# Patient Record
Sex: MALE | Race: WHITE | NOT HISPANIC OR LATINO | Employment: FULL TIME | URBAN - METROPOLITAN AREA
[De-identification: names, ages, dates, MRNs, and addresses within clinical notes are randomized per-mention and may not be internally consistent; named-entity substitution may affect disease eponyms.]

---

## 2019-10-12 ENCOUNTER — OFFICE VISIT (OUTPATIENT)
Dept: URGENT CARE | Facility: CLINIC | Age: 54
End: 2019-10-12
Payer: OTHER MISCELLANEOUS

## 2019-10-12 DIAGNOSIS — M54.32 SCIATICA OF LEFT SIDE: ICD-10-CM

## 2019-10-12 DIAGNOSIS — S39.012A LUMBAR STRAIN, INITIAL ENCOUNTER: Primary | ICD-10-CM

## 2019-10-12 PROCEDURE — 96372 THER/PROPH/DIAG INJ SC/IM: CPT | Mod: S$GLB,,, | Performed by: NURSE PRACTITIONER

## 2019-10-12 PROCEDURE — 99204 OFFICE O/P NEW MOD 45 MIN: CPT | Mod: 25,S$GLB,, | Performed by: NURSE PRACTITIONER

## 2019-10-12 PROCEDURE — 96372 PR INJECTION,THERAP/PROPH/DIAG2ST, IM OR SUBCUT: ICD-10-PCS | Mod: S$GLB,,, | Performed by: NURSE PRACTITIONER

## 2019-10-12 PROCEDURE — 99204 PR OFFICE/OUTPT VISIT, NEW, LEVL IV, 45-59 MIN: ICD-10-PCS | Mod: 25,S$GLB,, | Performed by: NURSE PRACTITIONER

## 2019-10-12 RX ORDER — METHOCARBAMOL 500 MG/1
500 TABLET, FILM COATED ORAL 3 TIMES DAILY
Qty: 30 TABLET | Refills: 0 | Status: SHIPPED | OUTPATIENT
Start: 2019-10-12 | End: 2019-10-22

## 2019-10-12 RX ORDER — DICLOFENAC SODIUM 50 MG/1
50 TABLET, DELAYED RELEASE ORAL 3 TIMES DAILY PRN
Qty: 30 TABLET | Refills: 0 | Status: SHIPPED | OUTPATIENT
Start: 2019-10-12 | End: 2019-10-12 | Stop reason: SDUPTHER

## 2019-10-12 RX ORDER — DEXAMETHASONE SODIUM PHOSPHATE 4 MG/ML
8 INJECTION, SOLUTION INTRA-ARTICULAR; INTRALESIONAL; INTRAMUSCULAR; INTRAVENOUS; SOFT TISSUE
Status: COMPLETED | OUTPATIENT
Start: 2019-10-12 | End: 2019-10-12

## 2019-10-12 RX ORDER — PREDNISONE 20 MG/1
20 TABLET ORAL 2 TIMES DAILY
Qty: 10 TABLET | Refills: 0 | Status: SHIPPED | OUTPATIENT
Start: 2019-10-12 | End: 2019-10-12 | Stop reason: SDUPTHER

## 2019-10-12 RX ORDER — KETOROLAC TROMETHAMINE 30 MG/ML
30 INJECTION, SOLUTION INTRAMUSCULAR; INTRAVENOUS
Status: COMPLETED | OUTPATIENT
Start: 2019-10-12 | End: 2019-10-12

## 2019-10-12 RX ORDER — DICLOFENAC SODIUM 50 MG/1
50 TABLET, DELAYED RELEASE ORAL 3 TIMES DAILY PRN
Qty: 30 TABLET | Refills: 0 | Status: SHIPPED | OUTPATIENT
Start: 2019-10-12 | End: 2020-05-06

## 2019-10-12 RX ORDER — PREDNISONE 20 MG/1
20 TABLET ORAL 2 TIMES DAILY
Qty: 10 TABLET | Refills: 0 | Status: SHIPPED | OUTPATIENT
Start: 2019-10-12 | End: 2019-10-17

## 2019-10-12 RX ORDER — METHOCARBAMOL 500 MG/1
500 TABLET, FILM COATED ORAL 3 TIMES DAILY
Qty: 30 TABLET | Refills: 0 | Status: SHIPPED | OUTPATIENT
Start: 2019-10-12 | End: 2019-10-12 | Stop reason: SDUPTHER

## 2019-10-12 RX ADMIN — DEXAMETHASONE SODIUM PHOSPHATE 8 MG: 4 INJECTION, SOLUTION INTRA-ARTICULAR; INTRALESIONAL; INTRAMUSCULAR; INTRAVENOUS; SOFT TISSUE at 04:10

## 2019-10-12 RX ADMIN — KETOROLAC TROMETHAMINE 30 MG: 30 INJECTION, SOLUTION INTRAMUSCULAR; INTRAVENOUS at 04:10

## 2019-10-12 NOTE — PATIENT INSTRUCTIONS
Back Sprain or Strain    Injury to the muscles (strain) or ligaments (sprain) around the spine can be troubling. Injury may occur after a sudden forceful twisting or bending force such as in a car accident, after a simple awkward movement, or after lifting something heavy with poor body positioning. In any case, muscle spasm is often present and adds to the pain.  Thankfully, most people feel better in 1 to 2 weeks, and most of the rest in 1 to 2 months. Most people can remain active. Unless you had a forceful or traumatic physical injury such as a car accident or fall, X-rays may not be ordered for the first evaluation of a back sprain or strain. If pain continues and does not respond to medical treatment, your healthcare provider may then order X-rays and other tests.  Home care  The following guidelines will help you care for your injury at home:  · When in bed, try to find a comfortable position. A firm mattress is best. Try lying flat on your back with pillows under your knees. You can also try lying on your side with your knees bent up toward your chest and a pillow between your knees.  · Don't sit for long periods. Try not to take long car rides or take other trips that have you sitting for a long time. This puts more stress on the lower back than standing or walking.  · During the first 24 to 72 hours after an injury or flare-up, apply an ice pack to the painful area for 20 minutes. Then remove it for 20 minutes. Do this for 60 to 90 minutes, or several times a day. This will reduce swelling and pain. Be sure to wrap the ice pack in a thin towel or plastic to protect your skin.  · You can start with ice, then switch to heat. Heat from a hot shower, hot bath, or heating pad reduces pain and works well for muscle spasms. Put heat on the painful area for 20 minutes, then remove for 20 minutes. Do this for 60 to 90 minutes, or several times a day. Do not use a heating pad while sleeping. It can burn the  skin.  · You can alternate the ice and heat. Talk with your healthcare provider to find out the best treatment or therapy for your back pain.  · Therapeutic massage will help relax the back muscles without stretching them.  · Be aware of safe lifting methods. Do not lift anything over 15 pounds until all of the pain is gone.  Medicines  Talk to your healthcare provider before using medicines, especially if you have other health problems or are taking other medicines.  · You may use acetaminophen or ibuprofen to control pain, unless another pain medicine was prescribed. If you have chronic conditions like diabetes, liver or kidney disease, stomach ulcers, or gastrointestinal bleeding, or are taking blood-thinner medicines, talk with your doctor before taking any medicines.  · Be careful if you are given prescription medicines, narcotics, or medicine for muscle spasm. They can cause drowsiness, and affect your coordination, reflexes, and judgment. Do not drive or operate heavy machinery when taking these types of medicines. Only take pain medicine as prescribed by your healthcare provider.  Follow-up care  Follow up with your healthcare provider, or as advised. You may need physical therapy or more tests if your symptoms get worse.  If you had X-rays your healthcare provider may be checking for any broken bones, breaks, or fractures. Bruises and sprains can sometimes hurt as much as a fracture. These injuries can take time to heal completely. If your symptoms dont improve or they get worse, talk with your healthcare provider. You may need a repeat X-ray or other tests.  Call 911  Call for emergency care if any of the following occur:  · Trouble breathing  · Confused  · Very drowsy or trouble awakening  · Fainting or loss of consciousness  · Rapid or very slow heart rate  · Loss of bowel or bladder control  When to seek medical advice  Call your healthcare provider right away if any of the following occur:  · Pain  gets worse or spreads to your arms or legs  · Weakness or numbness in one or both arms or legs  · Numbness in the groin or genital area  Date Last Reviewed: 6/1/2016 © 2000-2017 [employer]. [address] [place] [address], [place] [address]. All rights reserved. This information is not intended as a substitute for professional medical care. Always follow your healthcare professional's instructions.        Sciatica    Sciatica is a condition that causes pain in the lower back that spreads down into the buttock, hip, and leg. Sometimes the leg pain can happen without any back pain. Sciatica happens when a spinal nerve is irritated or has pressure put on it as comes out of the spinal canal in the lower back. This most often happens when a bulge or rupture of a nearby spinal disk presses on the nerve. Sciatica can also be caused by a narrowing of the spinal canal (spinal stenosis) or spasm of the muscle in the buttocks that the sciatic nerve passes through (pyriform muscle). Sciatica is also called lumbar radiculopathy.  Sciatica may begin after a sudden twisting or bending force, such as in a car accident. Or it can happen after a simple awkward movement. In either case, muscle spasm often also happens. Muscle spasm makes the pain worse.  A healthcare provider makes a diagnosis of sciatica from your symptoms and a physical exam. Unless you had an injury from a car accident or fall, you usually wont have X-rays taken at this time. This is because the nerves and disks in your back cant be seen on an X-ray. If the provider sees signs of a compressed nerve, you will need to schedule an MRI scan as an outpatient. Signs of a compressed nerve include loss of strength in a leg.  Most sciatica gets better with medicine, exercise, and physical therapy. If your symptoms continue after at least 3 months of medical treatment, you may need surgery or injections to your lower back.  Home care  Follow these tips when caring  for yourself at home:  · You may need to stay in bed the first few days. But as soon as possible, begin sitting up or walking. This will help you avoid problems that come from staying in bed for long periods.  · When in bed, try to find a position that is comfortable. A firm mattress is best. Try lying flat on your back with pillows under your knees. You can also try lying on your side with your knees bent up toward your chest and a pillow between your knees.  · Avoid sitting for long periods. This puts more stress on your lower back than standing or walking.  · Use heat from a hot shower, hot bath, or heating pad to help ease pain. Massage can also help. You can also try using an ice pack. You can make your own ice pack by putting ice cubes in a plastic bag. Wrap the bag in a thin towel. Try both heat and cold to see which works best. Use the method that feels best for 20 minutes several times a day.  · You may use acetaminophen or ibuprofen to ease pain, unless another pain medicine was prescribed. Note: If you have chronic liver or kidney disease, talk with your healthcare provider before taking these medicines. Also talk with your provider if youve had a stomach ulcer or gastrointestinal bleeding.  · Use safe lifting methods. Dont lift anything heavier than 15 pounds until all of the pain is gone.  Follow-up care  Follow up with your healthcare provider, or as advised. You may need physical therapy or additional tests.  If X-rays were taken, a radiologist will look at them. You will be told of any new findings that may affect your care.  When to seek medical advice  Call your healthcare provider right away if any of these occur:  · Pain gets worse even after taking prescribed medicine  · Weakness or numbness in 1 or both legs or hips  · Numbness in your groin or genital area  · You cant control your bowel or bladder  · Fever  · Redness or swelling over your back or spine   Date Last Reviewed: 8/1/2016  ©  1318-9412 [employer]. [address] [place] [address], [place] [address]. All rights reserved. This information is not intended as a substitute for professional medical care. Always follow your healthcare professional's instructions.        Understanding Lumbar Radiculopathy    Lumbar radiculopathy is irritation or inflammation of a nerve root in the low back. It causes symptoms that spread out from the back down one or both legs. To understand this condition, it helps to understand the parts of the spine:  · Vertebrae. These are bones that stack to form the spine. The lumbar spine contains the 5 bottom vertebrae.  · Disks. These are soft pads of tissue between the vertebrae. They act as shock absorbers for the spine.  · Spinal canal. This is a tunnel formed within the stacked vertebrae. In the lumbar spine, nerves run through this canal.  · Nerves. These branch off and leave the spinal canal, traveling out to parts of the body. As they leave the spinal canal, nerves pass through openings between the vertebrae. The nerve root is the part of the nerve that is closest to the spinal canal.  · Sciatic nerve. This is a large nerve formed from several nerve roots in the low back. This nerve extends down the back of the leg to the foot.  With lumbar radiculopathy, nerve roots in the low back become irritated. This leads to pain and symptoms. The sciatic nerve is commonly involved, so the condition is often called sciatica.  What causes lumbar radiculopathy?  Aging, injury, poor posture, extra body weight, and other issues can lead to problems in the low back. These problems may then irritate nerve roots. They include:  · Damage to a disk in the lumbar spine. The damaged disk may then press on nearby nerve roots.  · Degeneration from wear and tear, and aging. This can lead to narrowing (stenosis) of the openings between the vertebrae. The narrowed openings press on nerve roots as they leave the spinal  canal.  · Unstable spine. This is when a vertebra slips forward. It can then press on a nerve root.  Other, less common things can put pressure on nerves in the low back. These include diabetes, infection, or a tumor.  Symptoms of lumbar radiculopathy  These include:  · Pain in the low back  · Pain, numbness, tingling, or weakness that travels into the buttocks, hip, groin, or leg  · Muscle spasms  Treatment for lumbar radiculopathy  In most cases, your healthcare provider will first try treatments that help relieve symptoms. These may include:  · Prescription and over-the-counter pain medicines. These help relieve pain, swelling, and irritation.  · Limits on positions and activities that increase pain. But lying in bed or avoiding all movement is only recommended for a short period of time.  · Physical therapy, including exercises and stretches. This helps decrease pain and increase movement and function.  · Steroid shots into the lower back. This may help relieve symptoms for a time.  · Weight-loss program. If you are overweight, losing extra pounds may help relieve symptoms.  In some cases, you may need surgery to fix the underlying problem. This depends on the cause, the symptoms, and how long the pain has lasted.  Possible complications  Over time, an irritated and inflamed nerve may become damaged. This may lead to long-lasting (permanent) numbness or weakness in your legs and feet. If symptoms change suddenly or get worse, be sure to let your healthcare provider know.  When to call your healthcare provider  Call your healthcare provider right away if you have any of these:  · New pain or pain that gets worse  · New or increasing weakness, tingling, or numbness in your leg or foot  · Problems controlling your bladder or bowel   Date Last Reviewed: 3/10/2016  © 7223-5153 [email or web address] [address] [place] [address], [place] [address]. All rights reserved. This information is not intended as a substitute  for professional medical care. Always follow your healthcare professional's instructions.        Lumbar Extension (Flexibility)    1. Lie face down on your stomach, forehead on the floor. You can lie on a mat or towel.  2. Bend your arms next to your body and lift your upper body up on your forearms. Your palms and forearms should be flat on the floor. Keep your stomach and hips on the floor.  3. Hold your upper body up with your forearms for 20 seconds. Slowly lower back down to the floor.  4. Repeat 2 times, or as instructed.  Date Last Reviewed: 3/10/2016  © 1249-0854 [employer]. [address] [place] [address]. All rights reserved. This information is not intended as a substitute for professional medical care. Always follow your healthcare professional's instructions.        Lumbar Flexion (Flexibility)    5. Lie on your back on the floor, with your knees bent and your feet flat on the floor.  6. Gently pull your knees up toward your chest. Put your hands under your thighs to help pull your knees up.  7. Press your lower back down to the floor. Hold for 20 seconds.  8. Lower your legs back down to the floor and relax.  9. Repeat 2 times, or as instructed.  Date Last Reviewed: 3/10/2016  © 6188-9136 [employer]. [address] [place] [address]. All rights reserved. This information is not intended as a substitute for professional medical care. Always follow your healthcare professional's instructions.        Lumbar Rotation    10. Lie on your back on the floor, with your knees bent and your feet flat on the floor. Dont press your neck or lower back to the floor.  11. Lean both of your knees to one side. Turn your head in the opposite direction. Keep your shoulders flat on the floor. Be gentle and dont push through pain.  12. Hold for 20 seconds. Then slowly move your knees and head in the other direction.  13. Repeat 2 to 5 times, or as instructed.   Date Last  Reviewed: 3/10/2016  © 8221-6908 [employer]. [address] [place] [address]. All rights reserved. This information is not intended as a substitute for professional medical care. Always follow your healthcare professional's instructions.        Lumbar Stretch (Flexibility)    14. Lie on your back on the floor, with your knees bent and your feet flat on the floor. Dont press your neck or lower back to the floor.  15. Pull one knee up toward your chest. Clasp your hands under your thigh to help pull.  16. Hold for 30 to 60 seconds. Lower your leg back down to the floor.  17. Repeat 2 times, or as instructed.  18. Switch legs and repeat.  Date Last Reviewed: 3/10/2016  © 3348-1580 [employer]. [address] [place] [id number]. All rights reserved. This information is not intended as a substitute for professional medical care. Always follow your healthcare professional's instructions.

## 2019-10-12 NOTE — PROGRESS NOTES
Subjective:       Patient ID: Tato Weaver is a 54 y.o. male.    Vitals:  vitals were not taken for this visit.     Chief Complaint: Work Related Injury    W/C DOI: 10/9/2019 pt was moving pallet and hurt lower back. reports that pain is to lower back and radiates down the left leg     Back Pain   This is a new problem. The current episode started in the past 7 days (10/09/2019). The problem occurs constantly. The pain is present in the lumbar spine. The quality of the pain is described as burning and stabbing. The pain radiates to the left foot. The pain is at a severity of 10/10. The pain is moderate. The pain is the same all the time. The symptoms are aggravated by lying down. Pertinent negatives include no chest pain, dysuria, fever or headaches. (Lower back pain that radiates down the left leg that causes numbness and tingling ) He has tried NSAIDs (tylenol and ibuprofen. tylenol last dosage was at 3 pm ) for the symptoms. The treatment provided mild relief.       Constitution: Negative for chills, fatigue and fever.   HENT: Negative for congestion and sore throat.    Neck: Negative for painful lymph nodes.   Cardiovascular: Negative for chest pain and leg swelling.   Eyes: Negative for double vision and blurred vision.   Respiratory: Negative for cough and shortness of breath.    Gastrointestinal: Negative for nausea, vomiting and diarrhea.   Genitourinary: Negative for dysuria, frequency and urgency.   Musculoskeletal: Positive for pain, trauma (moving a heavy pallet), back pain and muscle ache. Negative for joint pain, joint swelling and muscle cramps.   Skin: Negative for color change, pale and rash.   Allergic/Immunologic: Negative for seasonal allergies.   Neurological: Negative for dizziness, history of vertigo, light-headedness, passing out and headaches.   Hematologic/Lymphatic: Negative for swollen lymph nodes, easy bruising/bleeding and history of blood clots. Does not bruise/bleed easily.    Psychiatric/Behavioral: Negative for nervous/anxious, sleep disturbance and depression. The patient is not nervous/anxious.        Objective:      Physical Exam   Constitutional: He is oriented to person, place, and time. Vital signs are normal. He appears well-developed and well-nourished. He is active and cooperative. No distress.   HENT:   Head: Normocephalic and atraumatic.   Right Ear: External ear normal.   Left Ear: External ear normal.   Nose: Nose normal.   Mouth/Throat: Oropharynx is clear and moist and mucous membranes are normal.   Eyes: Conjunctivae and lids are normal.   Neck: Trachea normal, normal range of motion, full passive range of motion without pain and phonation normal. Neck supple.   Cardiovascular: Normal rate, regular rhythm, normal heart sounds, intact distal pulses and normal pulses.   Pulmonary/Chest: Effort normal and breath sounds normal. No respiratory distress.   Abdominal: Soft. Normal appearance and bowel sounds are normal. He exhibits no distension, no abdominal bruit, no pulsatile midline mass and no mass. There is no tenderness.   Musculoskeletal: He exhibits no edema or deformity.        Lumbar back: He exhibits decreased range of motion, tenderness, pain and spasm. He exhibits no bony tenderness, no swelling, no edema, no deformity, no laceration and normal pulse.        Back:         Left upper leg: He exhibits tenderness. He exhibits no bony tenderness, no swelling, no edema, no deformity and no laceration.        Legs:  Neurological: He is alert and oriented to person, place, and time. He has normal strength and normal reflexes. No sensory deficit.   Skin: Skin is warm, dry, intact, not diaphoretic and not pale. not left upper leg  Psychiatric: He has a normal mood and affect. His speech is normal and behavior is normal. Judgment and thought content normal. Cognition and memory are normal.   Nursing note and vitals reviewed.        Assessment:       1. Lumbar strain,  initial encounter    2. Sciatica of left side        Plan:         Lumbar strain, initial encounter    Sciatica of left side    Other orders  -     dexamethasone injection 8 mg  -     ketorolac injection 30 mg  -     diclofenac (VOLTAREN) 50 MG EC tablet; Take 1 tablet (50 mg total) by mouth 3 (three) times daily as needed.  Dispense: 30 tablet; Refill: 0  -     predniSONE (DELTASONE) 20 MG tablet; Take 1 tablet (20 mg total) by mouth 2 (two) times daily. for 5 days  Dispense: 10 tablet; Refill: 0  -     methocarbamol (ROBAXIN) 500 MG Tab; Take 1 tablet (500 mg total) by mouth 3 (three) times daily. for 10 days  Dispense: 30 tablet; Refill: 0

## 2019-10-15 ENCOUNTER — OFFICE VISIT (OUTPATIENT)
Dept: URGENT CARE | Facility: CLINIC | Age: 54
End: 2019-10-15
Payer: OTHER MISCELLANEOUS

## 2019-10-15 VITALS
DIASTOLIC BLOOD PRESSURE: 91 MMHG | TEMPERATURE: 98 F | RESPIRATION RATE: 20 BRPM | SYSTOLIC BLOOD PRESSURE: 125 MMHG | WEIGHT: 230 LBS | BODY MASS INDEX: 32.2 KG/M2 | HEART RATE: 63 BPM | HEIGHT: 71 IN | OXYGEN SATURATION: 98 %

## 2019-10-15 DIAGNOSIS — M54.9 BACK PAIN, UNSPECIFIED BACK LOCATION, UNSPECIFIED BACK PAIN LATERALITY, UNSPECIFIED CHRONICITY: ICD-10-CM

## 2019-10-15 DIAGNOSIS — M79.605 PAIN OF LEFT LOWER EXTREMITY: ICD-10-CM

## 2019-10-15 DIAGNOSIS — M54.16 LUMBAR RADICULOPATHY: Primary | ICD-10-CM

## 2019-10-15 PROCEDURE — 73590 PR  X-RAY TIB + FIB, 2VW: ICD-10-PCS | Mod: LT,S$GLB,, | Performed by: NURSE PRACTITIONER

## 2019-10-15 PROCEDURE — 99204 PR OFFICE/OUTPT VISIT, NEW, LEVL IV, 45-59 MIN: ICD-10-PCS | Mod: 25,S$GLB,, | Performed by: NURSE PRACTITIONER

## 2019-10-15 PROCEDURE — 72100 X-RAY EXAM L-S SPINE 2/3 VWS: CPT | Mod: S$GLB,,, | Performed by: NURSE PRACTITIONER

## 2019-10-15 PROCEDURE — 99204 OFFICE O/P NEW MOD 45 MIN: CPT | Mod: 25,S$GLB,, | Performed by: NURSE PRACTITIONER

## 2019-10-15 PROCEDURE — 73590 X-RAY EXAM OF LOWER LEG: CPT | Mod: LT,S$GLB,, | Performed by: NURSE PRACTITIONER

## 2019-10-15 PROCEDURE — 72100 PR  X-RAY LUMBAR SPINE 2/3 VW: ICD-10-PCS | Mod: S$GLB,,, | Performed by: NURSE PRACTITIONER

## 2019-10-15 RX ORDER — TRAMADOL HYDROCHLORIDE 50 MG/1
50 TABLET ORAL EVERY 6 HOURS PRN
Qty: 10 TABLET | Refills: 0 | Status: SHIPPED | OUTPATIENT
Start: 2019-10-15 | End: 2020-05-06

## 2019-10-15 RX ORDER — TIZANIDINE 4 MG/1
4 TABLET ORAL EVERY 8 HOURS PRN
Qty: 30 TABLET | Refills: 0 | Status: SHIPPED | OUTPATIENT
Start: 2019-10-15 | End: 2020-05-06

## 2019-10-15 NOTE — PROGRESS NOTES
"Subjective:       Patient ID: Tato Weaver is a 54 y.o. male.    Vitals:  height is 5' 11" (1.803 m) and weight is 104.3 kg (230 lb). His oral temperature is 98.3 °F (36.8 °C). His blood pressure is 125/91 (abnormal) and his pulse is 63. His respiration is 20 and oxygen saturation is 98%.     Chief Complaint: Back Pain (L)    DOI: 10/9/2019. Pt was seen for back strain on 10/12/19 and reports the pain is much worse and he can barely walk anymore. He states "his shin feels like it breaking in half". Denies new injury or trauma. He has continued to have pain in the left lower back but now has pain to the anterior left lower leg below his knee. He reports taking all medications as prescribed.     Back Pain   This is a new problem. The current episode started in the past 7 days. The problem occurs constantly. The problem has been gradually worsening since onset. The pain radiates to the left thigh and left knee. Pertinent negatives include no chest pain, dysuria, fever or headaches. He has tried nothing for the symptoms. The treatment provided no relief.       Constitution: Negative for chills, fatigue and fever.   HENT: Negative for congestion and sore throat.    Neck: Negative for painful lymph nodes.   Cardiovascular: Negative for chest pain and leg swelling.   Eyes: Negative for double vision and blurred vision.   Respiratory: Negative for cough and shortness of breath.    Gastrointestinal: Negative for nausea, vomiting and diarrhea.   Genitourinary: Negative for dysuria, frequency and urgency.   Musculoskeletal: Positive for pain (left leg), back pain and pain with walking. Negative for joint pain, joint swelling, muscle cramps and muscle ache.   Skin: Negative for color change, pale and rash.   Allergic/Immunologic: Negative for seasonal allergies.   Neurological: Negative for dizziness, history of vertigo, light-headedness, passing out and headaches.   Hematologic/Lymphatic: Negative for swollen lymph " nodes, easy bruising/bleeding and history of blood clots. Does not bruise/bleed easily.   Psychiatric/Behavioral: Negative for nervous/anxious, sleep disturbance and depression. The patient is not nervous/anxious.        Objective:      Physical Exam   Constitutional: He is oriented to person, place, and time. He appears well-developed and well-nourished. He is cooperative.  Non-toxic appearance. He does not appear ill. No distress.   HENT:   Head: Normocephalic and atraumatic.   Right Ear: Hearing, tympanic membrane, external ear and ear canal normal.   Left Ear: Hearing, tympanic membrane, external ear and ear canal normal.   Nose: Nose normal. No mucosal edema, rhinorrhea or nasal deformity. No epistaxis. Right sinus exhibits no maxillary sinus tenderness and no frontal sinus tenderness. Left sinus exhibits no maxillary sinus tenderness and no frontal sinus tenderness.   Mouth/Throat: Uvula is midline, oropharynx is clear and moist and mucous membranes are normal. No trismus in the jaw. Normal dentition. No uvula swelling. No posterior oropharyngeal erythema.   Eyes: Conjunctivae and lids are normal. Right eye exhibits no discharge. Left eye exhibits no discharge. No scleral icterus.   Neck: Trachea normal, normal range of motion, full passive range of motion without pain and phonation normal. Neck supple.   Cardiovascular: Normal rate, regular rhythm, normal heart sounds, intact distal pulses and normal pulses.   Pulmonary/Chest: Effort normal and breath sounds normal. No respiratory distress.   Abdominal: Soft. Normal appearance and bowel sounds are normal. He exhibits no distension, no pulsatile midline mass and no mass. There is no tenderness.   Musculoskeletal: Normal range of motion. He exhibits no edema or deformity.        Lumbar back: He exhibits tenderness.        Back:         Left lower leg: He exhibits bony tenderness. He exhibits no swelling and no deformity.        Legs:  Neurological: He is alert  and oriented to person, place, and time. He exhibits normal muscle tone. Coordination normal.   Skin: Skin is warm, dry, intact, not diaphoretic and not pale.   Psychiatric: He has a normal mood and affect. His speech is normal and behavior is normal. Judgment and thought content normal. Cognition and memory are normal.   Nursing note and vitals reviewed.        Assessment:       1. Lumbar radiculopathy    2. Pain of left lower extremity    3. Back pain, unspecified back location, unspecified back pain laterality, unspecified chronicity        Plan:       No evidence of tibial plateau fracture on xray that was independently interpreted by me. Lumbar spine also shows no acute fracture/subluxation. Will change robaxin to zanaflex and give a very small prescription for tramadol. Patient will return in 4 days for recheck. Do not suspect cauda equina.   Lumbar radiculopathy    Pain of left lower extremity  -     X-Ray Tibia Fibula 2 View Left; Future; Expected date: 10/15/2019    Back pain, unspecified back location, unspecified back pain laterality, unspecified chronicity  -     X-Ray Lumbar Spine AP And Lateral; Future; Expected date: 10/15/2019    Other orders  -     tiZANidine (ZANAFLEX) 4 MG tablet; Take 1 tablet (4 mg total) by mouth every 8 (eight) hours as needed.  Dispense: 30 tablet; Refill: 0  -     traMADol (ULTRAM) 50 mg tablet; Take 1 tablet (50 mg total) by mouth every 6 (six) hours as needed for Pain.  Dispense: 10 tablet; Refill: 0

## 2019-10-15 NOTE — PATIENT INSTRUCTIONS
STOP TAKING THE METHOCARBAMOL and USE ZANAFLEX INSTEAD      Sciatica    Sciatica is a condition that causes pain in the lower back that spreads down into the buttock, hip, and leg. Sometimes the leg pain can happen without any back pain. Sciatica happens when a spinal nerve is irritated or has pressure put on it as comes out of the spinal canal in the lower back. This most often happens when a bulge or rupture of a nearby spinal disk presses on the nerve. Sciatica can also be caused by a narrowing of the spinal canal (spinal stenosis) or spasm of the muscle in the buttocks that the sciatic nerve passes through (pyriform muscle). Sciatica is also called lumbar radiculopathy.  Sciatica may begin after a sudden twisting or bending force, such as in a car accident. Or it can happen after a simple awkward movement. In either case, muscle spasm often also happens. Muscle spasm makes the pain worse.  A healthcare provider makes a diagnosis of sciatica from your symptoms and a physical exam. Unless you had an injury from a car accident or fall, you usually wont have X-rays taken at this time. This is because the nerves and disks in your back cant be seen on an X-ray. If the provider sees signs of a compressed nerve, you will need to schedule an MRI scan as an outpatient. Signs of a compressed nerve include loss of strength in a leg.  Most sciatica gets better with medicine, exercise, and physical therapy. If your symptoms continue after at least 3 months of medical treatment, you may need surgery or injections to your lower back.  Home care  Follow these tips when caring for yourself at home:  · You may need to stay in bed the first few days. But as soon as possible, begin sitting up or walking. This will help you avoid problems that come from staying in bed for long periods.  · When in bed, try to find a position that is comfortable. A firm mattress is best. Try lying flat on your back with pillows under your knees. You  can also try lying on your side with your knees bent up toward your chest and a pillow between your knees.  · Avoid sitting for long periods. This puts more stress on your lower back than standing or walking.  · Use heat from a hot shower, hot bath, or heating pad to help ease pain. Massage can also help. You can also try using an ice pack. You can make your own ice pack by putting ice cubes in a plastic bag. Wrap the bag in a thin towel. Try both heat and cold to see which works best. Use the method that feels best for 20 minutes several times a day.  · You may use acetaminophen or ibuprofen to ease pain, unless another pain medicine was prescribed. Note: If you have chronic liver or kidney disease, talk with your healthcare provider before taking these medicines. Also talk with your provider if youve had a stomach ulcer or gastrointestinal bleeding.  · Use safe lifting methods. Dont lift anything heavier than 15 pounds until all of the pain is gone.  Follow-up care  Follow up with your healthcare provider, or as advised. You may need physical therapy or additional tests.  If X-rays were taken, a radiologist will look at them. You will be told of any new findings that may affect your care.  When to seek medical advice  Call your healthcare provider right away if any of these occur:  · Pain gets worse even after taking prescribed medicine  · Weakness or numbness in 1 or both legs or hips  · Numbness in your groin or genital area  · You cant control your bowel or bladder  · Fever  · Redness or swelling over your back or spine   Date Last Reviewed: 8/1/2016  © 1878-9336 [employer]. [address] [place] [address], [place] [address]. All rights reserved. This information is not intended as a substitute for professional medical care. Always follow your healthcare professional's instructions.

## 2019-10-17 ENCOUNTER — OFFICE VISIT (OUTPATIENT)
Dept: URGENT CARE | Facility: CLINIC | Age: 54
End: 2019-10-17
Payer: OTHER MISCELLANEOUS

## 2019-10-17 VITALS
DIASTOLIC BLOOD PRESSURE: 87 MMHG | SYSTOLIC BLOOD PRESSURE: 140 MMHG | TEMPERATURE: 97 F | HEART RATE: 59 BPM | BODY MASS INDEX: 32.2 KG/M2 | OXYGEN SATURATION: 98 % | HEIGHT: 71 IN | WEIGHT: 230 LBS

## 2019-10-17 DIAGNOSIS — M54.9 BACK PAIN, UNSPECIFIED BACK LOCATION, UNSPECIFIED BACK PAIN LATERALITY, UNSPECIFIED CHRONICITY: ICD-10-CM

## 2019-10-17 DIAGNOSIS — M54.16 LUMBAR RADICULOPATHY: Primary | ICD-10-CM

## 2019-10-17 DIAGNOSIS — M54.32 SCIATICA OF LEFT SIDE: ICD-10-CM

## 2019-10-17 PROCEDURE — 99214 OFFICE O/P EST MOD 30 MIN: CPT | Mod: S$GLB,,, | Performed by: NURSE PRACTITIONER

## 2019-10-17 PROCEDURE — 99214 PR OFFICE/OUTPT VISIT, EST, LEVL IV, 30-39 MIN: ICD-10-PCS | Mod: S$GLB,,, | Performed by: NURSE PRACTITIONER

## 2019-10-17 NOTE — PROGRESS NOTES
"Subjective:       Patient ID: Tato Weaver is a 54 y.o. male.    Vitals:  height is 5' 11" (1.803 m) and weight is 104.3 kg (230 lb). His oral temperature is 97.2 °F (36.2 °C). His blood pressure is 140/87 (abnormal) and his pulse is 59 (abnormal). His oxygen saturation is 98%.     Chief Complaint: Work Related Injury    W/C: DOI: 10- F/U:  Pt states "Still feel terrible and needs to be referred to a specialist; shooting pain down the left leg; can not stand up straight, can not stretch the leg, and hard to walk."      Constitution: Negative for chills, fatigue and fever.   HENT: Negative for congestion and sore throat.    Neck: Negative for painful lymph nodes.   Cardiovascular: Negative for chest pain and leg swelling.   Eyes: Negative for double vision and blurred vision.   Respiratory: Negative for cough and shortness of breath.    Gastrointestinal: Negative for nausea, vomiting and diarrhea.   Genitourinary: Negative for dysuria, frequency and urgency.   Musculoskeletal: Positive for pain, back pain, pain with walking (unable to stand up straight due to pain), muscle cramps and muscle ache. Negative for joint pain and joint swelling.   Skin: Negative for color change, pale and rash.   Allergic/Immunologic: Negative for seasonal allergies.   Neurological: Negative for dizziness, history of vertigo, light-headedness, passing out and headaches.   Hematologic/Lymphatic: Negative for swollen lymph nodes, easy bruising/bleeding and history of blood clots. Does not bruise/bleed easily.   Psychiatric/Behavioral: Negative for nervous/anxious, sleep disturbance and depression. The patient is not nervous/anxious.        Objective:      Physical Exam   Constitutional: He is oriented to person, place, and time. He appears well-developed and well-nourished. He is cooperative.  Non-toxic appearance. He does not appear ill. He appears distressed (obviously uncomfortable sitting in chair during exam).   HENT: "   Head: Normocephalic and atraumatic.   Right Ear: Hearing, tympanic membrane, external ear and ear canal normal.   Left Ear: Hearing, tympanic membrane, external ear and ear canal normal.   Nose: Nose normal. No mucosal edema, rhinorrhea or nasal deformity. No epistaxis. Right sinus exhibits no maxillary sinus tenderness and no frontal sinus tenderness. Left sinus exhibits no maxillary sinus tenderness and no frontal sinus tenderness.   Mouth/Throat: Uvula is midline, oropharynx is clear and moist and mucous membranes are normal. No trismus in the jaw. Normal dentition. No uvula swelling. No posterior oropharyngeal erythema.   Eyes: Conjunctivae and lids are normal. Right eye exhibits no discharge. Left eye exhibits no discharge. No scleral icterus.   Neck: Trachea normal, normal range of motion, full passive range of motion without pain and phonation normal. Neck supple.   Cardiovascular: Normal rate, regular rhythm, normal heart sounds, intact distal pulses and normal pulses.   Pulmonary/Chest: Effort normal and breath sounds normal. No respiratory distress.   Abdominal: Soft. Normal appearance and bowel sounds are normal. He exhibits no distension, no pulsatile midline mass and no mass. There is no tenderness.   Musculoskeletal: He exhibits no edema or deformity.        Lumbar back: He exhibits decreased range of motion, tenderness, pain and spasm. He exhibits no bony tenderness, no swelling, no edema, no deformity, no laceration and normal pulse.        Back:    Neurological: He is alert and oriented to person, place, and time. He exhibits normal muscle tone. Coordination normal.   Skin: Skin is warm, dry, intact, not diaphoretic and not pale.   Psychiatric: He has a normal mood and affect. His speech is normal and behavior is normal. Judgment and thought content normal. Cognition and memory are normal.   Nursing note and vitals reviewed.        Assessment:       1. Lumbar radiculopathy    2. Sciatica of left  side    3. Back pain, unspecified back location, unspecified back pain laterality, unspecified chronicity        Plan:     will order outpatient MRI of lumbar spine without contrast as well as refer to Back and spine clinic or Dr. Oscar (patient has established history with this provider), whoever can see patient first.     Lumbar radiculopathy    Sciatica of left side    Back pain, unspecified back location, unspecified back pain laterality, unspecified chronicity

## 2019-10-28 ENCOUNTER — OFFICE VISIT (OUTPATIENT)
Dept: ORTHOPEDICS | Facility: CLINIC | Age: 54
End: 2019-10-28
Payer: OTHER MISCELLANEOUS

## 2019-10-28 VITALS
SYSTOLIC BLOOD PRESSURE: 132 MMHG | HEART RATE: 80 BPM | WEIGHT: 230 LBS | HEIGHT: 71 IN | BODY MASS INDEX: 32.2 KG/M2 | DIASTOLIC BLOOD PRESSURE: 78 MMHG

## 2019-10-28 DIAGNOSIS — M54.16 LUMBAR RADICULITIS: Primary | ICD-10-CM

## 2019-10-28 PROCEDURE — 99203 PR OFFICE/OUTPT VISIT, NEW, LEVL III, 30-44 MIN: ICD-10-PCS | Mod: 25,S$GLB,, | Performed by: ORTHOPAEDIC SURGERY

## 2019-10-28 PROCEDURE — 99203 OFFICE O/P NEW LOW 30 MIN: CPT | Mod: 25,S$GLB,, | Performed by: ORTHOPAEDIC SURGERY

## 2019-10-28 RX ORDER — HYDROCODONE BITARTRATE AND ACETAMINOPHEN 10; 325 MG/1; MG/1
1 TABLET ORAL EVERY 6 HOURS PRN
Qty: 28 TABLET | Refills: 0 | Status: SHIPPED | OUTPATIENT
Start: 2019-10-28 | End: 2019-11-04

## 2019-10-28 RX ORDER — TIZANIDINE 4 MG/1
4 TABLET ORAL EVERY 6 HOURS
Qty: 28 TABLET | Refills: 0 | Status: SHIPPED | OUTPATIENT
Start: 2019-10-28 | End: 2019-11-04

## 2019-10-28 NOTE — LETTER
October 28, 2019      [facility]  [address]   [place] [address]  Phone: [phone]  Fax: [phone]       Patient: Tato Weaver   YOB: 1965  Date of Visit: 10/28/2019    To Whom It May Concern:    Robert Weaver  was at our office on 10/28/2019. At this time, we are going to continue his work restrictions as light duty. We will re-assess him at his next visit. If you have any questions or concerns, or if I can be of further assistance, please do not hesitate to contact me.    Sincerely,    Suhas Oscar MD

## 2019-10-28 NOTE — PROGRESS NOTES
[facility]    Subjective:     Chief Complaint:   Chief Complaint   Patient presents with    Left Lower Leg - Pain     WC Lumbar/LT leg, was pulling batteries into truck. Went to [facility] & was told he had a back sprain. Was given rx of antiinflamm/ muscle relaxer, no relief. Radiating pain down left leg to shin w/ numbness/tingling.       Past Medical History:   Diagnosis Date    Thyroid disease        Past Surgical History:   Procedure Laterality Date    HERNIA REPAIR      kidney stones      KNEE ARTHROSCOPY W/ LASER         Current Outpatient Medications   Medication Sig    SYNTHROID 150 mcg tablet 175 mcg.     valsartan (DIOVAN) 160 MG tablet     diclofenac (VOLTAREN) 50 MG EC tablet Take 1 tablet (50 mg total) by mouth 3 (three) times daily as needed.    hydrocodone-ibuprofen 7.5-200 mg (VICOPROFEN) 7.5-200 mg per tablet     tiZANidine (ZANAFLEX) 4 MG tablet Take 1 tablet (4 mg total) by mouth every 8 (eight) hours as needed.    traMADol (ULTRAM) 50 mg tablet Take 1 tablet (50 mg total) by mouth every 6 (six) hours as needed for Pain.     No current facility-administered medications for this visit.        Review of patient's allergies indicates:   Allergen Reactions    Lisinopril        Family History   Problem Relation Age of Onset    Cancer Father        Social History     Socioeconomic History    Marital status:      Spouse name: Not on file    Number of children: Not on file    Years of education: Not on file    Highest education level: Not on file   Occupational History    Not on file   Social Needs    Financial resource strain: Not on file    Food insecurity:     Worry: Not on file     Inability: Not on file    Transportation needs:     Medical: Not on file     Non-medical: Not on file   Tobacco Use    Smoking status: Former Smoker   Substance and Sexual Activity    Alcohol use: Yes    Drug use: Not on file    Sexual activity: Not on file   Lifestyle    Physical  activity:     Days per week: Not on file     Minutes per session: Not on file    Stress: Not on file   Relationships    Social connections:     Talks on phone: Not on file     Gets together: Not on file     Attends Jainism service: Not on file     Active member of club or organization: Not on file     Attends meetings of clubs or organizations: Not on file     Relationship status: Not on file   Other Topics Concern    Not on file   Social History Narrative    Not on file       History of present illness:  This man has back and left leg pain starting about 10 days ago.  He was at work as a  for years.  He was moving a great big battery felt something pull in the left lumbar area and had within the 1st day onset of major left buttocks pain radiating into the left leg. [facility] started on some steroids not fit for duty bed rest which slowly got a little bit better and over the next week he improved enough where he could go to work light duty.  He was in very bad shape the 1st week we could not get comfortable overall he now is back pain and left leg pain when he sits for a long time still walks with some antalgic gait cannot bend over lift anything at all.  So he is somewhat better but far from normal.  Involves the left leg.  In the past he has had 2 epidural injections for bulging disc but those have been years ago.  Nothing recent.  He has been able to work  for 25 years.  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      Review of Systems:    Constitution: Negative for chills, fever, and sweats.  Negative for unexplained weight loss.    HENT:  Negative for headaches and blurry vision.    Cardiovascular:Negative for chest pain or irregular heart beat. Negative for hypertension.    Respiratory:  Negative for cough and shortness of breath.    Gastrointestinal: Negative for abdominal pain, heartburn, melena, nausea, and  vomitting.    Genitourinary:  Negative bladder incontinence and dysuria.    Musculoskeletal:  See HPI for details.     Neurological: Negative for numbness.    Psychiatric/Behavioral: Negative for depression.  The patient is not nervous/anxious.      Endocrine: Negative for polyuria    Hematologic/Lymphatic: Negative for bleeding problem.  Does not bruise/bleed easily.    Skin: Negative for poor would healing and rash    Objective:      Physical Examination:    Vital Signs:    Vitals:    10/28/19 0913   BP: 132/78   Pulse: 80       Body mass index is 32.08 kg/m².    This a well-developed, well nourished patient in no acute distress.  They are alert and oriented and cooperative to examination.        Saw on physical exam he has got an antalgic gait left side.  Cannot stand up straight.  His tenderness left lumbar.  Has a reverse straight leg raise moderate on the left he has a moderate straight leg raise on the left not on the right.  Calf circumference down 1/2 inch left no significant EHL weakness or quad weakness on the left side.  Pain radiates down the left leg below the knee.  Pertinent New Results:    XRAY Report / Interpretation:   AP lateral lumbar shows a disc spaces are well preserved in the lumbar spine.  There are no acute findings.  Has a normal lumbar lordosis.  No significant spurring in the lumbar spine.  Signature AP pelvis shows no significant hip disorders.  No acute findings.  Signature  Assessment/Plan:      So my impression is certainly looks like a lumbar radiculopathy secondary to disc.  Lumbar disc probably L4-5 left.  He has been functioning well for several years until this incident.  Would recommend a MRI lumbar he has now had bedrest and steroids and still pretty miserable.  He can only work light duty.  We renewed Zanaflex.  He has been unable to sleep so we will add Norco.  We and stretching exercises heat.  MRI will tell us how big the disc is and where he would need an  epidural.      This note was created using Dragon voice recognition software that occasionally misinterpreted phrases or words.

## 2019-11-08 ENCOUNTER — HOSPITAL ENCOUNTER (OUTPATIENT)
Dept: RADIOLOGY | Facility: HOSPITAL | Age: 54
Discharge: HOME | End: 2019-11-08
Attending: ORTHOPAEDIC SURGERY
Payer: OTHER MISCELLANEOUS

## 2019-11-08 DIAGNOSIS — M54.16 LUMBAR RADICULITIS: ICD-10-CM

## 2019-11-08 PROCEDURE — 72148 MRI LUMBAR SPINE W/O DYE: CPT | Mod: TC,PO

## 2019-11-11 ENCOUNTER — TELEPHONE (OUTPATIENT)
Dept: ORTHOPEDICS | Facility: CLINIC | Age: 54
End: 2019-11-11

## 2019-11-11 ENCOUNTER — OFFICE VISIT (OUTPATIENT)
Dept: ORTHOPEDICS | Facility: CLINIC | Age: 54
End: 2019-11-11
Payer: OTHER MISCELLANEOUS

## 2019-11-11 VITALS
DIASTOLIC BLOOD PRESSURE: 83 MMHG | SYSTOLIC BLOOD PRESSURE: 125 MMHG | WEIGHT: 230 LBS | HEART RATE: 75 BPM | HEIGHT: 71 IN | BODY MASS INDEX: 32.2 KG/M2

## 2019-11-11 DIAGNOSIS — M51.26 RUPTURED LUMBAR DISC: Primary | ICD-10-CM

## 2019-11-11 PROCEDURE — 99213 PR OFFICE/OUTPT VISIT, EST, LEVL III, 20-29 MIN: ICD-10-PCS | Mod: S$GLB,,, | Performed by: ORTHOPAEDIC SURGERY

## 2019-11-11 PROCEDURE — 99213 OFFICE O/P EST LOW 20 MIN: CPT | Mod: S$GLB,,, | Performed by: ORTHOPAEDIC SURGERY

## 2019-11-11 RX ORDER — TIZANIDINE 4 MG/1
4 TABLET ORAL 3 TIMES DAILY PRN
COMMUNITY
Start: 2019-11-11 | End: 2019-12-01

## 2019-11-11 RX ORDER — MELOXICAM 7.5 MG/1
7.5 TABLET ORAL DAILY
COMMUNITY
Start: 2019-11-11 | End: 2019-12-11

## 2019-11-11 NOTE — PROGRESS NOTES
Former[facility]    Subjective:     Chief Complaint:   Chief Complaint   Patient presents with    Lumbar Spine - Pain     W/C DOI 10- Lumbar pain/MRI results f/u (11.8.19) States that his pain is better, still has some pain that is worse the longer he stands        Past Medical History:   Diagnosis Date    Thyroid disease        Past Surgical History:   Procedure Laterality Date    HERNIA REPAIR      kidney stones      KNEE ARTHROSCOPY W/ LASER         Current Outpatient Medications   Medication Sig    diclofenac (VOLTAREN) 50 MG EC tablet Take 1 tablet (50 mg total) by mouth 3 (three) times daily as needed.    hydrocodone-ibuprofen 7.5-200 mg (VICOPROFEN) 7.5-200 mg per tablet     levothyroxine (SYNTHROID) 200 MCG tablet 200 mcg.     tiZANidine (ZANAFLEX) 4 MG tablet Take 1 tablet (4 mg total) by mouth every 8 (eight) hours as needed.    valsartan (DIOVAN) 160 MG tablet     traMADol (ULTRAM) 50 mg tablet Take 1 tablet (50 mg total) by mouth every 6 (six) hours as needed for Pain. (Patient not taking: Reported on 11/11/2019)     No current facility-administered medications for this visit.        Review of patient's allergies indicates:   Allergen Reactions    Lisinopril        Family History   Problem Relation Age of Onset    Cancer Father        Social History     Socioeconomic History    Marital status:      Spouse name: Not on file    Number of children: Not on file    Years of education: Not on file    Highest education level: Not on file   Occupational History    Not on file   Social Needs    Financial resource strain: Not on file    Food insecurity:     Worry: Not on file     Inability: Not on file    Transportation needs:     Medical: Not on file     Non-medical: Not on file   Tobacco Use    Smoking status: Former Smoker   Substance and Sexual Activity    Alcohol use: Yes    Drug use: Not on file    Sexual activity: Not on file   Lifestyle    Physical activity:      Days per week: Not on file     Minutes per session: Not on file    Stress: Not on file   Relationships    Social connections:     Talks on phone: Not on file     Gets together: Not on file     Attends Hoahaoism service: Not on file     Active member of club or organization: Not on file     Attends meetings of clubs or organizations: Not on file     Relationship status: Not on file   Other Topics Concern    Not on file   Social History Narrative    Not on file       History of present illness:  We have MRI lumbar and shows evidence for a ruptured disc fragment at L4.  Moderate size clearly left-sided does not look like he ruptured disc with this looks like a disc fragment.  There is some early dehydration the L4-5 disc.  This is best seen on the axial views.  This man continues to have back pain and left leg pain.  He has been working light duty.  He feels like the symptoms overall are improved over the last couple of weeks but clearly they have not resolved      Review of Systems:    Constitution: Negative for chills, fever, and sweats.  Negative for unexplained weight loss.    HENT:  Negative for headaches and blurry vision.    Cardiovascular:Negative for chest pain or irregular heart beat. Negative for hypertension.    Respiratory:  Negative for cough and shortness of breath.    Gastrointestinal: Negative for abdominal pain, heartburn, melena, nausea, and vomitting.    Genitourinary:  Negative bladder incontinence and dysuria.    Musculoskeletal:  See HPI for details.     Neurological: Negative for numbness.    Psychiatric/Behavioral: Negative for depression.  The patient is not nervous/anxious.      Endocrine: Negative for polyuria    Hematologic/Lymphatic: Negative for bleeding problem.  Does not bruise/bleed easily.    Skin: Negative for poor would healing and rash    Objective:      Physical Examination:    Vital Signs:    Vitals:    11/11/19 1514   BP: 125/83   Pulse: 75       Body mass index is 32.08  kg/m².    This a well-developed, well nourished patient in no acute distress.  They are alert and oriented and cooperative to examination.        So he still has a mild straight leg raise on the left he does not have weakness of the left quad he does not have gross weakness of the EHL on the left.  He does not have an antalgic gait.  Pertinent New Results:        Assessment/Plan:      So my impression this is a disc rupture with fragment L4-5 left.  Cannot be 100% sure if he came from L4-5 were perhaps L3-4.  But he clearly of causes some of foraminal stenosis with mild facet hypertrophy in addition to this disc fragment.  The patient is improving with time. Since he is improving and does not have gross weakness of the left leg then I think it is reasonable to buy some more time continue light duty and do an epidural left side for that L4 of 5 disc.  I do not know if he will need surgery or not.  I cannot imagine this disc fragment being present for a long time believe this is probably a new disc fragment causing the symptoms. This is a workman's comp related injury.  He is going to continue to work light duty where he can move around some cannot drive long distances cannot lift 20 lb but he is fairly comfortable moving around a little bit while we get an epidural left side. He has pain medicines were renewing his muscle relaxer see him back in 3 weeks.  It is still possible that he would need surgery      This note was created using Dragon voice recognition software that occasionally misinterpreted phrases or words.

## 2019-11-12 ENCOUNTER — TELEPHONE (OUTPATIENT)
Dept: ORTHOPEDICS | Facility: CLINIC | Age: 54
End: 2019-11-12

## 2019-11-12 NOTE — TELEPHONE ENCOUNTER
----- Message from Alycia Burroughs sent at 11/12/2019 12:24 PM CST -----  Contact: patient  Patient is currently treating with Dr. Oscar and came in yesterday, he prescribed a muscle relaxer which was supposed to be sent to [place] [address] [place], call patient back at [phone] to let him know it has been done.

## 2019-11-12 NOTE — TELEPHONE ENCOUNTER
Spoke with pt, he states [facility] told him they did not receive a call about Zanaflex. I did call this in yesterday and again today

## 2019-11-12 NOTE — TELEPHONE ENCOUNTER
----- Message from Abbie Watson sent at 11/12/2019  3:11 PM CST -----  Contact: patient  Dr gardner pt-called the rx that was sent over to the pharmacy had the wrong date of birth so they would not fill it his YOB: 1965 not 1965

## 2019-11-22 DIAGNOSIS — M51.26 RUPTURED LUMBAR DISC: Primary | ICD-10-CM

## 2019-11-22 RX ORDER — HYDROCODONE BITARTRATE AND ACETAMINOPHEN 10; 325 MG/1; MG/1
1 TABLET ORAL EVERY 4 HOURS PRN
Qty: 28 TABLET | Refills: 0 | Status: SHIPPED | OUTPATIENT
Start: 2019-11-22 | End: 2019-11-29

## 2019-11-22 NOTE — TELEPHONE ENCOUNTER
----- Message from Padmini [place] sent at 11/22/2019  9:05 AM CST -----  Contact: Patient [phone]  Requesting refill on his Hydrocodone 10/325. Dr Oscar

## 2019-11-25 ENCOUNTER — TELEPHONE (OUTPATIENT)
Dept: ORTHOPEDICS | Facility: CLINIC | Age: 54
End: 2019-11-25

## 2019-11-25 NOTE — TELEPHONE ENCOUNTER
----- Message from Padmini [place] sent at 11/25/2019  2:53 PM CST -----  Contact: Patient [phone]  Patient ask if you will schedule his surgery. Dr Oscar

## 2019-11-26 NOTE — TELEPHONE ENCOUNTER
Spoke with pt again. I need to speak with Dr. Oscar to see if he wants to do paperwork or just schedule surgery

## 2019-11-26 NOTE — TELEPHONE ENCOUNTER
----- Message from RT Roxana sent at 11/26/2019  1:21 PM CST -----  Contact: patient      ----- Message -----  From: Abbie Watson  Sent: 11/26/2019  10:26 AM CST  To: RT Dr jj Schmidt pt-he is now ready to schedule surgery pts# [phone]

## 2019-11-27 NOTE — TELEPHONE ENCOUNTER
Spoke with pt, as per Dr. Oscar he can try epidural injections first or see Dr. Worthington for surgery. Pt decided to see Dr. Worthington for surgery.

## 2019-12-11 ENCOUNTER — OFFICE VISIT (OUTPATIENT)
Dept: ORTHOPEDICS | Facility: CLINIC | Age: 54
End: 2019-12-11
Payer: OTHER MISCELLANEOUS

## 2019-12-11 VITALS
WEIGHT: 230 LBS | HEART RATE: 70 BPM | BODY MASS INDEX: 32.2 KG/M2 | DIASTOLIC BLOOD PRESSURE: 80 MMHG | SYSTOLIC BLOOD PRESSURE: 133 MMHG | HEIGHT: 71 IN

## 2019-12-11 DIAGNOSIS — M51.26 RUPTURED LUMBAR DISC: Primary | ICD-10-CM

## 2019-12-11 PROCEDURE — 99213 PR OFFICE/OUTPT VISIT, EST, LEVL III, 20-29 MIN: ICD-10-PCS | Mod: S$GLB,,, | Performed by: ORTHOPAEDIC SURGERY

## 2019-12-11 PROCEDURE — 99213 OFFICE O/P EST LOW 20 MIN: CPT | Mod: S$GLB,,, | Performed by: ORTHOPAEDIC SURGERY

## 2019-12-11 NOTE — PROGRESS NOTES
Subjective:       Patient ID: Tato Weaver is a 54 y.o. male.    Chief Complaint: Pain of the Lumbar Spine (W/C DOI 10.10.19. L spine pain States that his pain comes and goes and also has Radiating pain down left leg to shin w/ numbness/tingling at times. Has limited standing. )      History of Present Illness  On-the-job injury in October of this year.  He has complaints of pain in his lower back going down the left leg. He has a history of having a both disc in his lumbar spine about 7 or 8 years ago treated with an injection and had no further problems until this incident.  He claims that his worker's Comp  has gotten him approval to have an epidural steroid injection.  He has not had any physical therapy    Current Medications  Current Outpatient Medications   Medication Sig Dispense Refill    diclofenac (VOLTAREN) 50 MG EC tablet Take 1 tablet (50 mg total) by mouth 3 (three) times daily as needed. 30 tablet 0    hydrocodone-ibuprofen 7.5-200 mg (VICOPROFEN) 7.5-200 mg per tablet       levothyroxine (SYNTHROID) 200 MCG tablet 200 mcg.       meloxicam (MOBIC) 7.5 MG tablet Take 7.5 mg by mouth once daily.      tiZANidine (ZANAFLEX) 4 MG tablet Take 1 tablet (4 mg total) by mouth every 8 (eight) hours as needed. 30 tablet 0    valsartan (DIOVAN) 160 MG tablet       traMADol (ULTRAM) 50 mg tablet Take 1 tablet (50 mg total) by mouth every 6 (six) hours as needed for Pain. (Patient not taking: Reported on 11/11/2019) 10 tablet 0     No current facility-administered medications for this visit.        Allergies  Review of patient's allergies indicates:   Allergen Reactions    Lisinopril        Past Medical History  Past Medical History:   Diagnosis Date    Thyroid disease        Surgical History  Past Surgical History:   Procedure Laterality Date    HERNIA REPAIR      kidney stones      KNEE ARTHROSCOPY W/ LASER         Family History:   Family History   Problem Relation Age of Onset     Cancer Father        Social History:   Social History     Socioeconomic History    Marital status:      Spouse name: Not on file    Number of children: Not on file    Years of education: Not on file    Highest education level: Not on file   Occupational History    Not on file   Social Needs    Financial resource strain: Not on file    Food insecurity:     Worry: Not on file     Inability: Not on file    Transportation needs:     Medical: Not on file     Non-medical: Not on file   Tobacco Use    Smoking status: Former Smoker   Substance and Sexual Activity    Alcohol use: Yes    Drug use: Not on file    Sexual activity: Not on file   Lifestyle    Physical activity:     Days per week: Not on file     Minutes per session: Not on file    Stress: Not on file   Relationships    Social connections:     Talks on phone: Not on file     Gets together: Not on file     Attends [employer] service: Not on file     Active member of club or organization: Not on file     Attends meetings of clubs or organizations: Not on file     Relationship status: Not on file   Other Topics Concern    Not on file   Social History Narrative    Not on file       Hospitalization/Major Diagnostic Procedure:     Review of Systems     General/Constitutional:  Chills denies. Fatigue denies. Fever denies. Weight gain denies. Weight loss denies.    Respiratory:  Shortness of breath denies.    Cardiovascular:  Chest pain denies.    Gastrointestinal:  Constipation denies. Diarrhea denies. Nausea denies. Vomiting denies.     Hematology:  Easy bruising denies. Prolonged bleeding denies.     Genitourinary:  Frequent urination denies. Pain in lower back denies. Painful urination denies.     Musculoskeletal:  See HPI for details    Skin:  Rash denies.    Neurologic:  Dizziness denies. Gait abnormalities denies. Seizures denies. Tingling/Numbess denies.    Psychiatric:  Anxiety denies. Depressed mood denies.     Objective:   Vital Signs:    Vitals:    12/11/19 1357   BP: 133/80   Pulse: 70        Physical Exam      General Examination:     Constitutional: The patient is alert and oriented to lace person and time. Mood is pleasant.     Head/Face: Normal facial features normal eyebrows    Eyes: Normal extraocular motion bilaterally    Lungs: Respirations are equal and unlabored    Gait is coordinated.    Cardiovascular: There are no swelling or varicosities present.    Lymphatic: Negative for adenopathy    Skin: Normal    Neurological: Level of consciousness normal. Oriented to place person and time and situation    Psychiatric: Oriented to time place person and situation    Lumbar exam shows moderate tenderness lumbosacral junction left posterior iliac spine no spasm range of motion limited straight-leg-raising weakly positive left side reflex symmetrical but hypoactive    XRAY Report/ Interpretation :  Lumbar MRI study performed November 8, 2019 was personally reviewed.  At the L4-5 level there is a small annular disc protrusion in the foraminal zone that contacts the left L4 nerve root and may represent an extruded disc fragment      Assessment:       1. Ruptured lumbar disc        Plan:       Tato was seen today for pain.    Diagnoses and all orders for this visit:    Ruptured lumbar disc  Comments:  L4-5 in the foraminal zone compressing left L4 nerve root         No follow-ups on file.    Treatment options were discussed regards to the nature of the spinal condition conservative pain interventional and surgical options were discussed in detail and the probability of success of the separate treatment options was discussed in detail the patient expressed a clear understanding of the treatment options would regards to surgery the procedure risks benefits complications and outcomes were discussed.  No guarantees were given regards to surgical outcome.  He would like to proceed with a left L4 nerve root transforaminal epidural steroid injection  since decompression of the L4 nerve root is in the foraminal zone caused by the protrusion and foraminal zone of the L4-5 disc    This note was created using Dragon voice recognition software that occasionally misinterpreted phrases or words.

## 2019-12-16 DIAGNOSIS — M54.16 LUMBAR RADICULITIS: Primary | ICD-10-CM

## 2019-12-16 RX ORDER — HYDROCODONE BITARTRATE AND ACETAMINOPHEN 7.5; 325 MG/1; MG/1
1 TABLET ORAL EVERY 6 HOURS PRN
Qty: 28 TABLET | Refills: 0 | Status: SHIPPED | OUTPATIENT
Start: 2019-12-16 | End: 2019-12-23

## 2019-12-16 NOTE — TELEPHONE ENCOUNTER
----- Message from Hui Penn sent at 12/16/2019  2:49 PM CST -----  Contact: Patient  Patient needs refill on hydrocodone. Dr. Oscar patient. Please call [phone]

## 2020-01-13 RX ORDER — MELOXICAM 7.5 MG/1
TABLET ORAL
Qty: 30 TABLET | Refills: 1 | Status: SHIPPED | OUTPATIENT
Start: 2020-01-13 | End: 2020-05-06

## 2020-01-23 ENCOUNTER — TELEPHONE (OUTPATIENT)
Dept: ORTHOPEDICS | Facility: CLINIC | Age: 55
End: 2020-01-23

## 2020-01-23 DIAGNOSIS — M51.26 RUPTURED LUMBAR DISC: Primary | ICD-10-CM

## 2020-01-23 DIAGNOSIS — M54.16 LUMBAR RADICULITIS: ICD-10-CM

## 2020-01-27 DIAGNOSIS — M51.26 RUPTURED LUMBAR DISC: Primary | ICD-10-CM

## 2020-01-27 DIAGNOSIS — M54.16 LUMBAR RADICULITIS: ICD-10-CM

## 2020-01-27 RX ORDER — HYDROCODONE BITARTRATE AND ACETAMINOPHEN 7.5; 325 MG/1; MG/1
1 TABLET ORAL EVERY 6 HOURS PRN
Qty: 28 TABLET | Refills: 0 | Status: SHIPPED | OUTPATIENT
Start: 2020-01-27 | End: 2020-02-03

## 2020-01-27 NOTE — TELEPHONE ENCOUNTER
----- Message from Lindsay Aiken LPN sent at 1/27/2020  3:05 PM CST -----  Contact: patient      ----- Message -----  From: Alycia Burroughs  Sent: 1/27/2020   2:26 PM CST  To: Lindsay Aiken LPN    Patient needs a refill Hydrocodone 7.5-325 mg, call patient back at  [phone]

## 2020-02-03 ENCOUNTER — TELEPHONE (OUTPATIENT)
Dept: ORTHOPEDICS | Facility: CLINIC | Age: 55
End: 2020-02-03

## 2020-02-03 DIAGNOSIS — Z98.890 HISTORY OF LUMBAR LAMINECTOMY: Primary | ICD-10-CM

## 2020-02-03 DIAGNOSIS — M54.16 LUMBAR RADICULITIS: ICD-10-CM

## 2020-05-06 ENCOUNTER — OFFICE VISIT (OUTPATIENT)
Dept: ORTHOPEDICS | Facility: CLINIC | Age: 55
End: 2020-05-06
Payer: OTHER MISCELLANEOUS

## 2020-05-06 VITALS — WEIGHT: 246 LBS | DIASTOLIC BLOOD PRESSURE: 74 MMHG | SYSTOLIC BLOOD PRESSURE: 128 MMHG | BODY MASS INDEX: 34.31 KG/M2

## 2020-05-06 DIAGNOSIS — M54.50 CHRONIC LEFT-SIDED LOW BACK PAIN WITHOUT SCIATICA: ICD-10-CM

## 2020-05-06 DIAGNOSIS — M51.26 RUPTURED LUMBAR DISC: Primary | ICD-10-CM

## 2020-05-06 DIAGNOSIS — G89.29 CHRONIC LEFT-SIDED LOW BACK PAIN WITHOUT SCIATICA: ICD-10-CM

## 2020-05-06 PROCEDURE — 99213 OFFICE O/P EST LOW 20 MIN: CPT | Mod: S$GLB,,, | Performed by: ORTHOPAEDIC SURGERY

## 2020-05-06 PROCEDURE — 99213 PR OFFICE/OUTPT VISIT, EST, LEVL III, 20-29 MIN: ICD-10-PCS | Mod: S$GLB,,, | Performed by: ORTHOPAEDIC SURGERY

## 2020-05-06 NOTE — PROGRESS NOTES
Subjective:       Patient ID: Tato Weaver is a 54 y.o. male.    Chief Complaint: Pain of the Lumbar Spine (WC Lumbar is getting better. Pain does not radiate.He is done with P.T)      History of Present Illness  Patient last seen December 2019 for back pain due to disc herniation.  He has been on light duty and is improving and wants to return to work on a trial basis full duty.  No further leg pain just intermittent left-sided back pain    Current Medications  Current Outpatient Medications   Medication Sig Dispense Refill    levothyroxine (SYNTHROID) 200 MCG tablet 200 mcg.       valsartan (DIOVAN) 160 MG tablet        No current facility-administered medications for this visit.        Allergies  Review of patient's allergies indicates:   Allergen Reactions    Lisinopril        Past Medical History  Past Medical History:   Diagnosis Date    Thyroid disease        Surgical History  Past Surgical History:   Procedure Laterality Date    HERNIA REPAIR      kidney stones      KNEE ARTHROSCOPY W/ LASER         Family History:   Family History   Problem Relation Age of Onset    Cancer Father        Social History:   Social History     Socioeconomic History    Marital status:      Spouse name: Not on file    Number of children: Not on file    Years of education: Not on file    Highest education level: Not on file   Occupational History    Not on file   Social Needs    Financial resource strain: Not on file    Food insecurity:     Worry: Not on file     Inability: Not on file    Transportation needs:     Medical: Not on file     Non-medical: Not on file   Tobacco Use    Smoking status: Former Smoker   Substance and Sexual Activity    Alcohol use: Yes    Drug use: Not on file    Sexual activity: Not on file   Lifestyle    Physical activity:     Days per week: Not on file     Minutes per session: Not on file    Stress: Not on file   Relationships    Social connections:     Talks on  phone: Not on file     Gets together: Not on file     Attends Muslim service: Not on file     Active member of club or organization: Not on file     Attends meetings of clubs or organizations: Not on file     Relationship status: Not on file   Other Topics Concern    Not on file   Social History Narrative    Not on file       Hospitalization/Major Diagnostic Procedure:     Review of Systems     General/Constitutional:  Chills denies. Fatigue denies. Fever denies. Weight gain denies. Weight loss denies.    Respiratory:  Shortness of breath denies.    Cardiovascular:  Chest pain denies.    Gastrointestinal:  Constipation denies. Diarrhea denies. Nausea denies. Vomiting denies.     Hematology:  Easy bruising denies. Prolonged bleeding denies.     Genitourinary:  Frequent urination denies. Pain in lower back denies. Painful urination denies.     Musculoskeletal:  See HPI for details    Skin:  Rash denies.    Neurologic:  Dizziness denies. Gait abnormalities denies. Seizures denies. Tingling/Numbess denies.    Psychiatric:  Anxiety denies. Depressed mood denies.     Objective:   Vital Signs:   Vitals:    05/06/20 0800   BP: 128/74        Physical Exam      General Examination:     Constitutional: The patient is alert and oriented to lace person and time. Mood is pleasant.     Head/Face: Normal facial features normal eyebrows    Eyes: Normal extraocular motion bilaterally    Lungs: Respirations are equal and unlabored    Gait is coordinated.    Cardiovascular: There are no swelling or varicosities present.    Lymphatic: Negative for adenopathy    Skin: Normal    Neurological: Level of consciousness normal. Oriented to place person and time and situation    Psychiatric: Oriented to time place person and situation    Bowel tenderness left paraspinous muscles lumbosacral junction straight leg raising causes back pain only  XRAY Report/ Interpretation:  Previous lumbar MRI reviewed      Assessment:       1. Ruptured  lumbar disc    2. Chronic left-sided low back pain without sciatica        Plan:       Tato was seen today for pain.    Diagnoses and all orders for this visit:    Ruptured lumbar disc    Chronic left-sided low back pain without sciatica         Follow up in about 2 months (around 7/6/2020).    Return upper trial basis full duty effective June 1st 2020.  Return 2 months final assessment    This note was created using Dragon voice recognition software that occasionally misinterpreted phrases or words.

## 2020-06-12 ENCOUNTER — OCCUPATIONAL HEALTH (OUTPATIENT)
Dept: URGENT CARE | Facility: CLINIC | Age: 55
End: 2020-06-12

## 2020-06-12 VITALS — HEIGHT: 71 IN | TEMPERATURE: 97 F | BODY MASS INDEX: 34.67 KG/M2 | WEIGHT: 247.63 LBS

## 2020-06-12 PROCEDURE — 99499 UNLISTED E&M SERVICE: CPT | Mod: S$GLB,,, | Performed by: NURSE PRACTITIONER

## 2020-06-12 PROCEDURE — 99499 PR PHYSICAL - DOT/CDL: ICD-10-PCS | Mod: S$GLB,,, | Performed by: NURSE PRACTITIONER

## 2020-07-08 ENCOUNTER — OFFICE VISIT (OUTPATIENT)
Dept: ORTHOPEDICS | Facility: CLINIC | Age: 55
End: 2020-07-08
Payer: OTHER MISCELLANEOUS

## 2020-07-08 VITALS — WEIGHT: 240 LBS | SYSTOLIC BLOOD PRESSURE: 128 MMHG | DIASTOLIC BLOOD PRESSURE: 74 MMHG | BODY MASS INDEX: 33.47 KG/M2

## 2020-07-08 DIAGNOSIS — M51.26 RUPTURED LUMBAR DISC: Primary | ICD-10-CM

## 2020-07-08 PROCEDURE — 99213 OFFICE O/P EST LOW 20 MIN: CPT | Mod: S$GLB,,, | Performed by: ORTHOPAEDIC SURGERY

## 2020-07-08 PROCEDURE — 99213 PR OFFICE/OUTPT VISIT, EST, LEVL III, 20-29 MIN: ICD-10-PCS | Mod: S$GLB,,, | Performed by: ORTHOPAEDIC SURGERY

## 2020-07-08 NOTE — PROGRESS NOTES
Subjective:       Patient ID: Tato Weaver is a 54 y.o. male.    Chief Complaint: Pain of the Lumbar Spine (WC Lumbar is a lot better. No problems. No tingling in leg. He did have injection and P.T and that helped. He is ready to get back to work)      History of Present Illness  Patient is here follow-up for a lumbar disc herniation that he incurred at work.  Has been on light duty for long time and was put back at full duty without restriction on a trial basis about a month ago.  He is doing well with little to no symptoms.    Current Medications  Current Outpatient Medications   Medication Sig Dispense Refill    levothyroxine (SYNTHROID) 200 MCG tablet 200 mcg.       valsartan (DIOVAN) 160 MG tablet        No current facility-administered medications for this visit.        Allergies  Review of patient's allergies indicates:   Allergen Reactions    Lisinopril        Past Medical History  Past Medical History:   Diagnosis Date    Thyroid disease        Surgical History  Past Surgical History:   Procedure Laterality Date    HERNIA REPAIR      kidney stones      KNEE ARTHROSCOPY W/ LASER         Family History:   Family History   Problem Relation Age of Onset    Cancer Father        Social History:   Social History     Socioeconomic History    Marital status:      Spouse name: Not on file    Number of children: Not on file    Years of education: Not on file    Highest education level: Not on file   Occupational History    Not on file   Social Needs    Financial resource strain: Not on file    Food insecurity     Worry: Not on file     Inability: Not on file    Transportation needs     Medical: Not on file     Non-medical: Not on file   Tobacco Use    Smoking status: Former Smoker    Smokeless tobacco: Never Used   Substance and Sexual Activity    Alcohol use: Yes    Drug use: Not on file    Sexual activity: Not on file   Lifestyle    Physical activity     Days per week: Not on  file     Minutes per session: Not on file    Stress: Not on file   Relationships    Social connections     Talks on phone: Not on file     Gets together: Not on file     Attends Buddhism service: Not on file     Active member of club or organization: Not on file     Attends meetings of clubs or organizations: Not on file     Relationship status: Not on file   Other Topics Concern    Not on file   Social History Narrative    Not on file       Hospitalization/Major Diagnostic Procedure:     Review of Systems     General/Constitutional:  Chills denies. Fatigue denies. Fever denies. Weight gain denies. Weight loss denies.    Respiratory:  Shortness of breath denies.    Cardiovascular:  Chest pain denies.    Gastrointestinal:  Constipation denies. Diarrhea denies. Nausea denies. Vomiting denies.     Hematology:  Easy bruising denies. Prolonged bleeding denies.     Genitourinary:  Frequent urination denies. Pain in lower back denies. Painful urination denies.     Musculoskeletal:  See HPI for details    Skin:  Rash denies.    Neurologic:  Dizziness denies. Gait abnormalities denies. Seizures denies. Tingling/Numbess denies.    Psychiatric:  Anxiety denies. Depressed mood denies.     Objective:   Vital Signs:   Vitals:    07/08/20 0752   BP: 128/74        Physical Exam      General Examination:     Constitutional: The patient is alert and oriented to lace person and time. Mood is pleasant.     Head/Face: Normal facial features normal eyebrows    Eyes: Normal extraocular motion bilaterally    Lungs: Respirations are equal and unlabored    Gait is coordinated.    Cardiovascular: There are no swelling or varicosities present.    Lymphatic: Negative for adenopathy    Skin: Normal    Neurological: Level of consciousness normal. Oriented to place person and time and situation    Psychiatric: Oriented to time place person and situation    Lumbar exam:  Nonantalgic gait.  Full active range of motion.  Bilateral lower  "extremities are distal neurovascular intact with a negative straight leg raise maneuver.    XRAY Report/ Interpretation:  No new studies today      Assessment:       1. Ruptured lumbar disc        Plan:       Tato was seen today for pain.    Diagnoses and all orders for this visit:    Ruptured lumbar disc         No follow-ups on file.  Attila Heath, physician's assistant served in the capacity as a "scribe" for this patient encounter  A "face to face" encounter occurred with Dr. Worthington on this date  The treatment plan and medical decision making is outlined below:  May continue to work without restriction.  Maximal medical improvement as of today.  Follow up as needed    This note was created using Dragon voice recognition software that occasionally misinterpreted phrases or words.    "

## 2020-07-08 NOTE — LETTER
July 8, 2020      [facility]  [address]   [place] [address]  Phone: [phone]  Fax: [phone]       Patient: Tato Weaver   YOB: 1965  Date of Visit: 07/08/2020        Robert Weaver  was at our office on 07/08/2020. Mr. Weaver is released to full duty with no restrictions as of today.  He is at maximum medical improvement and is released from our care.    Sincerely,      Attila Heath PA-C

## 2021-06-09 ENCOUNTER — OCCUPATIONAL HEALTH (OUTPATIENT)
Dept: URGENT CARE | Facility: CLINIC | Age: 56
End: 2021-06-09

## 2021-06-09 VITALS
WEIGHT: 249 LBS | TEMPERATURE: 98 F | OXYGEN SATURATION: 98 % | HEART RATE: 60 BPM | HEIGHT: 71 IN | BODY MASS INDEX: 34.86 KG/M2 | RESPIRATION RATE: 16 BRPM | DIASTOLIC BLOOD PRESSURE: 84 MMHG | SYSTOLIC BLOOD PRESSURE: 130 MMHG

## 2021-06-09 DIAGNOSIS — Z02.1 PRE-EMPLOYMENT EXAMINATION: Primary | ICD-10-CM

## 2021-06-09 PROCEDURE — 99499 PHYSICAL, BASIC COMPLEXITY: ICD-10-PCS | Mod: S$GLB,,, | Performed by: FAMILY MEDICINE

## 2021-06-09 PROCEDURE — 99499 UNLISTED E&M SERVICE: CPT | Mod: S$GLB,,, | Performed by: FAMILY MEDICINE

## 2022-05-31 ENCOUNTER — OCCUPATIONAL HEALTH (OUTPATIENT)
Dept: URGENT CARE | Facility: CLINIC | Age: 57
End: 2022-05-31
Payer: COMMERCIAL

## 2022-05-31 DIAGNOSIS — Z00.00 ENCOUNTER FOR PHYSICAL EXAMINATION: Primary | ICD-10-CM

## 2022-05-31 PROCEDURE — 99499 DOT PHYSICAL: ICD-10-PCS | Mod: S$GLB,,, | Performed by: FAMILY MEDICINE

## 2022-05-31 PROCEDURE — 99499 UNLISTED E&M SERVICE: CPT | Mod: S$GLB,,, | Performed by: FAMILY MEDICINE

## 2022-10-10 ENCOUNTER — HOSPITAL ENCOUNTER (EMERGENCY)
Facility: HOSPITAL | Age: 57
Discharge: HOME | End: 2022-10-10
Attending: EMERGENCY MEDICINE
Payer: COMMERCIAL

## 2022-10-10 ENCOUNTER — TELEPHONE (OUTPATIENT)
Dept: UROLOGY | Facility: CLINIC | Age: 57
End: 2022-10-10
Payer: COMMERCIAL

## 2022-10-10 VITALS
HEIGHT: 71 IN | OXYGEN SATURATION: 97 % | HEART RATE: 51 BPM | TEMPERATURE: 98 F | RESPIRATION RATE: 19 BRPM | DIASTOLIC BLOOD PRESSURE: 96 MMHG | SYSTOLIC BLOOD PRESSURE: 155 MMHG | BODY MASS INDEX: 35 KG/M2 | WEIGHT: 250 LBS

## 2022-10-10 DIAGNOSIS — N20.1 URETEROLITHIASIS: Primary | ICD-10-CM

## 2022-10-10 DIAGNOSIS — N20.0 NEPHROLITHIASIS: Primary | ICD-10-CM

## 2022-10-10 LAB
ALBUMIN SERPL BCP-MCNC: 4.2 G/DL (ref 3.5–5.2)
ALP SERPL-CCNC: 52 U/L (ref 55–135)
ALT SERPL W/O P-5'-P-CCNC: 46 U/L (ref 10–44)
ANION GAP SERPL CALC-SCNC: 9 MMOL/L (ref 8–16)
AST SERPL-CCNC: 26 U/L (ref 10–40)
BASOPHILS # BLD AUTO: 0.07 K/UL (ref 0–0.2)
BASOPHILS NFR BLD: 1.4 % (ref 0–1.9)
BILIRUB SERPL-MCNC: 0.7 MG/DL (ref 0.1–1)
BILIRUB UR QL STRIP: NEGATIVE
BUN SERPL-MCNC: 18 MG/DL (ref 6–20)
CALCIUM SERPL-MCNC: 9.2 MG/DL (ref 8.7–10.5)
CHLORIDE SERPL-SCNC: 103 MMOL/L (ref 95–110)
CLARITY UR: CLEAR
CO2 SERPL-SCNC: 29 MMOL/L (ref 23–29)
COLOR UR: YELLOW
CREAT SERPL-MCNC: 1.3 MG/DL (ref 0.5–1.4)
DIFFERENTIAL METHOD: ABNORMAL
EOSINOPHIL # BLD AUTO: 0.1 K/UL (ref 0–0.5)
EOSINOPHIL NFR BLD: 2.2 % (ref 0–8)
ERYTHROCYTE [DISTWIDTH] IN BLOOD BY AUTOMATED COUNT: 12.8 % (ref 11.5–14.5)
EST. GFR  (NO RACE VARIABLE): >60 ML/MIN/1.73 M^2
GLUCOSE SERPL-MCNC: 85 MG/DL (ref 70–110)
GLUCOSE UR QL STRIP: NEGATIVE
HCT VFR BLD AUTO: 43.1 % (ref 40–54)
HGB BLD-MCNC: 14.6 G/DL (ref 14–18)
HGB UR QL STRIP: NEGATIVE
IMM GRANULOCYTES # BLD AUTO: 0.02 K/UL (ref 0–0.04)
IMM GRANULOCYTES NFR BLD AUTO: 0.4 % (ref 0–0.5)
KETONES UR QL STRIP: NEGATIVE
LEUKOCYTE ESTERASE UR QL STRIP: NEGATIVE
LYMPHOCYTES # BLD AUTO: 1.5 K/UL (ref 1–4.8)
LYMPHOCYTES NFR BLD: 30.4 % (ref 18–48)
MCH RBC QN AUTO: 33.2 PG (ref 27–31)
MCHC RBC AUTO-ENTMCNC: 33.9 G/DL (ref 32–36)
MCV RBC AUTO: 98 FL (ref 82–98)
MONOCYTES # BLD AUTO: 0.5 K/UL (ref 0.3–1)
MONOCYTES NFR BLD: 10.3 % (ref 4–15)
NEUTROPHILS # BLD AUTO: 2.8 K/UL (ref 1.8–7.7)
NEUTROPHILS NFR BLD: 55.3 % (ref 38–73)
NITRITE UR QL STRIP: NEGATIVE
NRBC BLD-RTO: 0 /100 WBC
PH UR STRIP: 6 [PH] (ref 5–8)
PLATELET # BLD AUTO: 198 K/UL (ref 150–450)
PMV BLD AUTO: 10.2 FL (ref 9.2–12.9)
POTASSIUM SERPL-SCNC: 3.9 MMOL/L (ref 3.5–5.1)
PROT SERPL-MCNC: 7.2 G/DL (ref 6–8.4)
PROT UR QL STRIP: ABNORMAL
RBC # BLD AUTO: 4.4 M/UL (ref 4.6–6.2)
SODIUM SERPL-SCNC: 141 MMOL/L (ref 136–145)
SP GR UR STRIP: 1.01 (ref 1–1.03)
URN SPEC COLLECT METH UR: ABNORMAL
UROBILINOGEN UR STRIP-ACNC: NEGATIVE EU/DL
WBC # BLD AUTO: 5.07 K/UL (ref 3.9–12.7)

## 2022-10-10 PROCEDURE — 96361 HYDRATE IV INFUSION ADD-ON: CPT

## 2022-10-10 PROCEDURE — 80053 COMPREHEN METABOLIC PANEL: CPT | Performed by: EMERGENCY MEDICINE

## 2022-10-10 PROCEDURE — 96374 THER/PROPH/DIAG INJ IV PUSH: CPT

## 2022-10-10 PROCEDURE — 81003 URINALYSIS AUTO W/O SCOPE: CPT | Performed by: EMERGENCY MEDICINE

## 2022-10-10 PROCEDURE — 25000003 PHARM REV CODE 250: Performed by: EMERGENCY MEDICINE

## 2022-10-10 PROCEDURE — 63600175 PHARM REV CODE 636 W HCPCS: Performed by: EMERGENCY MEDICINE

## 2022-10-10 PROCEDURE — 99285 EMERGENCY DEPT VISIT HI MDM: CPT | Mod: 25

## 2022-10-10 PROCEDURE — 85025 COMPLETE CBC W/AUTO DIFF WBC: CPT | Performed by: EMERGENCY MEDICINE

## 2022-10-10 PROCEDURE — 96375 TX/PRO/DX INJ NEW DRUG ADDON: CPT

## 2022-10-10 RX ORDER — MORPHINE SULFATE 4 MG/ML
4 INJECTION, SOLUTION INTRAMUSCULAR; INTRAVENOUS
Status: COMPLETED | OUTPATIENT
Start: 2022-10-10 | End: 2022-10-10

## 2022-10-10 RX ORDER — HYDROCODONE BITARTRATE AND ACETAMINOPHEN 5; 325 MG/1; MG/1
1 TABLET ORAL EVERY 4 HOURS PRN
Qty: 12 TABLET | Refills: 0 | Status: ON HOLD | OUTPATIENT
Start: 2022-10-10 | End: 2022-10-11 | Stop reason: HOSPADM

## 2022-10-10 RX ORDER — KETOROLAC TROMETHAMINE 30 MG/ML
10 INJECTION, SOLUTION INTRAMUSCULAR; INTRAVENOUS
Status: COMPLETED | OUTPATIENT
Start: 2022-10-10 | End: 2022-10-10

## 2022-10-10 RX ORDER — TAMSULOSIN HYDROCHLORIDE 0.4 MG/1
0.4 CAPSULE ORAL DAILY
Qty: 10 CAPSULE | Refills: 0 | Status: SHIPPED | OUTPATIENT
Start: 2022-10-10 | End: 2022-10-25

## 2022-10-10 RX ORDER — LOSARTAN POTASSIUM 50 MG/1
50 TABLET ORAL DAILY
COMMUNITY
Start: 2022-10-06

## 2022-10-10 RX ORDER — NAPROXEN 500 MG/1
500 TABLET ORAL 2 TIMES DAILY WITH MEALS
Qty: 30 TABLET | Refills: 0 | Status: SHIPPED | OUTPATIENT
Start: 2022-10-10 | End: 2022-12-08

## 2022-10-10 RX ORDER — ONDANSETRON 2 MG/ML
4 INJECTION INTRAMUSCULAR; INTRAVENOUS
Status: COMPLETED | OUTPATIENT
Start: 2022-10-10 | End: 2022-10-10

## 2022-10-10 RX ADMIN — MORPHINE SULFATE 4 MG: 4 INJECTION, SOLUTION INTRAMUSCULAR; INTRAVENOUS at 12:10

## 2022-10-10 RX ADMIN — SODIUM CHLORIDE 1000 ML: 0.9 INJECTION, SOLUTION INTRAVENOUS at 12:10

## 2022-10-10 RX ADMIN — KETOROLAC TROMETHAMINE 10 MG: 30 INJECTION, SOLUTION INTRAMUSCULAR; INTRAVENOUS at 12:10

## 2022-10-10 RX ADMIN — ONDANSETRON 4 MG: 2 INJECTION INTRAMUSCULAR; INTRAVENOUS at 12:10

## 2022-10-10 NOTE — Clinical Note
"Tato Lopez" Owen was seen and treated in our emergency department on 10/10/2022.  He may return to work on 10/11/2022.       If you have any questions or concerns, please don't hesitate to call.      Jeannie JACKSON    "

## 2022-10-10 NOTE — TELEPHONE ENCOUNTER
Called by er. Pt presented with severe L flank pain. H/o stones. No other recent uro notes in the computer.  Ctrss showed multiple b stones and a Left 6mm uvj stone with mild hydro. He had no fever, no chills. Ua showed blood only. Cr normal and wbc normal. After toradol his pain improved and Er called for close f/u.                    Recommendations for ER:   Discharge home with:  flomax 0.4mg nightly x 30 day  Toradol 10mg every 8 hours as needed for pain  Strain all urine until he passes stone  Return to er if he has fever, chills or severe pain  F/u with josiah our urology NP this Friday to discuss continued trial of passage vs stone procedure    The patient has a left 6 mm distal ureteral stone  he is having minimal pain and still has blood in urine and has not passed stone. stone, 30% chance of passing in next 6 weeks.   The patient has decided to continue with a trial of passage.   SCHEDULE A follow up CT urogram should be scheduled for 3 weeks after visit with mayo (eval upper and mid pole- dilated from stone in kidney) and see if stone in ureter pases if patient does not pass stone, even if no pain or no blood in urine  Schedule a f/u xray - He can also have an xray at time of his CT since he has multiple other stones we can monitor.   Strain all urine or check for stone every void. Collect stone if passed and bring for analysis and cancel follow up CT scan.   Take flomax until patient passes stone or until patient has a repeat CT.  Send prescriptions for: flomax (dispense 30) toradol (dispense 10) and norco 5(dispense 10)  Toradol as needed for pain, alternate with norco.    PT TO go to the ER and not eat or drink anything if they developed the following complaints: Fever and UTI symptoms, Severe pain not controlled with medicine  SCHEDULE Follow up WITH  IN 4 WEEKS (WITHIN A FEW DAYS OF REPEAT CT AND XRAY) to either discuss a procedure to remove the stone or proceed with stone  workup if stone passes.   EXPLAIN that if they do not pass a stone despite having  no pain the pateint could still have a stone that could cause obstruction and loss of kidney function. I recommend follow-up to confirm the patient has passed it.     If he decides to proceed with stone extraction I schedule him for next wed or the following wed    If he passes stone At minimum have him f/u with me in 4 weeks with ctu and bmp prior to re-eval the other stones in his kidney

## 2022-10-11 ENCOUNTER — ANESTHESIA EVENT (OUTPATIENT)
Dept: SURGERY | Facility: HOSPITAL | Age: 57
End: 2022-10-11
Payer: COMMERCIAL

## 2022-10-11 ENCOUNTER — PATIENT MESSAGE (OUTPATIENT)
Dept: SURGERY | Facility: HOSPITAL | Age: 57
End: 2022-10-11
Payer: COMMERCIAL

## 2022-10-11 ENCOUNTER — OFFICE VISIT (OUTPATIENT)
Dept: UROLOGY | Facility: CLINIC | Age: 57
End: 2022-10-11
Payer: COMMERCIAL

## 2022-10-11 ENCOUNTER — HOSPITAL ENCOUNTER (OUTPATIENT)
Facility: HOSPITAL | Age: 57
Discharge: HOME | End: 2022-10-11
Attending: UROLOGY | Admitting: UROLOGY
Payer: COMMERCIAL

## 2022-10-11 ENCOUNTER — ANESTHESIA (OUTPATIENT)
Dept: SURGERY | Facility: HOSPITAL | Age: 57
End: 2022-10-11
Payer: COMMERCIAL

## 2022-10-11 ENCOUNTER — HOSPITAL ENCOUNTER (OUTPATIENT)
Dept: PREADMISSION TESTING | Facility: HOSPITAL | Age: 57
Discharge: HOME | End: 2022-10-11
Payer: COMMERCIAL

## 2022-10-11 VITALS — HEART RATE: 60 BPM | SYSTOLIC BLOOD PRESSURE: 149 MMHG | DIASTOLIC BLOOD PRESSURE: 90 MMHG

## 2022-10-11 DIAGNOSIS — N20.0 NEPHROLITHIASIS: ICD-10-CM

## 2022-10-11 DIAGNOSIS — N20.0 NEPHROLITHIASIS: Primary | ICD-10-CM

## 2022-10-11 DIAGNOSIS — Z01.818 PRE-OP EVALUATION: ICD-10-CM

## 2022-10-11 LAB
BACTERIA #/AREA URNS HPF: ABNORMAL /HPF
BILIRUB UR QL STRIP: NEGATIVE
BILIRUB UR QL STRIP: NEGATIVE
BILIRUBIN, UA POC OHS: NEGATIVE
BLOOD, UA POC OHS: ABNORMAL
CLARITY UR: ABNORMAL
CLARITY UR: ABNORMAL
CLARITY, UA POC OHS: CLEAR
COLOR UR: YELLOW
COLOR UR: YELLOW
COLOR, UA POC OHS: YELLOW
GLUCOSE UR QL STRIP: NEGATIVE
GLUCOSE UR QL STRIP: NEGATIVE
GLUCOSE, UA POC OHS: NEGATIVE
HGB UR QL STRIP: ABNORMAL
HGB UR QL STRIP: ABNORMAL
KETONES UR QL STRIP: NEGATIVE
KETONES UR QL STRIP: NEGATIVE
KETONES, UA POC OHS: NEGATIVE
LEUKOCYTE ESTERASE UR QL STRIP: ABNORMAL
LEUKOCYTE ESTERASE UR QL STRIP: ABNORMAL
LEUKOCYTES, UA POC OHS: ABNORMAL
MICROSCOPIC COMMENT: ABNORMAL
NITRITE UR QL STRIP: NEGATIVE
NITRITE UR QL STRIP: NEGATIVE
NITRITE, UA POC OHS: NEGATIVE
PH UR STRIP: 6 [PH] (ref 5–8)
PH UR STRIP: 6 [PH] (ref 5–8)
PH, UA POC OHS: 6
PROT UR QL STRIP: NEGATIVE
PROT UR QL STRIP: NEGATIVE
PROTEIN, UA POC OHS: NEGATIVE
RBC #/AREA URNS HPF: 37 /HPF (ref 0–4)
SP GR UR STRIP: 1.01 (ref 1–1.03)
SP GR UR STRIP: 1.01 (ref 1–1.03)
SPECIFIC GRAVITY, UA POC OHS: 1.01
SQUAMOUS #/AREA URNS HPF: 2 /HPF
URN SPEC COLLECT METH UR: ABNORMAL
URN SPEC COLLECT METH UR: ABNORMAL
UROBILINOGEN UR STRIP-ACNC: NEGATIVE EU/DL
UROBILINOGEN UR STRIP-ACNC: NEGATIVE EU/DL
UROBILINOGEN, UA POC OHS: 0.2
WBC #/AREA URNS HPF: 13 /HPF (ref 0–5)

## 2022-10-11 PROCEDURE — 74420 UROGRAPHY RTRGR +-KUB: CPT | Mod: 26,,, | Performed by: UROLOGY

## 2022-10-11 PROCEDURE — 74420 PR  X-RAY RETROGRADE PYELOGRAM: ICD-10-PCS | Mod: 26,,, | Performed by: UROLOGY

## 2022-10-11 PROCEDURE — 3077F SYST BP >= 140 MM HG: CPT | Mod: CPTII,S$GLB,, | Performed by: UROLOGY

## 2022-10-11 PROCEDURE — D9220A PRA ANESTHESIA: ICD-10-PCS | Mod: ANES,,, | Performed by: ANESTHESIOLOGY

## 2022-10-11 PROCEDURE — 3080F PR MOST RECENT DIASTOLIC BLOOD PRESSURE >= 90 MM HG: ICD-10-PCS | Mod: CPTII,S$GLB,, | Performed by: UROLOGY

## 2022-10-11 PROCEDURE — D9220A PRA ANESTHESIA: ICD-10-PCS | Mod: CRNA,,, | Performed by: NURSE ANESTHETIST, CERTIFIED REGISTERED

## 2022-10-11 PROCEDURE — 99999 PR PBB SHADOW E&M-EST. PATIENT-LVL V: CPT | Mod: PBBFAC,,, | Performed by: UROLOGY

## 2022-10-11 PROCEDURE — C1758 CATHETER, URETERAL: HCPCS | Performed by: UROLOGY

## 2022-10-11 PROCEDURE — 52356 PR CYSTO/URETERO W/LITHOTRIPSY: ICD-10-PCS | Mod: LT,,, | Performed by: UROLOGY

## 2022-10-11 PROCEDURE — 99999 PR PBB SHADOW E&M-EST. PATIENT-LVL V: ICD-10-PCS | Mod: PBBFAC,,, | Performed by: UROLOGY

## 2022-10-11 PROCEDURE — 99205 OFFICE O/P NEW HI 60 MIN: CPT | Mod: 25,S$GLB,, | Performed by: UROLOGY

## 2022-10-11 PROCEDURE — 71000033 HC RECOVERY, INTIAL HOUR: Performed by: UROLOGY

## 2022-10-11 PROCEDURE — 81003 URINALYSIS AUTO W/O SCOPE: CPT | Mod: QW,S$GLB,, | Performed by: UROLOGY

## 2022-10-11 PROCEDURE — D9220A PRA ANESTHESIA: Mod: CRNA,,, | Performed by: NURSE ANESTHETIST, CERTIFIED REGISTERED

## 2022-10-11 PROCEDURE — 3077F PR MOST RECENT SYSTOLIC BLOOD PRESSURE >= 140 MM HG: ICD-10-PCS | Mod: CPTII,S$GLB,, | Performed by: UROLOGY

## 2022-10-11 PROCEDURE — 82365 CALCULUS SPECTROSCOPY: CPT | Performed by: UROLOGY

## 2022-10-11 PROCEDURE — 99900104 DSU ONLY-NO CHARGE-EA ADD'L HR (STAT): Performed by: UROLOGY

## 2022-10-11 PROCEDURE — 81003 POCT URINALYSIS(INSTRUMENT): ICD-10-PCS | Mod: QW,S$GLB,, | Performed by: UROLOGY

## 2022-10-11 PROCEDURE — C2617 STENT, NON-COR, TEM W/O DEL: HCPCS | Performed by: UROLOGY

## 2022-10-11 PROCEDURE — 81000 URINALYSIS NONAUTO W/SCOPE: CPT | Performed by: UROLOGY

## 2022-10-11 PROCEDURE — 37000009 HC ANESTHESIA EA ADD 15 MINS: Performed by: UROLOGY

## 2022-10-11 PROCEDURE — 37000008 HC ANESTHESIA 1ST 15 MINUTES: Performed by: UROLOGY

## 2022-10-11 PROCEDURE — 1159F PR MEDICATION LIST DOCUMENTED IN MEDICAL RECORD: ICD-10-PCS | Mod: CPTII,S$GLB,, | Performed by: UROLOGY

## 2022-10-11 PROCEDURE — 25000003 PHARM REV CODE 250: Performed by: NURSE ANESTHETIST, CERTIFIED REGISTERED

## 2022-10-11 PROCEDURE — C1769 GUIDE WIRE: HCPCS | Performed by: UROLOGY

## 2022-10-11 PROCEDURE — 1160F RVW MEDS BY RX/DR IN RCRD: CPT | Mod: CPTII,S$GLB,, | Performed by: UROLOGY

## 2022-10-11 PROCEDURE — 27201423 OPTIME MED/SURG SUP & DEVICES STERILE SUPPLY: Performed by: UROLOGY

## 2022-10-11 PROCEDURE — 36000707: Performed by: UROLOGY

## 2022-10-11 PROCEDURE — 93010 ELECTROCARDIOGRAM REPORT: CPT | Mod: ,,, | Performed by: SPECIALIST

## 2022-10-11 PROCEDURE — 93010 EKG 12-LEAD: ICD-10-PCS | Mod: ,,, | Performed by: SPECIALIST

## 2022-10-11 PROCEDURE — 99205 PR OFFICE/OUTPT VISIT, NEW, LEVL V, 60-74 MIN: ICD-10-PCS | Mod: 25,S$GLB,, | Performed by: UROLOGY

## 2022-10-11 PROCEDURE — 4010F ACE/ARB THERAPY RXD/TAKEN: CPT | Mod: CPTII,S$GLB,, | Performed by: UROLOGY

## 2022-10-11 PROCEDURE — 99900103 DSU ONLY-NO CHARGE-INITIAL HR (STAT): Performed by: UROLOGY

## 2022-10-11 PROCEDURE — 1160F PR REVIEW ALL MEDS BY PRESCRIBER/CLIN PHARMACIST DOCUMENTED: ICD-10-PCS | Mod: CPTII,S$GLB,, | Performed by: UROLOGY

## 2022-10-11 PROCEDURE — 3080F DIAST BP >= 90 MM HG: CPT | Mod: CPTII,S$GLB,, | Performed by: UROLOGY

## 2022-10-11 PROCEDURE — 52356 CYSTO/URETERO W/LITHOTRIPSY: CPT | Mod: LT,,, | Performed by: UROLOGY

## 2022-10-11 PROCEDURE — 63600175 PHARM REV CODE 636 W HCPCS: Performed by: NURSE ANESTHETIST, CERTIFIED REGISTERED

## 2022-10-11 PROCEDURE — 93005 ELECTROCARDIOGRAM TRACING: CPT

## 2022-10-11 PROCEDURE — 1159F MED LIST DOCD IN RCRD: CPT | Mod: CPTII,S$GLB,, | Performed by: UROLOGY

## 2022-10-11 PROCEDURE — 63600175 PHARM REV CODE 636 W HCPCS: Performed by: UROLOGY

## 2022-10-11 PROCEDURE — 71000015 HC POSTOP RECOV 1ST HR: Performed by: UROLOGY

## 2022-10-11 PROCEDURE — 87086 URINE CULTURE/COLONY COUNT: CPT | Performed by: UROLOGY

## 2022-10-11 PROCEDURE — 36000706: Performed by: UROLOGY

## 2022-10-11 PROCEDURE — D9220A PRA ANESTHESIA: Mod: ANES,,, | Performed by: ANESTHESIOLOGY

## 2022-10-11 PROCEDURE — 27200651 HC AIRWAY, LMA: Performed by: ANESTHESIOLOGY

## 2022-10-11 PROCEDURE — 25000003 PHARM REV CODE 250: Performed by: ANESTHESIOLOGY

## 2022-10-11 PROCEDURE — 25500020 PHARM REV CODE 255: Performed by: UROLOGY

## 2022-10-11 PROCEDURE — 4010F PR ACE/ARB THEARPY RXD/TAKEN: ICD-10-PCS | Mod: CPTII,S$GLB,, | Performed by: UROLOGY

## 2022-10-11 DEVICE — STENT URETERAL UNIV 6FR 26CM
Type: IMPLANTABLE DEVICE | Site: URETER | Status: NON-FUNCTIONAL
Removed: 2022-11-02

## 2022-10-11 RX ORDER — OXYBUTYNIN CHLORIDE 10 MG/1
10 TABLET, EXTENDED RELEASE ORAL DAILY
Qty: 30 TABLET | Refills: 0 | Status: ON HOLD | OUTPATIENT
Start: 2022-10-11 | End: 2022-11-02 | Stop reason: HOSPADM

## 2022-10-11 RX ORDER — OXYCODONE HYDROCHLORIDE 5 MG/1
5 TABLET ORAL ONCE AS NEEDED
Status: DISCONTINUED | OUTPATIENT
Start: 2022-10-11 | End: 2022-10-11 | Stop reason: HOSPADM

## 2022-10-11 RX ORDER — DOXYCYCLINE HYCLATE 100 MG
100 TABLET ORAL 2 TIMES DAILY
Qty: 28 TABLET | Refills: 0 | Status: SHIPPED | OUTPATIENT
Start: 2022-10-11 | End: 2022-10-25

## 2022-10-11 RX ORDER — LIDOCAINE HCL/PF 100 MG/5ML
SYRINGE (ML) INTRAVENOUS
Status: DISCONTINUED | OUTPATIENT
Start: 2022-10-11 | End: 2022-10-11

## 2022-10-11 RX ORDER — KETOROLAC TROMETHAMINE 10 MG/1
10 TABLET, FILM COATED ORAL EVERY 8 HOURS PRN
Qty: 10 TABLET | Refills: 0 | Status: ON HOLD | OUTPATIENT
Start: 2022-10-11 | End: 2022-11-02 | Stop reason: SDUPTHER

## 2022-10-11 RX ORDER — PROPOFOL 10 MG/ML
VIAL (ML) INTRAVENOUS
Status: DISCONTINUED | OUTPATIENT
Start: 2022-10-11 | End: 2022-10-11

## 2022-10-11 RX ORDER — CIPROFLOXACIN 2 MG/ML
400 INJECTION, SOLUTION INTRAVENOUS
Status: COMPLETED | OUTPATIENT
Start: 2022-10-11 | End: 2022-10-11

## 2022-10-11 RX ORDER — DEXAMETHASONE SODIUM PHOSPHATE 4 MG/ML
INJECTION, SOLUTION INTRA-ARTICULAR; INTRALESIONAL; INTRAMUSCULAR; INTRAVENOUS; SOFT TISSUE
Status: DISCONTINUED | OUTPATIENT
Start: 2022-10-11 | End: 2022-10-11

## 2022-10-11 RX ORDER — ACETAMINOPHEN 10 MG/ML
INJECTION, SOLUTION INTRAVENOUS
Status: DISCONTINUED | OUTPATIENT
Start: 2022-10-11 | End: 2022-10-11

## 2022-10-11 RX ORDER — POTASSIUM CITRATE 15 MEQ/1
15 TABLET, EXTENDED RELEASE ORAL 2 TIMES DAILY
Qty: 180 TABLET | Refills: 3 | Status: SHIPPED | OUTPATIENT
Start: 2022-10-11 | End: 2023-11-16 | Stop reason: SDUPTHER

## 2022-10-11 RX ORDER — EPHEDRINE SULFATE 50 MG/ML
INJECTION, SOLUTION INTRAVENOUS
Status: DISCONTINUED | OUTPATIENT
Start: 2022-10-11 | End: 2022-10-11

## 2022-10-11 RX ORDER — FENTANYL CITRATE 50 UG/ML
25 INJECTION, SOLUTION INTRAMUSCULAR; INTRAVENOUS EVERY 5 MIN PRN
Status: DISCONTINUED | OUTPATIENT
Start: 2022-10-11 | End: 2022-10-11 | Stop reason: HOSPADM

## 2022-10-11 RX ORDER — ONDANSETRON 2 MG/ML
INJECTION INTRAMUSCULAR; INTRAVENOUS
Status: DISCONTINUED | OUTPATIENT
Start: 2022-10-11 | End: 2022-10-11

## 2022-10-11 RX ORDER — TRAMADOL HYDROCHLORIDE 50 MG/1
50 TABLET ORAL EVERY 6 HOURS PRN
Qty: 10 TABLET | Refills: 0 | Status: ON HOLD | OUTPATIENT
Start: 2022-10-11 | End: 2022-11-02 | Stop reason: SDUPTHER

## 2022-10-11 RX ORDER — LIDOCAINE HYDROCHLORIDE 10 MG/ML
1 INJECTION, SOLUTION EPIDURAL; INFILTRATION; INTRACAUDAL; PERINEURAL ONCE
Status: DISCONTINUED | OUTPATIENT
Start: 2022-10-11 | End: 2022-10-11 | Stop reason: HOSPADM

## 2022-10-11 RX ORDER — FENTANYL CITRATE 50 UG/ML
INJECTION, SOLUTION INTRAMUSCULAR; INTRAVENOUS
Status: DISCONTINUED | OUTPATIENT
Start: 2022-10-11 | End: 2022-10-11

## 2022-10-11 RX ORDER — ONDANSETRON 2 MG/ML
4 INJECTION INTRAMUSCULAR; INTRAVENOUS ONCE AS NEEDED
Status: DISCONTINUED | OUTPATIENT
Start: 2022-10-11 | End: 2022-10-11 | Stop reason: HOSPADM

## 2022-10-11 RX ORDER — CIPROFLOXACIN 2 MG/ML
400 INJECTION, SOLUTION INTRAVENOUS
Status: CANCELLED | OUTPATIENT
Start: 2022-10-11

## 2022-10-11 RX ORDER — MIDAZOLAM HYDROCHLORIDE 1 MG/ML
INJECTION INTRAMUSCULAR; INTRAVENOUS
Status: DISCONTINUED | OUTPATIENT
Start: 2022-10-11 | End: 2022-10-11

## 2022-10-11 RX ORDER — DOXYCYCLINE HYCLATE 100 MG
100 TABLET ORAL 2 TIMES DAILY
Qty: 14 TABLET | Refills: 0 | Status: SHIPPED | OUTPATIENT
Start: 2022-10-11 | End: 2022-10-11 | Stop reason: SDUPTHER

## 2022-10-11 RX ADMIN — FENTANYL CITRATE 50 MCG: 50 INJECTION, SOLUTION INTRAMUSCULAR; INTRAVENOUS at 06:10

## 2022-10-11 RX ADMIN — SODIUM CHLORIDE, SODIUM ACETATE ANHYDROUS, SODIUM GLUCONATE, POTASSIUM CHLORIDE, AND MAGNESIUM CHLORIDE: 526; 222; 502; 37; 30 INJECTION, SOLUTION INTRAVENOUS at 07:10

## 2022-10-11 RX ADMIN — ONDANSETRON 4 MG: 2 INJECTION INTRAMUSCULAR; INTRAVENOUS at 06:10

## 2022-10-11 RX ADMIN — SODIUM CHLORIDE, SODIUM ACETATE ANHYDROUS, SODIUM GLUCONATE, POTASSIUM CHLORIDE, AND MAGNESIUM CHLORIDE: 526; 222; 502; 37; 30 INJECTION, SOLUTION INTRAVENOUS at 05:10

## 2022-10-11 RX ADMIN — LIDOCAINE HYDROCHLORIDE 100 MG: 20 INJECTION INTRAVENOUS at 06:10

## 2022-10-11 RX ADMIN — EPHEDRINE SULFATE 10 MG: 50 INJECTION, SOLUTION INTRAMUSCULAR; INTRAVENOUS; SUBCUTANEOUS at 07:10

## 2022-10-11 RX ADMIN — ACETAMINOPHEN 1000 MG: 10 INJECTION, SOLUTION INTRAVENOUS at 06:10

## 2022-10-11 RX ADMIN — PROPOFOL 200 MG: 10 INJECTION, EMULSION INTRAVENOUS at 06:10

## 2022-10-11 RX ADMIN — CIPROFLOXACIN 400 MG: 2 INJECTION INTRAVENOUS at 06:10

## 2022-10-11 RX ADMIN — GLYCOPYRROLATE 0.2 MG: 0.2 INJECTION, SOLUTION INTRAMUSCULAR; INTRAVENOUS at 06:10

## 2022-10-11 RX ADMIN — FENTANYL CITRATE 50 MCG: 50 INJECTION, SOLUTION INTRAMUSCULAR; INTRAVENOUS at 07:10

## 2022-10-11 RX ADMIN — DEXAMETHASONE SODIUM PHOSPHATE 4 MG: 4 INJECTION, SOLUTION INTRA-ARTICULAR; INTRALESIONAL; INTRAMUSCULAR; INTRAVENOUS; SOFT TISSUE at 06:10

## 2022-10-11 RX ADMIN — MIDAZOLAM HYDROCHLORIDE 2 MG: 1 INJECTION, SOLUTION INTRAMUSCULAR; INTRAVENOUS at 06:10

## 2022-10-11 RX ADMIN — GLYCOPYRROLATE 0.2 MG: 0.2 INJECTION, SOLUTION INTRAMUSCULAR; INTRAVENOUS at 07:10

## 2022-10-11 NOTE — PATIENT INSTRUCTIONS
LEAVE AT TOP OF DISCHARGE INSTRUCTIONS    VERY IMPORTANT INSTRUCTIONS FROM  REGARDING YOUR PROCEDURE- PLEASE READ    Your urologist is Dr.Jennifer An  Office number: [phone] ([facility])  Address: [address] [place] [address],  (2nd office building on left); [address] (located on 2nd floor).       The following are specific instructions for Tato Weaver is a 57 y.o. male, so please read CAREFULLY:    If you are on blood thinners and are unsure whether to continue, stop or restart them and it is not mentioned above, then call 's office for further directions    position:      Return on Wednesday November 2nd for left ureteroscopy and stone extraction of remaining stone(s)-   At the next procedure the old stent will be removed, the stone treated with laser (if large) and then fragments removed with a basket. A new stent, likely on strings, will need to be placed to allow ureter to heal. If no string placed you will need one more procedure to have the stent removed while awake a few weeks later. [facility] will contact the patient the day before with time.   The patient will not see  until the follow up procedure. However the patient should contact  via [facility] or her office with any question  The patient will need a urine sample 7days prior to the procedure at [facility]. 's nurse will schedule this if needed.   Repeat bmp 1 week later  Remove gupta at home tomorrow at 10am.       or continue the following medications from the pharmacy unless they have some remaining at home:  Tramadol dispense 15 (take every 8 hours as needed for pain- can cause constipation, take with stool softeners or fiber gummies)  Flomax/tamsulosin 0.4mg, dispense 30 (take nightly until 1 week after stent removed to help relax ureter/stent pain - take at night before bedtime, can cause lightheadedness with standing)  doxy  twice a day for 10 more days, dispense 28(if patient has at least 2 days antibiotics remaining after last procedure can take these instead)   keep the remaining and restart 3 days prior to stent removal  AZO available over the counter (in the UTI section) to help with burning with urination and bladder spasms after the procedure. Take one pill/capsule 2 to 3x as a day as needed.   start Potassium citrate 15meq Twice a day to help increase dietary citrate and prevent stone formation on the stents - expect to see this in the stool, the coating contains the medication.    Ditropan 10mg/Oxybutynin 10mg XL, dispense 30 (take once daily as needed for bladder spasms- can cause constipation, take with stool softeners or fiber gummies    Still needs:   stone workup which will involve collecting urine for 24 hours and blood tests to help determine risks for stone formation  To help figure out how to decrease the patient's rate of stone production: The patient should complete a 24 hour urine collection kit 4 weeks after the stent comes out. The collection kit should be mailed by [employer]. Contact the office to have mailed to you if one was not sent within the next few weeks. Everything should be able to be done from home. UPS comes and picks up the urine.  Screening psa     You have a ureteral stent in your left collecting system that was placed on 10/11/2022  -the stent can only remain for a maximum of 3 to 6 months. If the stent remains longer than this you can get recurrent urinary tract infections, the stent can have more stones form along it and urine will not be able to drain and you will lose all function of your kidney requiring a major surgery and a big incision to remove the kidney.  The ureter is the tube that drains the kidney (where urine is made). It connects the kidney to the bladder (where urine is stored).   A ureteral stent is a is a soft plastic tube with holes in tube that bypasses anything that  obstructs (stone, tumor, scar tissue) the urine from flow from the kidney to the bladder. It is placed by going through the urethra and into the bladder. No incisions are made. Its temporarily inserted into a ureter to help drain urine into the bladder. One end goes in the kidney. The other end goes in the bladder. A coil on each end holds the stent in place. The stent cant be seen from outside the body.      You have a stent that was placed for a stone that was obstructing your urine draining from your kidney. Since the stone(s) is/are still there and the ureteral stent was only placed to bypass the stone to drain the urine,  then YOU STILL NEED YOUR STENT AND STONE(S) OUT. Please read the following for details.  you will return in 2-3 weeks to have the stone removed with smaller instruments. This is called ureteroscopy and it will be done while you are asleep (under anesthesia).  currently your scheduled date for ureteroscopy and stone extraction is listed above. However if you were not given a date, then please call our office at [phone] and let them know you still have a stent and a stone and need to know what happens next. Remember the stent cannot stay in indefinitely.  3 days before your procedure, our nurse will schedule a drive through covid screening depending new guidelines  7 to 10 days prior to your return procedure we may have you come in for a nurse visit or lab visit to give a urine sample to confirm no infection.  Our nurse should contact you for this or you should already have an appointment (see above). If you do not, call the office unless  told you you would not need to provide a sample. There are a few occassions were this is not required but call the clinic to confirm if no appointment made.   If you develop increasing flank/kidney pain, bloody urine, burning with urination (more than you have been experiencing) or fever then contact the office to  provide sample and let   know because if you have an infection, your infection will need to be treated and your procedure postponed or you can become septic after the procedure.   You will likely not see  again until the time of your next procedure. However, if you have any questions, please feel free to contact our office and the nurse will be able to leave a message for the doctor to answer any questions you may have.  will place another stent after the stone extraction/ureteroscopy that will need to be removed a few days to weeks later depending on how much inflammation there is during the procedure.  A new stent is placed because of inflammation from the procedure allowing the urine to drain for few days as about heals.  This new stent will hopefully be able to be removed within a few days.     If you do not receive a follow-up date or further instructions on what is to be done next about the stent, it is your responsibility to make sure that you have follow-up to have your stone or stent removed.     A stent CANNOT remain indefinitely in the kidney. It should not remain if possible more than 3 to 6 months maximum (rarely 1 year if it was placed for cancer).     If you do not follow-up to have your stent removed then you can LOSE YOUR KIDNEY FUNCTION ON THAT SIDE, get RECURRENT URINARY TRACT INFECTIONS,and MORE STONES requiring SURGICAL OPEN removal of your kidney.    You can call our office ([facility] at [phone] and let them know a stent was placed and you need further instructions for follow-up). If there are issues with insurance we will work with you to help you arrange follow-up where it can be removed. Again,  A STENT CANNOT remain indefinitely. Y      A ureteral stent is left in place for many reasons:  to keep the ureter open after a procedure as there will be much inflammation and if no stent is left you will experience the same pain as having the stone that caused the  obstruction.   to drain the kidney of infection.  if the stone is up high and closer to the kideny, if the stone is stuck, or you have an infection, the stent will stretch open the small ureter (the tube that drains the kidney) for a few weeks (usually 2 to 4 weeks max) so that we can return, remove the old stent, place instruments into the ureter or kidney to break up and remove the stone. Sometimes you may need another stent after this procedure.     Ureteral Stent Symptoms can include but are not limited to   Stabbing pain in the kidney or bladder with urination during or especially at the end of voiding. Usually we write you for flomax/tamsulosin and toradol/ketorolac to help stretch and relax the ureter while you have the stent in place. We cannot write for toradol/ketorolac if you have poor kidney function. Please call if you have not received these prescriptions.  constant urge to urinate, frequency of urination, severe bladder spasms and severe pain the kidney, especially during urination. If this is very bothersome we can write you for ditropan, an overactive bladder medication to take short term to help with the spasms.   blood in the urine, especially with increased activity. This can last the entire time the stent is in, it can sometimes clear and return or you may never have any at all. I recommend increasing fluid intake to prevent large clots that may be difficult to urinate out.    Explanation for medications that may have been written for. See the instructions above to see what you were sent home with  Toradol/ketorolac 10mg (dispense 10)- this is a stronger form of ibuprofen, you can take up to every 8 hours but stop taking ibuprofen. Too much of this medicine can cause kidney failure or stomach ulcers. This helps with the inflammation the body is experiencing from the stent. Take this with food. If you have kidney disease then do not take this medication, even if it was written for you.  Tramadol-   take 1 every 4 to 6 hours for pain not relieved with ibuprofen or Tylenol. If you are taking this regularly you will need to take miralax or stool softeners daily to prevent constipation.   Flomax/Tamsulosin 0.4mg mg (dispense 30) - take once nightly before bedtime (at least 3 hours apart from other high blood pressure medicines) while the stent is in and for 1 week after stent removed to help relax the tube the stent is in. If you are taking amlodipine/norvasc for blood pressure, take at least four hours apart (preferably norvasc in morning and flomax/tamsulosin after dinner)  Antibiotics - antibiotics were given just before your procedure that will stay in your system for a while.  If you were written for oral antibiotics, take twice daily. Your doctor will specify amount of days to take. If you have another scheduled procedure to have the stone removed more than 2 weeks after the initial procedure, she may ask you to save antibiotics and restart them 3 days before your next procedure.   AZO over the counter -  you can find this at [facility]. Choose one for bladder pain and anti-spasmodic. You can take it up to 3x a day for 3-4 days as long as you have normal kidney function. It may stain your urine and your clothes  Potassium citrate 15meq Twice a day to help increase dietary citrate and prevent stone formation on the stents - expect to see this in the stool, the coating contains the medication.   Ditropan 10mg/Oxybutynin 10mg XL, dispense 30 (take once daily as needed for bladder spasms- can cause constipation, take with stool softeners or fiber gummies)    When to go to ER:   fever >101.5 or inability to urinate (no urination for >4 hours and bladder pain) due to thick clots  If you go to the ER with pain, they may check to make sure the stent is in good position with an x-ray or ultrasound but unlikely to do anything else.   I will let you know when we will plan to have the stent either removed at the ambulatory  surgical center as an outpatient procedure while you are awake, which is uncomfortable briefly, but not painful or you will remove it yourself by pulling the strings.    6 Easy Ways to Prevent Kidney Stones - please read!!!   Taken from: https://www.kidney.org/atoz/content/kidneystones_prevent    Don't Underestimate Your Sweat. Saunas, hot yoga and heavy exercise may be good for your health, but they also may lead to kidney stones. Why? Loss of water through sweating - whether due to these activities or just the heat of summer--leads to less urine production. The more you sweat, the less you urinate, which allows for stone-causing minerals to settle and bond in the kidneys and urinary tract.  Instead: Hydrate with H2O. One of the best measures you can take to avoid kidney stones is to drink plenty of water, leading you to urinate a lot. So, be sure to keep well hydrated, especially when engaging in exercise or activities that cause a lot of sweating.    It's Not Just the Oxalate. Oxa-what? Oxalate is naturally found in many foods, including fruits and vegetables, nuts and seeds, grains, legumes, and even chocolate and tea. Some examples of foods that contain high levels of oxalate include: peanuts, rhubarb, spinach, beets, chocolate and sweet potatoes. Moderating intake of these foods may be beneficial for people who form calcium oxalate stones, the leading type of kidney stones. A common misconception is that cutting the oxalate-rich foods in your diet alone will reduce the likelihood of forming calcium oxalate kidney stones. While in theory this might be true, this approach isn't smart from an overall health perspective. Most kidney stones are formed when oxalate binds to calcium while urine is produced by the kidneys.  Instead: Eat and drink calcium and oxalate-rich foods together during a meal. In doing so, oxalate and calcium are more likely to bind to one another in the stomach and intestines before the  "kidneys begin processing, making it less likely that kidney stones will form.    Calcium is Not the Enemy. But it tends to get a bad rap! Most likely due to its name and composition, many are under the impression that calcium is the main culprit in calcium-oxalate stones. "I still see patients who wonder why they are getting recurring stones despite cutting down on their calcium intake," said Dr. Fraga. "I've even had patients say that their doctors told them to reduce their calcium intake." A diet low in calcium actually increases one's risk of developing kidney stones.  Instead: Don't reduce the calcium. Work to cut back on the sodium in your diet and to pair calcium-rich foods with oxalate-rich foods.  It's Not One and Done. Passing a kidney stone is often described as one of the most painful experiences a person can have, but unfortunately, it's not always a one-time event. Studies have shown that having even one stone greatly increases your chances of having another. "Most people will want to do anything they can to ensure it doesn't happen again," said Dr. Fraga. "Unfortunately, it doesn't seem to be the case that people make the changes they need to after their first stone event." Research conducted by Dr. Fraga shows that those with kidney stones do not always heed the advice of their nephrologists and urinary specialists. About 15% of kidney stone patients didn't take prescribed medications and 41% did not follow the nutritional advice that would keep stones from recurring.  Instead: Take action! Without the right medications and diet adjustments, stones can come back, and recurring kidney stones also could be an indicator of other problems, including kidney disease.    When Life Hands You Kidney Stones don't fret. And as the saying goes, "make lemonade." It's important to consider dietary remedies alongside prescription medications. While it may seem easier to just take a pill to fix a medical " "problem, consider what lifestyle changes will also make a big impact on your health.  Instead: Next time you drive past a lemonade (or limeade) stand, consider your kidneys. Chronic kidney stones are often treated with potassium citrate, but studies have shown that limeade, lemonade and other fruits and juices high in natural citrate offers the same stone-preventing benefits. Beware of the sugar, though, because it can increase kidney stone risk.   Instead, buy sugar-free lemonade, or make your own by mixing lime or lemon juice with water and using a sugar substitute if needed. "We believe that citrate in the urine may prevent the calcium from binding with other constituents that lead to stones," said Dr. Fraga. "Also, some evidence suggests that citrate may prevent crystals that are already present from binding with each other, thus preventing them from getting bigger."    Not All Stones are Created Equal. In addition to calcium oxalate stones, another common type of kidney stones is uric acid stones. Red meat, organ meats, and shellfish have high concentrations of a natural chemical compound known as purines. "High purine intake leads to a higher production of uric acid and produces a larger acid load for the kidneys to excrete," said Dr. Fraga. Higher uric acid excretion leads to lower overall urine pH, which means the urine is more acidic. The high acid concentration of the urine makes it easier for uric acid stones to form.  Instead: To prevent uric acid stones, cut down on high-purine foods such as red meat, organ meats, and shellfish, and follow a healthy diet that contains mostly vegetables and fruits, whole grains, and low fat dairy products. Limit sugar-sweetened foods and drinks, especially those that contain high fructose corn syrup. Limit alcohol because it can increase uric acid levels in the blood and avoid crash diets for the same reason..Eating less animal-based protein and eating more fruits " and vegetables will help decrease urine acidity and this will help reduce the chance for stone formation.

## 2022-10-11 NOTE — PATIENT INSTRUCTIONS
Tato Weaver is a 57 y.o. male with   1. Nephrolithiasis      Left uvj stone, multiple large left renal stones, r renal stones. Urinary hesitancy. Failed trial of passage. Pain not well controlled.     Plan:     Short plan:   To OR today for cystoscopy, left ureteroscopy and stone extraction.  Retrograde pyelogram to evaluate for any calyceal diverticulum.  If not clearly seen may need a CT urogram prior to repeat ureteroscopy.  Patient states that he was told that some of the stones were in spots that were too hard to reach.  At can likely go home today after procedure unless there is any possibly had a kidney stone.  If there is then we can just place a stent and catheter in he off to stay overnight.  Would hold off on treating the stone if he is infected.  Will leave stent and then plan to return on November 2nd for ureteroscopy and stone extraction of left renal stones.  Will need antibiotics prior to 2nd procedure.  Can go to work between procedures if tolerating stent.  Start potassium citrate 15 twice a day to help prevent stones on stents and honestly just to help prevent more stones.  Will need refills on Norco, Toradol     Special considerations:  Needs stone workup - 24 urine collection to be mailed to house and can be done 1 month after all the stents are out.  Pth   Need to monitor stones on right  Needs PSA eventually once all stents are out- family history  Need bmp repeat, no baseline- last cr in 2010    See below for details:   The spontaneous passage rate in 20 weeks was 312 out of 392 stones, 98% in 0-2 mm, 98% in 3 mm, 81% in 4 mm, 65% in 5 mm, 33% in 6 mm and 9% in ?6.5 mm wide stones.  The stone size and location predicted spontaneous ureteric stone passage. The side and the grade of hydronephrosis only predicted stone passage in specific subgroups    The patient was counseled to go to ER before this if they have fever, chills or pain not able to controlled with pain medication.      The patient has been scheduled for: cystoscopy with left stent placement and possible ureteroscopy and stone extraction     If stent/ureteroscopy planned:  If infection/pus is seen proximal/behind the stone after placement of wire, then the pt will only have a ureteral stent and a catheter placed to maximally drain infection. They will be admitted for IV abx and the gupta will remain until patient's white blood cell count is normal/lower and they do not demonstrate any fevers for 24 hours.   then we will return in 2 to 4 weeks to do a stone extraction with or without laser once infection has been cleared. At that procedure the old stent will be removed and the stone/s will be fragmented and removed and a new ureteral stent replaced but likely on strings so the patient can remove themselves at a designated time so as to avoid another procedure to have the stent removed    If no infection is seen, will attempt to reach stone, laser/fragment it if necessary and then basket out the fragments.  A stent will be placed likely on strings to be removed at the designated time by the patient so as not to have to need another procedure to remove the stent.  it was explained to the patient that it is very easy to remove the stent and does not painful to the remove the stent but in fact the pain usually occurs within a few hours after the stent is removed and is managed with pain medicine.    TYPICAL UROLOGIC MEDICATIONS USED IN PATIENTS WITH STONES  Toradol/ketorolac 10mg (dispense 10)- this is a stronger form of ibuprofen, you can take up to every 8 hours but stop taking ibuprofen. Too much of this medicine can cause kidney failure or stomach ulcers. This helps with the inflammation the body is experiencing from the stent. Take this with food. If you have kidney disease then do not take this medication, even if it was written for you.  Norco/Hydrocodone 5mg (dispense 15) or Tramadol-  take 1 every 4 to 6 hours for pain not  relieved with ibuprofen or Tylenol. If you are taking this regularly you will need to take miralax or stool softeners daily to prevent constipation.   Flomax/Tamsulosin 0.4mg mg (dispense 30) - take once nightly before bedtime (at least 3 hours apart from other high blood pressure medicines) either while trying to pass stones or while the stent is in and for 1 week after stent removed to help relax the tube the stent is in. If you are taking amlodipine/norvasc for blood pressure, take at least four hours apart (preferably norvasc in morning and flomax/tamsulosin after dinner)  Antibiotics - antibiotics were given just before your procedure that will stay in your system for a while.  If you were written for oral antibiotics, take twice daily. Your doctor will specify amount of days to take. If you have another scheduled procedure to have the stone removed more than 2 weeks after the initial procedure, she may ask you to save antibiotics and restart them 3 days before your next procedure.   AZO over the counter -  you can find this at [facility]. Choose one for bladder pain and anti-spasmodic. You can take it up to 3x a day for 3-4 days as long as you have normal kidney function. It may stain your urine and your clothes  Potassium citrate 15meq Twice a day to help increase dietary citrate and prevent stone formation on the stents - expect to see this in the stool, the coating contains the medication.   Ditropan 10mg/Oxybutynin 10mg XL, dispense 30 (take once daily as needed for bladder spasms- can cause constipation, take with stool softeners or fiber gummies)      A ureteral stent is left in place for many reasons:  to keep the ureter open after a procedure as there will be much inflammation and if no stent is left you will experience the same pain as having the stone that caused the obstruction.   to drain the kidney of infection.  if the stone is up high and closer to the kideny, if the stone is stuck, or you have an  infection, the stent will stretch open the small ureter (the tube that drains the kidney) for a few weeks (usually 2 to 4 weeks max) so that we can return, remove the old stent, place instruments into the ureter or kidney to break up and remove the stone. Sometimes you may need another stent after this procedure.     Ureteral Stent Symptoms can include but are not limited to   Stabbing pain in the kidney or bladder with urination during or especially at the end of voiding. Usually we write you for flomax/tamsulosin and toradol/ketorolac to help stretch and relax the ureter while you have the stent in place. We cannot write for toradol/ketorolac if you have poor kidney function. Please call if you have not received these prescriptions.  constant urge to urinate, frequency of urination, severe bladder spasms and severe pain the kidney, especially during urination. If this is very bothersome we can write you for ditropan, an overactive bladder medication to take short term to help with the spasms.   blood in the urine, especially with increased activity. This can last the entire time the stent is in, it can sometimes clear and return or you may never have any at all. I recommend increasing fluid intake to prevent large clots that may be difficult to urinate out.

## 2022-10-11 NOTE — OP NOTE
[facility]  Operative Note    Date: 10/11/2022    Pre-Op Diagnosis: left ureteral and renal stone    Post-Op Diagnosis: same    Procedure(s) Performed:   Cystoscopy, left  retrograde pyelogram  left rigid ureteroscopy, left laser lithotripsy and basket stone extraction  left stent placement, 6 x 28 stent without strings  Flouro < 1 hour     Specimen(s): stone/stone fragments from left ureter, urine from kidney sent for UA reflexive    Staff Surgeon: Dayana An MD    Anesthesia: General endotracheal anesthesia    Indications: Tato Weaver is a 57 y.o. male with a left ureteral stone, presenting for definitive stone management.  He currently does not have a JJ ureteral stent in place.      Findings:   The stone was radioopaque. Urine slightly cloudy. Sent for ua aranza and culture.   Retrograde with no obvious calyceal diverticulum.  Moderate bph  Stones in kidney radioopaque    Estimated Blood Loss: min    Drains:   6 x 28 JJ stent without strings, left  16 Macedonian coude catheter    Implants:   Implant Name Type Inv. Item Serial No.  Lot No. LRB No. Used Action   STENT URETERAL UNIV 6FR 26CM - [id number]  STENT URETERAL UNIV 6FR 26CM  [id number] [id number] Left 1 Implanted       Procedure in detail:  After informed consent was obtained, the patient was brought the the cystoscopy suite and placed in the supine position.  SCDs were applied and working.  GETA was administered.  The patient was then placed in the dorsal lithotomy position and prepped and draped in the usual sterile fashion.      A rigid cystoscope in a 22 Fr sheath was introduced into the patient's urethra.  This passed easily.  The entire urethra was visualized which showed  strictures or masses.  Formal cystoscopy was performed which revealed no masses or lesions suspicious for malignancy.  The ureteral orifices were visualized in the normal anatomic position bilaterally.      A sensor tip wire was  advanced into the left UO up to the renal pelvis using flouro to confirm placement.     An 8 Fr rigid ureteroscope was passed into the patient's bladder alongside the wire into the left UO alongside the wire.  A 2nd Glidewire was used to traintrack up the ureter.  A stone was encountered at the level of distal ureter.   A 365 micron laser fiber was passed through the ureteroscope.  The stone was fragmented using the laser.  The laser fiber was removed. A Nitinol tipless basket was introduced through the ureteroscope.  The stone fragments was removed.  The ureterosope was reintroduced and a retrograde pyelogram was performed through the ureteroscope for stent placement. The ureteroscope was then removed keeping the wire in place.   A cystoscope was reinserted and the bladder was irrigated to remove the stone fragments.  The bladder was drained and the cystoscope was removed keeping the wire in place.      A 6x28 JJ ureteral stent without strings was passed over the wire and up into the left renal pelvis using fluoro.  When the coil appeared to be in good position in the kidney and the radio-opaque marker of the pusher was at the inferior pubis, the wire was removed under continuous fluoro.  Good coils were seen in the kidney and the bladder using fluoro.      A 16 fr gupta catheter was left in place.     The patient tolerated the procedure well and was transferred to the recovery room in stable condition.      Disposition:      Return on Wednesday November 2nd for left ureteroscopy and stone extraction of remaining stone(s)-   At the next procedure the old stent will be removed, the stone treated with laser (if large) and then fragments removed with a basket. A new stent, likely on strings, will need to be placed to allow ureter to heal. If no string placed you will need one more procedure to have the stent removed while awake a few weeks later. [facility] will contact the patient the day before with  time.   The patient will not see  until the follow up procedure. However the patient should contact  [facility] or her office with any question  The patient will need a urine sample 7days prior to the procedure at [facility]. 's nurse will schedule this if needed.   Repeat bmp 1 week later  Remove gupta tomorrow at 10am by cutting across the catheter     or continue the following medications from the pharmacy unless they have some remaining at home:  Tramadol dispense 15 (take every 8 hours as needed for pain- can cause constipation, take with stool softeners or fiber gummies)  Flomax/tamsulosin 0.4mg, dispense 30 (take nightly until 1 week after stent removed to help relax ureter/stent pain - take at night before bedtime, can cause lightheadedness with standing)  doxy twice a day for 10 more days, dispense 28(if patient has at least 2 days antibiotics remaining after last procedure can take these instead)   keep the remaining and restart 3 days prior to stent removal  AZO available over the counter (in the UTI section) to help with burning with urination and bladder spasms after the procedure. Take one pill/capsule 2 to 3x as a day as needed.   start Potassium citrate 15meq Twice a day to help increase dietary citrate and prevent stone formation on the stents - expect to see this in the stool, the coating contains the medication.    Ditropan 10mg/Oxybutynin 10mg XL, dispense 30 (take once daily as needed for bladder spasms- can cause constipation, take with stool softeners or fiber gummies    Still needs:   stone workup which will involve collecting urine for 24 hours and blood tests to help determine risks for stone formation  To help figure out how to decrease the patient's rate of stone production: The patient should complete a 24 hour urine collection kit 4 weeks after the stent comes out. The collection kit should be mailed by [employer]. Contact the office to  have mailed to you if one was not sent within the next few weeks. Everything should be able to be done from home. UPS comes and picks up the urine.  Screening psa         Dayana An MD

## 2022-10-11 NOTE — ED PROVIDER NOTES
Encounter Date: 10/10/2022       History     Chief Complaint   Patient presents with    Flank Pain     Left flank pain, passing stones. Hx of stones.      Patient presents complaining of left flank pain that started this morning.  Patient has a history of kidney stones.  At the worst symptoms are moderate.  Nothing really makes it better worse.  Patient denies nausea vomiting.  No fever.    Review of patient's allergies indicates:   Allergen Reactions    Lisinopril      Past Medical History:   Diagnosis Date    Thyroid disease      Past Surgical History:   Procedure Laterality Date    HERNIA REPAIR      kidney stones      KNEE ARTHROSCOPY W/ LASER       Family History   Problem Relation Age of Onset    Cancer Father      Social History     Tobacco Use    Smoking status: Former    Smokeless tobacco: Never   Substance Use Topics    Alcohol use: Yes     Review of Systems   All other systems reviewed and are negative.    Physical Exam     Initial Vitals [10/10/22 1120]   BP Pulse Resp Temp SpO2   (!) 194/109 64 16 97.7 °F (36.5 °C) 99 %      MAP       --         Physical Exam    Nursing note and vitals reviewed.  Constitutional: He appears well-developed and well-nourished. He is not diaphoretic. No distress.   Pleasant, polite.  Patient appears in pain   HENT:   Head: Normocephalic and atraumatic.   Eyes: EOM are normal.   Neck: Neck supple.   Normal range of motion.  Cardiovascular:  Intact distal pulses.           Pulmonary/Chest: Breath sounds normal. No respiratory distress.   Musculoskeletal:         General: Normal range of motion.      Cervical back: Normal range of motion and neck supple.     Neurological: He is alert.   Skin: Skin is warm and dry.   Psychiatric: He has a normal mood and affect. His behavior is normal. Judgment and thought content normal.       ED Course   Procedures  Labs Reviewed   CBC W/ AUTO DIFFERENTIAL - Abnormal; Notable for the following components:       Result Value    RBC 4.40 (*)      MCH 33.2 (*)     All other components within normal limits   COMPREHENSIVE METABOLIC PANEL - Abnormal; Notable for the following components:    Alkaline Phosphatase 52 (*)     ALT 46 (*)     All other components within normal limits   URINALYSIS, REFLEX TO URINE CULTURE - Abnormal; Notable for the following components:    Protein, UA Trace (*)     All other components within normal limits    Narrative:     Specimen Source->Urine          Imaging Results              CT Renal Stone Study ABD Pelvis WO (Final result)  Result time 10/10/22 12:58:16      Final result by Chaitanya Oden MD (10/10/22 12:58:16)                   Narrative:    CMS MANDATED QUALITY DATA - CT RADIATION - 436    All CT scans at this facility utilize dose modulation, iterative reconstruction, and/or weight based dosing when appropriate to reduce radiation dose to as low as reasonably achievable.        Reason: Nephrolithiasis, symptomatic/complicated    TECHNIQUE: CT abdomen and pelvis without IV or oral contrast. Study is tailored for detection of urinary tract calculi and evaluation of solid organs, hollow viscera, and vascular structures is limited.    COMPARISON: CT abdomen pelvis May 30, 2015.    FINDINGS:    There is subsegmental atelectasis of the lung bases. Heart size is normal.    There is diffuse hypoattenuation of the liver consistent with hepatic steatosis. No enhancing liver lesion identified. The pancreas, spleen and adrenal glands are unremarkable. Left splenule noted.    Right kidney is normal size. There are three nonobstructing right renal calculi are observed, largest measuring 3 mm. There is a 6 mm obstructing calculus at the left UVJ with associated mild left hydronephrosis and hydroureter. There are numerous nonobstructing left renal calculi, largest at the midpole measures 9 mm. There is left perinephric fat stranding. The bladder is fluid-filled with no focal wall abnormalities. The prostate gland and seminal  vesicles are unremarkable.    Large and small bowel are normal caliber. The appendix is normal. Stomach is decompressed.    The abdominal aorta is normal caliber with atherosclerosis. There is no intra-abdominal lymphadenopathy. Ventral abdominal wall is unremarkable. There is no acute osseous abnormality.    IMPRESSION:    1.  6 mm obstructing calculus at the left UVJ with associated mild left hydronephrosis and hydroureter.  2.  Numerous nonobstructing calculi are also noted of the left kidney, largest at the midpole measures 9 mm.  3.  Associated nonspecific perinephric fat stranding.  4.  Multiple right nonobstructing renal calculi.  5.  Hepatic steatosis.    Electronically signed by:  Chaitanya Oden DO  10/10/2022 12:58 PM CDT Workstation: [id number]                                     Medications   sodium chloride 0.9% bolus 1,000 mL (0 mLs Intravenous Stopped 10/10/22 1725)   ketorolac injection 9.999 mg (9.999 mg Intravenous Given 10/10/22 1210)   morphine injection 4 mg (4 mg Intravenous Given 10/10/22 1209)   ondansetron injection 4 mg (4 mg Intravenous Given 10/10/22 1209)     Medical Decision Making:   Initial Assessment:   Patient appears in pain  Differential Diagnosis:   Considerations include but are not limited to obstructing stone, pyelonephritis, acute kidney injury, dehydration  Clinical Tests:   Lab Tests: Reviewed and Ordered  Radiological Study: Ordered and Reviewed  Medical Tests: Reviewed and Ordered  ED Management:  In the emergency department patient has a 6 mm stone at the UVJ.  Patient pain is now controlled.  There is no evidence of UTI.  White blood cell count is normal.  Discussed case Dr. An will follow up patient in clinic on the 14th.  Patient was given reasons to return.  Patient given prescription for anti-inflammatories, pain medication, Flomax.  Patient be discharged stable condition.  Detailed return precautions discussed                        Clinical Impression:    Final diagnoses:  [N20.1] Ureterolithiasis (Primary)        ED Disposition Condition    Discharge Stable          ED Prescriptions       Medication Sig Dispense Start Date End Date Auth. Provider    HYDROcodone-acetaminophen (NORCO) 5-325 mg per tablet Take 1 tablet by mouth every 4 (four) hours as needed. 12 tablet 10/10/2022 -- Pablo Guerrero MD    tamsulosin (FLOMAX) 0.4 mg Cap Take 1 capsule (0.4 mg total) by mouth once daily. 10 capsule 10/10/2022 10/10/2023 Pablo Guerrero MD    naproxen (NAPROSYN) 500 MG tablet Take 1 tablet (500 mg total) by mouth 2 (two) times daily with meals. 30 tablet 10/10/2022 -- Pablo Guerrero MD          Follow-up Information       Follow up With Specialties Details Why Contact Info    Dayana An MD Urology Schedule an appointment as soon as possible for a visit   [address] [place] [address] [address]  [place] [address]  [phone]               Pablo Guerrero MD  10/10/22 1944

## 2022-10-11 NOTE — TELEPHONE ENCOUNTER
Spoke with patient's wife patient urination is restricted since 7am today. Per  added to scheduled today to be seen.

## 2022-10-11 NOTE — ANESTHESIA PROCEDURE NOTES
Intubation    Date/Time: 10/11/2022 6:53 PM  Performed by: Ray Mandujano CRNA  Authorized by: Donny De Los Santos MD     Intubation:     Induction:  Intravenous    Attempts:  1    Attempted By:  CRNA    Difficult Airway Encountered?: No      Complications:  None    Airway Device:  Supraglottic airway/LMA    Airway Device Size:  4.0    Style/Cuff Inflation:  Cuffed (inflated to minimal occlusive pressure)    Placement Verified By:  Capnometry    Complicating Factors:  None

## 2022-10-11 NOTE — TELEPHONE ENCOUNTER
----- Message from Tl Barrera sent at 10/11/2022 10:55 AM CDT -----  Type:  Same Day Appointment Request    Caller is requesting a same day appointment.  Caller declined first available appointment listed below.      Name of Caller:  patient's wife Kyler  When is the first available appointment?  Friday  Symptoms:  Kidney Stones  Best Call Back Number:  [phone]  Additional Information:   She is requesting to speak to someone in office also.

## 2022-10-11 NOTE — PLAN OF CARE
Pre-op complete. Dr. De Los Santos notified of pt's NPO status. OK per MD. Incentive spirometer teaching done. Pt clothing and uppers in locker, pt's spouse took pt's phone, wallet, and glasses. Safety maintained.

## 2022-10-11 NOTE — PROGRESS NOTES
[facility] [place]  PCP: Nixon Denny MD  Referred by: Dayana An  DOS: 10/11/2022    CC: stone      Subjective:      Initial consult by me in CLINIC for kidney stone  on 10/11/22    Tato Weaver is a 57 y.o. male     Patient presented to [facility] ER yesterday with urinary hesitancy, acute left flank pain radiating to groin.  He has a history of stones.  Urinalysis showed only.  No fevers or chills at home.  creatinine 1.3, baseline 0.8/0.9 but this was in 2010.   Blood cell count normal.    Ctrss showed multiple b stones and a Left 6mm uvj stone with mild hydro.  Left kidney with about 2 cm stone burden, CT without dye.  So could have some dilated calices needs further evaluation.  He was sent home with Flomax for trial of passage since it was only 1 cm within the bladder.  Plan was for him to follow up for possible procedure next week.  However  overnight continued to have difficulty urinating.     Here today to discuss possible stone treatment.  Taking Norco around the clock.  Pain was 10 out 10 yesterday.  Pain has been controlled with the Norco today.          Previous stone episode(s) and workup:  Started having stones about 25 years ago.  Then did well until about 8 years ago.  Then required multiple shockwave procedures bilaterally by urologist in [place].  Had a stent placed was told he was cleared of stones.  Does not sound like he had a stone workup.  Stone sounded like they were calcium oxalate.   Family history:No  Any other stone risk factors:none  Anticoagulation: No  The patient does not have any other urologic issues.  Last PSA 2010 0.94.  Two paternal uncles with prostate cancer.    Other pertinent labs:  Lab Results   Component Value Date    CREATININE 1.3 10/10/2022     No results found for: LABA1C, HGBA1C  Lab Results   Component Value Date    CALCIUM 9.2 10/10/2022           Urine history: family history of kidney, bladder or prostate  cancer:Yes - prostate, personal or family history of kidney stones: no - ,tobacco use: yes-quit 2008, anticoagulation: No  10/11/22 Tr leuk/small blood  10/10/22 Tr prot  7/31/10 1+leuk, 26 wbc       PSA History: + fam hx of prostate cancer- 2 paternal uncles  PSA, Screen (ng/ml)   Date Value   07/31/2010 0.94       Current REVIEW OF SYSTEMS: Negative for the remaining 12 points of ROS except for as stated above    Past Medical History:   Diagnosis Date    Thyroid disease        Fam Hx: Reviewed- pertinent family hx as above    Allergies:  Lisinopril    Objective:     Vitals:    10/11/22 1159   BP: (!) 149/90   Pulse: 60       General:wdwn  in NAD  Neurologic: CN grossly normal. Normal sensation.   Psychiatric: awake, alert and oriented x 3. Mood and affect Normal. Cooperative.  Eyes: PERRLA, normal conjunctiva  Respiratory: no increased work on breathing. No wheezing.   Cardiovascular: No obvious extremity edema. Warm and well perfused.  GI: no obvious stomach distension  Musculoskeletal: normal  range of motion of bilateral upper extremities. Normal muscle strength and tone.  Skin: no obvious rashes or lesions. No tightening of skin noted.    Pertinent  exam in HPI      LABS REVIEW:  Recent labs:   Recent Labs   Lab 10/10/22  1156   WBC 5.07   Hemoglobin 14.6   Hematocrit 43.1   Platelets 198   ]  Recent Labs   Lab 10/10/22  1156   Sodium 141   Potassium 3.9   Chloride 103   CO2 29   BUN 18   Creatinine 1.3   Glucose 85   Calcium 9.2   Alkaline Phosphatase 52 L   Total Protein 7.2   Albumin 4.2   Total Bilirubin 0.7   AST 26   ALT 46 H   ]      Assessment:     Tato Weaver is a 57 y.o. male with   1. Nephrolithiasis      Left uvj stone, multiple large left renal stones, r renal stones. Urinary hesitancy. Failed trial of passage. Pain not well controlled.     Plan:     Short plan:   To OR today for cystoscopy, left ureteroscopy and stone extraction.  Retrograde pyelogram to evaluate for any calyceal  diverticulum.  If not clearly seen may need a CT urogram prior to repeat ureteroscopy.  Patient states that he was told that some of the stones were in spots that were too hard to reach.  At can likely go home today after procedure unless there is any possibly had a kidney stone.  If there is then we can just place a stent and catheter in he off to stay overnight.  Would hold off on treating the stone if he is infected.  Will leave stent and then plan to return on November 2nd for ureteroscopy and stone extraction of left renal stones.  Will need antibiotics prior to 2nd procedure.  Can go to work between procedures if tolerating stent.  Start potassium citrate 15 twice a day to help prevent stones on stents and honestly just to help prevent more stones.  Will need refills on Norco, Toradol     Special considerations:  Needs stone workup - 24 urine collection to be mailed to house and can be done 1 month after all the stents are out.  Pth   Need to monitor stones on right  Needs PSA eventually once all stents are out- family history  Need bmp repeat, no baseline- last cr in 2010    See below for details:   The spontaneous passage rate in 20 weeks was 312 out of 392 stones, 98% in 0-2 mm, 98% in 3 mm, 81% in 4 mm, 65% in 5 mm, 33% in 6 mm and 9% in ?6.5 mm wide stones.  The stone size and location predicted spontaneous ureteric stone passage. The side and the grade of hydronephrosis only predicted stone passage in specific subgroups    The patient was counseled to go to ER before this if they have fever, chills or pain not able to controlled with pain medication.     The patient has been scheduled for: cystoscopy with left stent placement and possible ureteroscopy and stone extraction     If stent/ureteroscopy planned:  If infection/pus is seen proximal/behind the stone after placement of wire, then the pt will only have a ureteral stent and a catheter placed to maximally drain infection. They will be admitted for IV  abx and the gupta will remain until patient's white blood cell count is normal/lower and they do not demonstrate any fevers for 24 hours.   then we will return in 2 to 4 weeks to do a stone extraction with or without laser once infection has been cleared. At that procedure the old stent will be removed and the stone/s will be fragmented and removed and a new ureteral stent replaced but likely on strings so the patient can remove themselves at a designated time so as to avoid another procedure to have the stent removed    If no infection is seen, will attempt to reach stone, laser/fragment it if necessary and then basket out the fragments.  A stent will be placed likely on strings to be removed at the designated time by the patient so as not to have to need another procedure to remove the stent.  it was explained to the patient that it is very easy to remove the stent and does not painful to the remove the stent but in fact the pain usually occurs within a few hours after the stent is removed and is managed with pain medicine.    TYPICAL UROLOGIC MEDICATIONS USED IN PATIENTS WITH STONES  Toradol/ketorolac 10mg (dispense 10)- this is a stronger form of ibuprofen, you can take up to every 8 hours but stop taking ibuprofen. Too much of this medicine can cause kidney failure or stomach ulcers. This helps with the inflammation the body is experiencing from the stent. Take this with food. If you have kidney disease then do not take this medication, even if it was written for you.  Norco/Hydrocodone 5mg (dispense 15) or Tramadol-  take 1 every 4 to 6 hours for pain not relieved with ibuprofen or Tylenol. If you are taking this regularly you will need to take miralax or stool softeners daily to prevent constipation.   Flomax/Tamsulosin 0.4mg mg (dispense 30) - take once nightly before bedtime (at least 3 hours apart from other high blood pressure medicines) either while trying to pass stones or while the stent is in and for  1 week after stent removed to help relax the tube the stent is in. If you are taking amlodipine/norvasc for blood pressure, take at least four hours apart (preferably norvasc in morning and flomax/tamsulosin after dinner)  Antibiotics - antibiotics were given just before your procedure that will stay in your system for a while.  If you were written for oral antibiotics, take twice daily. Your doctor will specify amount of days to take. If you have another scheduled procedure to have the stone removed more than 2 weeks after the initial procedure, she may ask you to save antibiotics and restart them 3 days before your next procedure.   AZO over the counter -  you can find this at [facility]. Choose one for bladder pain and anti-spasmodic. You can take it up to 3x a day for 3-4 days as long as you have normal kidney function. It may stain your urine and your clothes  Potassium citrate 15meq Twice a day to help increase dietary citrate and prevent stone formation on the stents - expect to see this in the stool, the coating contains the medication.   Ditropan 10mg/Oxybutynin 10mg XL, dispense 30 (take once daily as needed for bladder spasms- can cause constipation, take with stool softeners or fiber gummies)      A ureteral stent is left in place for many reasons:  to keep the ureter open after a procedure as there will be much inflammation and if no stent is left you will experience the same pain as having the stone that caused the obstruction.   to drain the kidney of infection.  if the stone is up high and closer to the kideny, if the stone is stuck, or you have an infection, the stent will stretch open the small ureter (the tube that drains the kidney) for a few weeks (usually 2 to 4 weeks max) so that we can return, remove the old stent, place instruments into the ureter or kidney to break up and remove the stone. Sometimes you may need another stent after this procedure.     Ureteral Stent Symptoms can include but are  not limited to   Stabbing pain in the kidney or bladder with urination during or especially at the end of voiding. Usually we write you for flomax/tamsulosin and toradol/ketorolac to help stretch and relax the ureter while you have the stent in place. We cannot write for toradol/ketorolac if you have poor kidney function. Please call if you have not received these prescriptions.  constant urge to urinate, frequency of urination, severe bladder spasms and severe pain the kidney, especially during urination. If this is very bothersome we can write you for ditropan, an overactive bladder medication to take short term to help with the spasms.   blood in the urine, especially with increased activity. This can last the entire time the stent is in, it can sometimes clear and return or you may never have any at all. I recommend increasing fluid intake to prevent large clots that may be difficult to urinate out.                              Dayana An MD

## 2022-10-11 NOTE — H&P
[facility] [place]  PCP: Nixon Denny MD  Referred by: Dayana An  DOS: 10/11/2022    CC: stone      Subjective:      Initial consult by me in CLINIC for kidney stone  on 10/11/22    Tato Weaver is a 57 y.o. male     Patient presented to [facility] ER yesterday with urinary hesitancy, acute left flank pain radiating to groin.  He has a history of stones.  Urinalysis showed only.  No fevers or chills at home.  creatinine 1.3, baseline 0.8/0.9 but this was in 2010.   Blood cell count normal.    Ctrss showed multiple b stones and a Left 6mm uvj stone with mild hydro.  Left kidney with about 2 cm stone burden, CT without dye.  So could have some dilated calices needs further evaluation.  He was sent home with Flomax for trial of passage since it was only 1 cm within the bladder.  Plan was for him to follow up for possible procedure next week.  However  overnight continued to have difficulty urinating.     Here today to discuss possible stone treatment.  Taking Norco around the clock.  Pain was 10 out 10 yesterday.  Pain has been controlled with the Norco today.          Previous stone episode(s) and workup:  Started having stones about 25 years ago.  Then did well until about 8 years ago.  Then required multiple shockwave procedures bilaterally by urologist in [place].  Had a stent placed was told he was cleared of stones.  Does not sound like he had a stone workup.  Stone sounded like they were calcium oxalate.   Family history:No  Any other stone risk factors:none  Anticoagulation: No  The patient does not have any other urologic issues.  Last PSA 2010 0.94.  Two paternal uncles with prostate cancer.    Other pertinent labs:  Lab Results   Component Value Date    CREATININE 1.3 10/10/2022     No results found for: LABA1C, HGBA1C  Lab Results   Component Value Date    CALCIUM 9.2 10/10/2022           Urine history: family history of kidney, bladder or prostate  cancer:Yes - prostate, personal or family history of kidney stones: no - ,tobacco use: yes-quit 2008, anticoagulation: No  10/11/22 Tr leuk/small blood  10/10/22 Tr prot  7/31/10 1+leuk, 26 wbc       PSA History: + fam hx of prostate cancer- 2 paternal uncles  PSA, Screen (ng/ml)   Date Value   07/31/2010 0.94       Current REVIEW OF SYSTEMS: Negative for the remaining 12 points of ROS except for as stated above    Past Medical History:   Diagnosis Date    Essential (primary) hypertension     Thyroid disease        Fam Hx: Reviewed- pertinent family hx as above    Allergies:  Lisinopril    Objective:     Vitals:    10/11/22 1715   BP: (!) 155/85   Pulse: (!) 52   Resp: 16   Temp: 97.2 °F (36.2 °C)       General:wdwn  in NAD  Neurologic: CN grossly normal. Normal sensation.   Psychiatric: awake, alert and oriented x 3. Mood and affect Normal. Cooperative.  Eyes: PERRLA, normal conjunctiva  Respiratory: no increased work on breathing. No wheezing.   Cardiovascular: No obvious extremity edema. Warm and well perfused.  GI: no obvious stomach distension  Musculoskeletal: normal  range of motion of bilateral upper extremities. Normal muscle strength and tone.  Skin: no obvious rashes or lesions. No tightening of skin noted.    Pertinent  exam in HPI      LABS REVIEW:  Recent labs:   Recent Labs   Lab 10/10/22  1156   WBC 5.07   Hemoglobin 14.6   Hematocrit 43.1   Platelets 198   ]  Recent Labs   Lab 10/10/22  1156   Sodium 141   Potassium 3.9   Chloride 103   CO2 29   BUN 18   Creatinine 1.3   Glucose 85   Calcium 9.2   Alkaline Phosphatase 52 L   Total Protein 7.2   Albumin 4.2   Total Bilirubin 0.7   AST 26   ALT 46 H   ]      Assessment:     Tato Weaver is a 57 y.o. male with   1. Nephrolithiasis    2. Pre-op evaluation      Left uvj stone, multiple large left renal stones, r renal stones. Urinary hesitancy. Failed trial of passage. Pain not well controlled.     Plan:     Short plan:   To OR today for  cystoscopy, left ureteroscopy and stone extraction.  Retrograde pyelogram to evaluate for any calyceal diverticulum.  If not clearly seen may need a CT urogram prior to repeat ureteroscopy.  Patient states that he was told that some of the stones were in spots that were too hard to reach.  At can likely go home today after procedure unless there is any possibly had a kidney stone.  If there is then we can just place a stent and catheter in he off to stay overnight.  Would hold off on treating the stone if he is infected.  Will leave stent and then plan to return on November 2nd for ureteroscopy and stone extraction of left renal stones.  Will need antibiotics prior to 2nd procedure.  Can go to work between procedures if tolerating stent.  Start potassium citrate 15 twice a day to help prevent stones on stents and honestly just to help prevent more stones.  Will need refills on Norco, Toradol     Special considerations:  Needs stone workup - 24 urine collection to be mailed to house and can be done 1 month after all the stents are out.  Pth   Need to monitor stones on right  Needs PSA eventually once all stents are out- family history  Need bmp repeat, no baseline- last cr in 2010    See below for details:   The spontaneous passage rate in 20 weeks was 312 out of 392 stones, 98% in 0-2 mm, 98% in 3 mm, 81% in 4 mm, 65% in 5 mm, 33% in 6 mm and 9% in ?6.5 mm wide stones.  The stone size and location predicted spontaneous ureteric stone passage. The side and the grade of hydronephrosis only predicted stone passage in specific subgroups    The patient was counseled to go to ER before this if they have fever, chills or pain not able to controlled with pain medication.     The patient has been scheduled for: cystoscopy with left stent placement and possible ureteroscopy and stone extraction     If stent/ureteroscopy planned:  If infection/pus is seen proximal/behind the stone after placement of wire, then the pt will only  have a ureteral stent and a catheter placed to maximally drain infection. They will be admitted for IV abx and the gupta will remain until patient's white blood cell count is normal/lower and they do not demonstrate any fevers for 24 hours.   then we will return in 2 to 4 weeks to do a stone extraction with or without laser once infection has been cleared. At that procedure the old stent will be removed and the stone/s will be fragmented and removed and a new ureteral stent replaced but likely on strings so the patient can remove themselves at a designated time so as to avoid another procedure to have the stent removed    If no infection is seen, will attempt to reach stone, laser/fragment it if necessary and then basket out the fragments.  A stent will be placed likely on strings to be removed at the designated time by the patient so as not to have to need another procedure to remove the stent.  it was explained to the patient that it is very easy to remove the stent and does not painful to the remove the stent but in fact the pain usually occurs within a few hours after the stent is removed and is managed with pain medicine.    TYPICAL UROLOGIC MEDICATIONS USED IN PATIENTS WITH STONES  Toradol/ketorolac 10mg (dispense 10)- this is a stronger form of ibuprofen, you can take up to every 8 hours but stop taking ibuprofen. Too much of this medicine can cause kidney failure or stomach ulcers. This helps with the inflammation the body is experiencing from the stent. Take this with food. If you have kidney disease then do not take this medication, even if it was written for you.  Norco/Hydrocodone 5mg (dispense 15) or Tramadol-  take 1 every 4 to 6 hours for pain not relieved with ibuprofen or Tylenol. If you are taking this regularly you will need to take miralax or stool softeners daily to prevent constipation.   Flomax/Tamsulosin 0.4mg mg (dispense 30) - take once nightly before bedtime (at least 3 hours apart from  other high blood pressure medicines) either while trying to pass stones or while the stent is in and for 1 week after stent removed to help relax the tube the stent is in. If you are taking amlodipine/norvasc for blood pressure, take at least four hours apart (preferably norvasc in morning and flomax/tamsulosin after dinner)  Antibiotics - antibiotics were given just before your procedure that will stay in your system for a while.  If you were written for oral antibiotics, take twice daily. Your doctor will specify amount of days to take. If you have another scheduled procedure to have the stone removed more than 2 weeks after the initial procedure, she may ask you to save antibiotics and restart them 3 days before your next procedure.   AZO over the counter -  you can find this at [facility]. Choose one for bladder pain and anti-spasmodic. You can take it up to 3x a day for 3-4 days as long as you have normal kidney function. It may stain your urine and your clothes  Potassium citrate 15meq Twice a day to help increase dietary citrate and prevent stone formation on the stents - expect to see this in the stool, the coating contains the medication.   Ditropan 10mg/Oxybutynin 10mg XL, dispense 30 (take once daily as needed for bladder spasms- can cause constipation, take with stool softeners or fiber gummies)      A ureteral stent is left in place for many reasons:  to keep the ureter open after a procedure as there will be much inflammation and if no stent is left you will experience the same pain as having the stone that caused the obstruction.   to drain the kidney of infection.  if the stone is up high and closer to the kideny, if the stone is stuck, or you have an infection, the stent will stretch open the small ureter (the tube that drains the kidney) for a few weeks (usually 2 to 4 weeks max) so that we can return, remove the old stent, place instruments into the ureter or kidney to break up and remove the stone.  Sometimes you may need another stent after this procedure.     Ureteral Stent Symptoms can include but are not limited to   Stabbing pain in the kidney or bladder with urination during or especially at the end of voiding. Usually we write you for flomax/tamsulosin and toradol/ketorolac to help stretch and relax the ureter while you have the stent in place. We cannot write for toradol/ketorolac if you have poor kidney function. Please call if you have not received these prescriptions.  constant urge to urinate, frequency of urination, severe bladder spasms and severe pain the kidney, especially during urination. If this is very bothersome we can write you for ditropan, an overactive bladder medication to take short term to help with the spasms.   blood in the urine, especially with increased activity. This can last the entire time the stent is in, it can sometimes clear and return or you may never have any at all. I recommend increasing fluid intake to prevent large clots that may be difficult to urinate out.                              Dayana An MD

## 2022-10-11 NOTE — ANESTHESIA PREPROCEDURE EVALUATION
10/11/2022  Tato Weaver is a 57 y.o., male.      Pre-op Assessment    I have reviewed the Patient Summary Reports.     I have reviewed the Nursing Notes. I have reviewed the NPO Status.   I have reviewed the Medications.     Review of Systems  Anesthesia Hx:  No problems with previous Anesthesia    Social:  Former Smoker    Hematology/Oncology:  Hematology Normal   Oncology Normal     EENT/Dental:EENT/Dental Normal   Cardiovascular:   Hypertension    Pulmonary:  Pulmonary Normal    Renal/:   renal calculi    Hepatic/GI:  Hepatic/GI Normal    Musculoskeletal:  Musculoskeletal Normal    Neurological:  Neurology Normal    Endocrine:  Obesity / BMI > 30  Dermatological:  Skin Normal    Psych:  Psychiatric Normal           Physical Exam  General: Well nourished, Cooperative, Alert and Oriented    Airway:  Mallampati: II   Mouth Opening: Normal  TM Distance: Normal  Tongue: Normal    Dental:  Intact        Anesthesia Plan  Type of Anesthesia, risks & benefits discussed:    Anesthesia Type: Gen Supraglottic Airway  Intra-op Monitoring Plan: Standard ASA Monitors  Post Op Pain Control Plan: multimodal analgesia and IV/PO Opioids PRN  Induction:  IV  Airway Plan: Direct  Informed Consent: Informed consent signed with the Patient and all parties understand the risks and agree with anesthesia plan.  All questions answered.   ASA Score: 2  Day of Surgery Review of History & Physical: H&P Update referred to the surgeon/provider.    Ready For Surgery From Anesthesia Perspective.     .

## 2022-10-12 ENCOUNTER — TELEPHONE (OUTPATIENT)
Dept: UROLOGY | Facility: CLINIC | Age: 57
End: 2022-10-12
Payer: COMMERCIAL

## 2022-10-12 VITALS
WEIGHT: 250 LBS | BODY MASS INDEX: 35 KG/M2 | RESPIRATION RATE: 16 BRPM | TEMPERATURE: 98 F | DIASTOLIC BLOOD PRESSURE: 81 MMHG | SYSTOLIC BLOOD PRESSURE: 135 MMHG | HEIGHT: 71 IN | HEART RATE: 51 BPM | OXYGEN SATURATION: 100 %

## 2022-10-12 NOTE — TELEPHONE ENCOUNTER
----- Message from Dayana An MD sent at 10/11/2022  8:28 PM CDT -----  Bmp in a week   Lab ua and cx at [facility] 7d prior to repeat procedure

## 2022-10-12 NOTE — PLAN OF CARE
Meets criteria for discharge. Discharged to home with wife. Denies nausea. Tolerating fluids. Denies pain. Steady gait with assist. Voiding without difficulty via catheter. Discharge instructions reviewed and printed handout given. Verbalized understanding.

## 2022-10-12 NOTE — DISCHARGE SUMMARY
[facility]  Urology  Discharge Note - Short Stay      Patient Name: Tato Weaver  MRN: [id number]  Discharge Date and Time:  10/11/2022 8:32 PM  Attending Physician: Dayana An,*   Discharging Provider: Dayana An MD  Primary Care Physician: Nixon Denny MD    There are no hospital problems to display for this patient.      Final Diagnoses: Same as principal problem.    Hospital Course: Patient was admitted for an outpatient procedure and tolerated the procedure well with no complications.*    Procedure(s) (LRB):  CYSTOURETEROSCOPY,WITH HOLMIUM LASER LITHOTRIPSY OF URETERAL CALCULUS (Left)  PYELOGRAM, RETROGRADE (Left)  INSERTION, STENT, URETER (N/A)     Indwelling Lines/Drains at time of discharge:   Lines/Drains/Airways       Drain  Duration                  Urethral Catheter 10/11/22 1923 Coude 16 Fr. <1 day                    Discharged Condition: good    Disposition: home    Follow Up:      Patient Instructions:      Urine Culture High Risk   Standing Status: Future Standing Exp. Date: 04/11/24     Basic Metabolic Panel   Standing Status: Future Standing Exp. Date: 12/10/23     Urinalysis, Reflex to Urine Culture Urine, Clean Catch   Standing Status: Future Standing Exp. Date: 04/11/24     Order Specific Question Answer Comments   Preferred Collection Type Urine, Clean Catch    Specimen Source Urine        Medications:  Reconciled Home Medications:      Medication List        START taking these medications      doxycycline 100 MG tablet  Commonly known as: VIBRA-TABS  Take 1 tablet (100 mg total) by mouth 2 (two) times daily. Bid x 10d then restart 3d prior to his next procedure for 14 days     ketorolac 10 mg tablet  Commonly known as: TORADOL  Take 1 tablet (10 mg total) by mouth every 8 (eight) hours as needed for Pain.     oxybutynin 10 MG 24 hr tablet  Commonly known as: DITROPAN-XL  Take 1 tablet (10 mg total) by mouth once daily.     traMADoL 50 mg  tablet  Commonly known as: ULTRAM  Take 1 tablet (50 mg total) by mouth every 6 (six) hours as needed for Pain.            CONTINUE taking these medications      levothyroxine 200 MCG tablet  Commonly known as: SYNTHROID  Take 200 mcg by mouth before breakfast.     losartan 50 MG tablet  Commonly known as: COZAAR  Take 50 mg by mouth once daily.     naproxen 500 MG tablet  Commonly known as: NAPROSYN  Take 1 tablet (500 mg total) by mouth 2 (two) times daily with meals.     potassium citrate 15 mEq Tbsr  Commonly known as: UROCIT-K 15  Take 15 mEq by mouth 2 (two) times a day. Take twice daily for stone prevention. Will see in still     tamsulosin 0.4 mg Cap  Commonly known as: FLOMAX  Take 1 capsule (0.4 mg total) by mouth once daily.     valsartan 160 MG tablet  Commonly known as: DIOVAN            STOP taking these medications      HYDROcodone-acetaminophen 5-325 mg per tablet  Commonly known as: NORCO              Discharge Procedure Orders (must include Diet, Follow-up, Activity):   Discharge Procedure Orders (must include Diet, Follow-up, Activity)   Urine Culture High Risk   Standing Status: Future Standing Exp. Date: 04/11/24     Basic Metabolic Panel   Standing Status: Future Standing Exp. Date: 12/10/23     Urinalysis, Reflex to Urine Culture Urine, Clean Catch   Standing Status: Future Standing Exp. Date: 04/11/24     Order Specific Question Answer Comments   Preferred Collection Type Urine, Clean Catch    Specimen Source Urine         position:      Return on Wednesday November 2nd for left ureteroscopy and stone extraction of remaining stone(s)-   At the next procedure the old stent will be removed, the stone treated with laser (if large) and then fragments removed with a basket. A new stent, likely on strings, will need to be placed to allow ureter to heal. If no string placed you will need one more procedure to have the stent removed while awake a few weeks later. [facility] will  contact the patient the day before with time.   The patient will not see  until the follow up procedure. However the patient should contact  via [facility] or her office with any question  The patient will need a urine sample 7days prior to the procedure at [facility]. 's nurse will schedule this if needed.   Repeat bmp 1 week later  Remove foely at home tomorrow at 10am     or continue the following medications from the pharmacy unless they have some remaining at home:  Tramadol dispense 15 (take every 8 hours as needed for pain- can cause constipation, take with stool softeners or fiber gummies)  Flomax/tamsulosin 0.4mg, dispense 30 (take nightly until 1 week after stent removed to help relax ureter/stent pain - take at night before bedtime, can cause lightheadedness with standing)  doxy twice a day for 10 more days, dispense 28(if patient has at least 2 days antibiotics remaining after last procedure can take these instead)   keep the remaining and restart 3 days prior to stent removal  AZO available over the counter (in the UTI section) to help with burning with urination and bladder spasms after the procedure. Take one pill/capsule 2 to 3x as a day as needed.   start Potassium citrate 15meq Twice a day to help increase dietary citrate and prevent stone formation on the stents - expect to see this in the stool, the coating contains the medication.    Ditropan 10mg/Oxybutynin 10mg XL, dispense 30 (take once daily as needed for bladder spasms- can cause constipation, take with stool softeners or fiber gummies    Still needs:   stone workup which will involve collecting urine for 24 hours and blood tests to help determine risks for stone formation  To help figure out how to decrease the patient's rate of stone production: The patient should complete a 24 hour urine collection kit 4 weeks after the stent comes out. The collection kit should be mailed by [employer].  Contact the office to have mailed to you if one was not sent within the next few weeks. Everything should be able to be done from home. UPS comes and picks up the urine.  Screening psa     Dayana An MD  Urology  [facility]

## 2022-10-12 NOTE — TRANSFER OF CARE
"Anesthesia Transfer of Care Note    Patient: Tato Weaver    Procedure(s) Performed: Procedure(s) (LRB):  CYSTOURETEROSCOPY,WITH HOLMIUM LASER LITHOTRIPSY OF URETERAL CALCULUS (Left)  PYELOGRAM, RETROGRADE (Left)  INSERTION, STENT, URETER (N/A)    Patient location: PACU    Anesthesia Type: general    Transport from OR: Transported from OR on 2-3 L/min O2 by NC with adequate spontaneous ventilation    Post pain: adequate analgesia    Post assessment: no apparent anesthetic complications    Post vital signs: stable    Level of consciousness: awake    Nausea/Vomiting: no nausea/vomiting    Complications: none    Transfer of care protocol was followed      Last vitals:   Visit Vitals  BP (!) 155/85   Pulse (!) 52   Temp 36.2 °C (97.2 °F)   Resp 16   Ht 5' 11" (1.803 m)   Wt 113.4 kg (250 lb)   SpO2 99%   BMI 34.87 kg/m²     "

## 2022-10-12 NOTE — ANESTHESIA POSTPROCEDURE EVALUATION
Anesthesia Post Evaluation    Patient: Tato Weaver    Procedure(s) Performed: Procedure(s) (LRB):  CYSTOURETEROSCOPY,WITH HOLMIUM LASER LITHOTRIPSY OF URETERAL CALCULUS (Left)  PYELOGRAM, RETROGRADE (Left)  INSERTION, STENT, URETER (N/A)    Final Anesthesia Type: general      Patient location during evaluation: PACU  Patient participation: Yes- Able to Participate  Level of consciousness: awake and alert  Post-procedure vital signs: reviewed and stable  Pain management: adequate  Airway patency: patent    PONV status at discharge: No PONV  Anesthetic complications: no      Cardiovascular status: stable  Respiratory status: unassisted and room air  Hydration status: euvolemic            Vitals Value Taken Time   /86 10/11/22 2020   Temp 36.7 °C (98.1 °F) 10/11/22 2020   Pulse 54 10/11/22 2022   Resp 17 10/11/22 2022   SpO2 99 % 10/11/22 2022   Vitals shown include unvalidated device data.      Event Time   Out of Recovery 20:20:00         Pain/Brittanie Score: Pain Rating Prior to Med Admin: 9 (10/10/2022 12:09 PM)  Pain Rating Post Med Admin: 2 (10/10/2022 12:14 PM)  Brittanie Score: 10 (10/11/2022  8:15 PM)

## 2022-10-12 NOTE — DISCHARGE INSTRUCTIONS
"Discharge Instructions: After Your Surgery/Procedure  Youve just had surgery. During surgery you were given medicine called anesthesia to keep you relaxed and free of pain. After surgery you may have some pain or nausea. This is common. Here are some tips for feeling better and getting well after surgery.     Stay on schedule with your medication.   Going home  Your doctor or nurse will show you how to take care of yourself when you go home. He or she will also answer your questions. Have an adult family member or friend drive you home.      For your safety we recommend these precaution for the first 24 hours after your procedure:  Do not drive or use heavy equipment.  Do not make important decisions or sign legal papers.  Do not drink alcohol.  Have someone stay with you, if needed. He or she can watch for problems and help keep you safe.  Your concentration, balance, coordination, and judgement may be impaired for many hours after anesthesia.  Use caution when ambulating or standing up.     You may feel weak and "washed out" after anesthesia and surgery.      Subtle residual effects of general anesthesia or sedation with regional / local anesthesia can last more than 24 hours.  Rest for the remainder of the day or longer if your Doctor/Surgeon has advised you to do so.  Although you may feel normal within the first 24 hours, your reflexes and mental ability may be impaired without you realizing it.  You may feel dizzy, lightheaded or sleepy for 24 hours or longer.      Be sure to go to all follow-up visits with your doctor. And rest after your surgery for as long as your doctor tells you to.  Coping with pain  If you have pain after surgery, pain medicine will help you feel better. Take it as told, before pain becomes severe. Also, ask your doctor or pharmacist about other ways to control pain. This might be with heat, ice, or relaxation. And follow any other instructions your surgeon or nurse gives you.  Tips " for taking pain medicine  To get the best relief possible, remember these points:  Pain medicines can upset your stomach. Taking them with a little food may help.  Most pain relievers taken by mouth need at least 20 to 30 minutes to start to work.  Taking medicine on a schedule can help you remember to take it. Try to time your medicine so that you can take it before starting an activity. This might be before you get dressed, go for a walk, or sit down for dinner.  Constipation is a common side effect of pain medicines. Call your doctor before taking any medicines such as laxatives or stool softeners to help ease constipation. Also ask if you should skip any foods. Drinking lots of fluids and eating foods such as fruits and vegetables that are high in fiber can also help. Remember, do not take laxatives unless your surgeon has prescribed them.  Drinking alcohol and taking pain medicine can cause dizziness and slow your breathing. It can even be deadly. Do not drink alcohol while taking pain medicine.  Pain medicine can make you react more slowly to things. Do not drive or run machinery while taking pain medicine.  Your health care provider may tell you to take acetaminophen to help ease your pain. Ask him or her how much you are supposed to take each day. Acetaminophen or other pain relievers may interact with your prescription medicines or other over-the-counter (OTC) drugs. Some prescription medicines have acetaminophen and other ingredients. Using both prescription and OTC acetaminophen for pain can cause you to overdose. Read the labels on your OTC medicines with care. This will help you to clearly know the list of ingredients, how much to take, and any warnings. It may also help you not take too much acetaminophen. If you have questions or do not understand the information, ask your pharmacist or health care provider to explain it to you before you take the OTC medicine.  Managing nausea  Some people have an  upset stomach after surgery. This is often because of anesthesia, pain, or pain medicine, or the stress of surgery. These tips will help you handle nausea and eat healthy foods as you get better. If you were on a special food plan before surgery, ask your doctor if you should follow it while you get better. These tips may help:  Do not push yourself to eat. Your body will tell you when to eat and how much.  Start off with clear liquids and soup. They are easier to digest.  Next try semi-solid foods, such as mashed potatoes, applesauce, and gelatin, as you feel ready.  Slowly move to solid foods. Dont eat fatty, rich, or spicy foods at first.  Do not force yourself to have 3 large meals a day. Instead eat smaller amounts more often.  Take pain medicines with a small amount of solid food, such as crackers or toast, to avoid nausea.     Call your surgeon if  You still have pain an hour after taking medicine. The medicine may not be strong enough.  You feel too sleepy, dizzy, or groggy. The medicine may be too strong.  You have side effects like nausea, vomiting, or skin changes, such as rash, itching, or hives.       If you have obstructive sleep apnea  You were given anesthesia medicine during surgery to keep you comfortable and free of pain. After surgery, you may have more apnea spells because of this medicine and other medicines you were given. The spells may last longer than usual.   At home:  Keep using the continuous positive airway pressure (CPAP) device when you sleep. Unless your health care provider tells you not to, use it when you sleep, day or night. CPAP is a common device used to treat obstructive sleep apnea.  Talk with your provider before taking any pain medicine, muscle relaxants, or sedatives. Your provider will tell you about the possible dangers of taking these medicines.  © 8327-0741 [employer]. [address] [place] [address], [place] [address]. All rights reserved. This information is  not intended as a substitute for professional medical care. Always follow your healthcare professional's instructions.   Post op instructions for prevention of DVT  What is deep vein thrombosis?  Deep vein thrombosis (DVT) is the medical term for blood clots in the deep veins of the leg.  These blood clots can be dangerous.  A DVT can block a blood vessel and keep blood from getting where it needs to go.  Another problem is that the clot can travel to other parts of the body such as the lungs.  A clot that travels to the lungs is called a pulmonary embolus (PE) and can cause serious problems with breathing which can lead to death.  Am I at risk for DVT/PE?  If you are not very active, you are at risk of DVT.  Anyone confined to bed, sitting for long periods of time, recovering from surgery, etc. increases the risk of DVT.  Other risk factors are cancer diagnosis, certain medications, estrogen replacement in any form,older age, obesity, pregnancy, smoking, history of clotting disorders, and dehydration.  How will I know if I have a DVT?  Swelling in the lower leg  Pain  Warmth, redness, hardness or bulging of the vein  If you have any of these symptoms, call your doctors office right away.  Some people will not have any symptoms until the clot moves to the lungs.  What are the symptoms of a PE?  Panting, shortness of breath, or trouble breathing  Sharp, knife-like chest pain when you breathe  Coughing or coughing up blood  Rapid heartbeat  If you have any of these symptoms or get worse quickly, call 911 for emergency treatment.  How can I prevent a DVT?  Avoid long periods of inactivity and dont cross your legs--get up and walk around every hour or so.  Stay active--walking after surgery is highly encouraged.  This means you should get out of the house and walk in the neighborhood.  Going up and down stairs will not impair healing (unless advised against such activity by your doctor).    Drink plenty of  noncaffeinated, nonalcoholic fluids each day to prevent dehydration.  Wear special support stockings, if they have been advised by your doctor.  If you travel, stop at least once an hour and walk around.  Avoid smoking (assistance with stopping is available through your healthcare provider)  Always notify your doctor if you are not able to follow the post operative instructions that are given to you at the time of discharge.  It may be necessary to prescribe one of the medications available to prevent DVT.     Using an Incentive Spirometer    An incentive spirometer is a device that helps you do deep breathing exercises. These exercises expand your lungs, aid in circulation, and help prevent pneumonia. Deep breathing exercises also help you breathe better and improve the function of your lungs by:  Keeping your lungs clear  Strengthening your breathing muscles  Helping prevent respiratory complications or problems  The incentive spirometer gives you a way to take an active part in recover. A nurse or therapist will teach you breathing exercises. To do these exercises, you will breathe in through your mouth and not your nose. The incentive spirometer only works correctly if you breathe in through your mouth.  Steps to clear lungs  Step 1. Exhale normally. Then, inhale normally.  Relax and breathe out.  Step 2. Place your lips tightly around the mouthpiece.  Make sure the device is upright and not tilted.  Step 3. Inhale as much air as you can through the mouthpiece (don't breath through your nose).  Inhale slowly and deeply.  Hold your breath long enough to keep the balls or disk raised for at least 3 to 5 seconds, or as instructed by your healthcare provider.  Some spirometers have an indicator to let you know that you are breathing in too fast. If the indicator goes off, breathe in more slowly.  Step 4. Repeat the exercise regularly.  Do this exercise every hour while you're awake, or as instructed by your  healthcare provider.  If you were taught deep breathing and coughing exercises, do them regularly as instructed by your healthcare provider.

## 2022-10-13 LAB — BACTERIA UR CULT: NO GROWTH

## 2022-10-18 LAB
COMPN STONE: NORMAL
SPECIMEN SOURCE: NORMAL
STONE ANALYSIS IR-IMP: NORMAL

## 2022-10-19 ENCOUNTER — LAB VISIT (OUTPATIENT)
Dept: LAB | Facility: HOSPITAL | Age: 57
End: 2022-10-19
Attending: UROLOGY
Payer: COMMERCIAL

## 2022-10-19 DIAGNOSIS — N20.0 NEPHROLITHIASIS: ICD-10-CM

## 2022-10-19 LAB
ANION GAP SERPL CALC-SCNC: 11 MMOL/L (ref 8–16)
ANION GAP SERPL CALC-SCNC: 11 MMOL/L (ref 8–16)
BASOPHILS # BLD AUTO: 0.08 K/UL (ref 0–0.2)
BASOPHILS NFR BLD: 1.4 % (ref 0–1.9)
BUN SERPL-MCNC: 20 MG/DL (ref 6–20)
BUN SERPL-MCNC: 20 MG/DL (ref 6–20)
CALCIUM SERPL-MCNC: 9.4 MG/DL (ref 8.7–10.5)
CALCIUM SERPL-MCNC: 9.4 MG/DL (ref 8.7–10.5)
CHLORIDE SERPL-SCNC: 103 MMOL/L (ref 95–110)
CHLORIDE SERPL-SCNC: 103 MMOL/L (ref 95–110)
CO2 SERPL-SCNC: 27 MMOL/L (ref 23–29)
CO2 SERPL-SCNC: 27 MMOL/L (ref 23–29)
CREAT SERPL-MCNC: 1.2 MG/DL (ref 0.5–1.4)
CREAT SERPL-MCNC: 1.2 MG/DL (ref 0.5–1.4)
DIFFERENTIAL METHOD: ABNORMAL
EOSINOPHIL # BLD AUTO: 0.2 K/UL (ref 0–0.5)
EOSINOPHIL NFR BLD: 3.4 % (ref 0–8)
ERYTHROCYTE [DISTWIDTH] IN BLOOD BY AUTOMATED COUNT: 12.7 % (ref 11.5–14.5)
EST. GFR  (NO RACE VARIABLE): >60 ML/MIN/1.73 M^2
EST. GFR  (NO RACE VARIABLE): >60 ML/MIN/1.73 M^2
GLUCOSE SERPL-MCNC: 96 MG/DL (ref 70–110)
GLUCOSE SERPL-MCNC: 96 MG/DL (ref 70–110)
HCT VFR BLD AUTO: 45.2 % (ref 40–54)
HGB BLD-MCNC: 15.7 G/DL (ref 14–18)
IMM GRANULOCYTES # BLD AUTO: 0.03 K/UL (ref 0–0.04)
IMM GRANULOCYTES NFR BLD AUTO: 0.5 % (ref 0–0.5)
LYMPHOCYTES # BLD AUTO: 2.1 K/UL (ref 1–4.8)
LYMPHOCYTES NFR BLD: 35.1 % (ref 18–48)
MCH RBC QN AUTO: 33.6 PG (ref 27–31)
MCHC RBC AUTO-ENTMCNC: 34.7 G/DL (ref 32–36)
MCV RBC AUTO: 97 FL (ref 82–98)
MONOCYTES # BLD AUTO: 0.5 K/UL (ref 0.3–1)
MONOCYTES NFR BLD: 8 % (ref 4–15)
NEUTROPHILS # BLD AUTO: 3.1 K/UL (ref 1.8–7.7)
NEUTROPHILS NFR BLD: 51.6 % (ref 38–73)
NRBC BLD-RTO: 0 /100 WBC
PLATELET # BLD AUTO: 217 K/UL (ref 150–450)
PMV BLD AUTO: 10.1 FL (ref 9.2–12.9)
POTASSIUM SERPL-SCNC: 4.1 MMOL/L (ref 3.5–5.1)
POTASSIUM SERPL-SCNC: 4.1 MMOL/L (ref 3.5–5.1)
RBC # BLD AUTO: 4.67 M/UL (ref 4.6–6.2)
SODIUM SERPL-SCNC: 141 MMOL/L (ref 136–145)
SODIUM SERPL-SCNC: 141 MMOL/L (ref 136–145)
WBC # BLD AUTO: 5.9 K/UL (ref 3.9–12.7)

## 2022-10-19 PROCEDURE — 80048 BASIC METABOLIC PNL TOTAL CA: CPT | Performed by: UROLOGY

## 2022-10-19 PROCEDURE — 85025 COMPLETE CBC W/AUTO DIFF WBC: CPT | Performed by: UROLOGY

## 2022-10-19 PROCEDURE — 36415 COLL VENOUS BLD VENIPUNCTURE: CPT | Performed by: UROLOGY

## 2022-10-24 ENCOUNTER — PATIENT MESSAGE (OUTPATIENT)
Dept: SURGERY | Facility: HOSPITAL | Age: 57
End: 2022-10-24
Payer: COMMERCIAL

## 2022-10-25 ENCOUNTER — PATIENT MESSAGE (OUTPATIENT)
Dept: SURGERY | Facility: HOSPITAL | Age: 57
End: 2022-10-25
Payer: COMMERCIAL

## 2022-10-25 RX ORDER — TAMSULOSIN HYDROCHLORIDE 0.4 MG/1
0.4 CAPSULE ORAL DAILY
Qty: 90 CAPSULE | Refills: 3 | Status: ON HOLD | OUTPATIENT
Start: 2022-10-25 | End: 2022-11-02 | Stop reason: SDUPTHER

## 2022-10-26 ENCOUNTER — LAB VISIT (OUTPATIENT)
Dept: LAB | Facility: HOSPITAL | Age: 57
End: 2022-10-26
Attending: UROLOGY
Payer: COMMERCIAL

## 2022-10-26 DIAGNOSIS — N20.0 NEPHROLITHIASIS: ICD-10-CM

## 2022-10-26 LAB
BACTERIA #/AREA URNS HPF: NEGATIVE /HPF
BILIRUB UR QL STRIP: NEGATIVE
CLARITY UR: CLEAR
COLOR UR: YELLOW
GLUCOSE UR QL STRIP: NEGATIVE
HGB UR QL STRIP: ABNORMAL
HYALINE CASTS #/AREA URNS LPF: 1 /LPF
KETONES UR QL STRIP: NEGATIVE
LEUKOCYTE ESTERASE UR QL STRIP: ABNORMAL
MICROSCOPIC COMMENT: ABNORMAL
NITRITE UR QL STRIP: NEGATIVE
PH UR STRIP: 6 [PH] (ref 5–8)
PROT UR QL STRIP: NEGATIVE
RBC #/AREA URNS HPF: 9 /HPF (ref 0–4)
SP GR UR STRIP: 1.01 (ref 1–1.03)
SQUAMOUS #/AREA URNS HPF: 1 /HPF
URN SPEC COLLECT METH UR: ABNORMAL
UROBILINOGEN UR STRIP-ACNC: NEGATIVE EU/DL
WBC #/AREA URNS HPF: 3 /HPF (ref 0–5)

## 2022-10-26 PROCEDURE — 81001 URINALYSIS AUTO W/SCOPE: CPT | Performed by: UROLOGY

## 2022-10-26 PROCEDURE — 87086 URINE CULTURE/COLONY COUNT: CPT | Performed by: UROLOGY

## 2022-10-29 LAB — BACTERIA UR CULT: NO GROWTH

## 2022-10-31 ENCOUNTER — TELEPHONE (OUTPATIENT)
Dept: UROLOGY | Facility: CLINIC | Age: 57
End: 2022-10-31
Payer: COMMERCIAL

## 2022-10-31 RX ORDER — CIPROFLOXACIN 500 MG/1
500 TABLET ORAL EVERY 12 HOURS
Qty: 14 TABLET | Refills: 0 | Status: SHIPPED | OUTPATIENT
Start: 2022-10-31 | End: 2022-11-07

## 2022-10-31 NOTE — TELEPHONE ENCOUNTER
Please make sure pt started their doxycyline again.   Procedure scheduled for this wed. To remove remaining L renal stones.   If he doesn't have any I can send in cipro to start today (sent already in case)  But if he does have some then rstart the doxy

## 2022-11-01 ENCOUNTER — ANESTHESIA EVENT (OUTPATIENT)
Dept: SURGERY | Facility: HOSPITAL | Age: 57
End: 2022-11-01
Payer: COMMERCIAL

## 2022-11-01 RX ORDER — SODIUM CHLORIDE, SODIUM LACTATE, POTASSIUM CHLORIDE, CALCIUM CHLORIDE 600; 310; 30; 20 MG/100ML; MG/100ML; MG/100ML; MG/100ML
500 INJECTION, SOLUTION INTRAVENOUS ONCE
Status: CANCELLED | OUTPATIENT
Start: 2022-11-01 | End: 2022-11-01

## 2022-11-01 RX ORDER — SODIUM CHLORIDE, SODIUM LACTATE, POTASSIUM CHLORIDE, CALCIUM CHLORIDE 600; 310; 30; 20 MG/100ML; MG/100ML; MG/100ML; MG/100ML
10 INJECTION, SOLUTION INTRAVENOUS CONTINUOUS
Status: CANCELLED | OUTPATIENT
Start: 2022-11-01

## 2022-11-02 ENCOUNTER — ANESTHESIA (OUTPATIENT)
Dept: SURGERY | Facility: HOSPITAL | Age: 57
End: 2022-11-02
Payer: COMMERCIAL

## 2022-11-02 ENCOUNTER — HOSPITAL ENCOUNTER (OUTPATIENT)
Facility: HOSPITAL | Age: 57
Discharge: HOME | End: 2022-11-02
Attending: UROLOGY | Admitting: UROLOGY
Payer: COMMERCIAL

## 2022-11-02 DIAGNOSIS — N20.0 NEPHROLITHIASIS: ICD-10-CM

## 2022-11-02 PROCEDURE — C1773 RET DEV, INSERTABLE: HCPCS | Performed by: UROLOGY

## 2022-11-02 PROCEDURE — 94799 UNLISTED PULMONARY SVC/PX: CPT

## 2022-11-02 PROCEDURE — D9220A PRA ANESTHESIA: Mod: ANES,,, | Performed by: ANESTHESIOLOGY

## 2022-11-02 PROCEDURE — 99900104 DSU ONLY-NO CHARGE-EA ADD'L HR (STAT): Performed by: UROLOGY

## 2022-11-02 PROCEDURE — 52356 PR CYSTO/URETERO W/LITHOTRIPSY: ICD-10-PCS | Mod: 22,LT,, | Performed by: UROLOGY

## 2022-11-02 PROCEDURE — 27200651 HC AIRWAY, LMA: Performed by: ANESTHESIOLOGY

## 2022-11-02 PROCEDURE — 82365 CALCULUS SPECTROSCOPY: CPT | Performed by: UROLOGY

## 2022-11-02 PROCEDURE — 63600175 PHARM REV CODE 636 W HCPCS: Performed by: UROLOGY

## 2022-11-02 PROCEDURE — 25000003 PHARM REV CODE 250: Performed by: NURSE ANESTHETIST, CERTIFIED REGISTERED

## 2022-11-02 PROCEDURE — 36000706: Performed by: UROLOGY

## 2022-11-02 PROCEDURE — 71000039 HC RECOVERY, EACH ADD'L HOUR: Performed by: UROLOGY

## 2022-11-02 PROCEDURE — 36000707: Performed by: UROLOGY

## 2022-11-02 PROCEDURE — C1894 INTRO/SHEATH, NON-LASER: HCPCS | Performed by: UROLOGY

## 2022-11-02 PROCEDURE — 63600175 PHARM REV CODE 636 W HCPCS: Performed by: NURSE ANESTHETIST, CERTIFIED REGISTERED

## 2022-11-02 PROCEDURE — 37000008 HC ANESTHESIA 1ST 15 MINUTES: Performed by: UROLOGY

## 2022-11-02 PROCEDURE — 52356 CYSTO/URETERO W/LITHOTRIPSY: CPT | Mod: 22,LT,, | Performed by: UROLOGY

## 2022-11-02 PROCEDURE — 25500020 PHARM REV CODE 255: Performed by: UROLOGY

## 2022-11-02 PROCEDURE — D9220A PRA ANESTHESIA: ICD-10-PCS | Mod: ANES,,, | Performed by: ANESTHESIOLOGY

## 2022-11-02 PROCEDURE — C1769 GUIDE WIRE: HCPCS | Performed by: UROLOGY

## 2022-11-02 PROCEDURE — 71000033 HC RECOVERY, INTIAL HOUR: Performed by: UROLOGY

## 2022-11-02 PROCEDURE — 27201423 OPTIME MED/SURG SUP & DEVICES STERILE SUPPLY: Performed by: UROLOGY

## 2022-11-02 PROCEDURE — 99900103 DSU ONLY-NO CHARGE-INITIAL HR (STAT): Performed by: UROLOGY

## 2022-11-02 PROCEDURE — D9220A PRA ANESTHESIA: ICD-10-PCS | Mod: CRNA,,, | Performed by: NURSE ANESTHETIST, CERTIFIED REGISTERED

## 2022-11-02 PROCEDURE — 25000003 PHARM REV CODE 250: Performed by: ANESTHESIOLOGY

## 2022-11-02 PROCEDURE — D9220A PRA ANESTHESIA: Mod: CRNA,,, | Performed by: NURSE ANESTHETIST, CERTIFIED REGISTERED

## 2022-11-02 PROCEDURE — 37000009 HC ANESTHESIA EA ADD 15 MINS: Performed by: UROLOGY

## 2022-11-02 PROCEDURE — 71000015 HC POSTOP RECOV 1ST HR: Performed by: UROLOGY

## 2022-11-02 PROCEDURE — 74420 UROGRAPHY RTRGR +-KUB: CPT | Mod: 26,,, | Performed by: UROLOGY

## 2022-11-02 PROCEDURE — C2617 STENT, NON-COR, TEM W/O DEL: HCPCS | Performed by: UROLOGY

## 2022-11-02 PROCEDURE — 63600175 PHARM REV CODE 636 W HCPCS: Performed by: ANESTHESIOLOGY

## 2022-11-02 PROCEDURE — 74420 PR  X-RAY RETROGRADE PYELOGRAM: ICD-10-PCS | Mod: 26,,, | Performed by: UROLOGY

## 2022-11-02 DEVICE — STENT SET URETERAL 6X26CM: Type: IMPLANTABLE DEVICE | Site: URETER | Status: FUNCTIONAL

## 2022-11-02 RX ORDER — EPHEDRINE SULFATE 50 MG/ML
INJECTION, SOLUTION INTRAVENOUS
Status: DISCONTINUED | OUTPATIENT
Start: 2022-11-02 | End: 2022-11-02

## 2022-11-02 RX ORDER — OXYCODONE HYDROCHLORIDE 5 MG/1
5 TABLET ORAL ONCE AS NEEDED
Status: COMPLETED | OUTPATIENT
Start: 2022-11-02 | End: 2022-11-02

## 2022-11-02 RX ORDER — KETOROLAC TROMETHAMINE 10 MG/1
10 TABLET, FILM COATED ORAL EVERY 8 HOURS PRN
Qty: 10 TABLET | Refills: 0 | Status: SHIPPED | OUTPATIENT
Start: 2022-11-02 | End: 2022-12-08

## 2022-11-02 RX ORDER — TAMSULOSIN HYDROCHLORIDE 0.4 MG/1
0.4 CAPSULE ORAL DAILY
Qty: 90 CAPSULE | Refills: 3 | Status: SHIPPED | OUTPATIENT
Start: 2022-11-02 | End: 2022-11-02 | Stop reason: SDUPTHER

## 2022-11-02 RX ORDER — TRAMADOL HYDROCHLORIDE 50 MG/1
50 TABLET ORAL EVERY 6 HOURS PRN
Qty: 10 TABLET | Refills: 0 | Status: SHIPPED | OUTPATIENT
Start: 2022-11-02 | End: 2022-12-08

## 2022-11-02 RX ORDER — FENTANYL CITRATE 50 UG/ML
INJECTION, SOLUTION INTRAMUSCULAR; INTRAVENOUS
Status: DISCONTINUED | OUTPATIENT
Start: 2022-11-02 | End: 2022-11-02

## 2022-11-02 RX ORDER — SODIUM CHLORIDE 0.9 % (FLUSH) 0.9 %
3 SYRINGE (ML) INJECTION EVERY 8 HOURS
Status: DISCONTINUED | OUTPATIENT
Start: 2022-11-02 | End: 2022-11-02 | Stop reason: HOSPADM

## 2022-11-02 RX ORDER — ONDANSETRON 2 MG/ML
4 INJECTION INTRAMUSCULAR; INTRAVENOUS ONCE AS NEEDED
Status: COMPLETED | OUTPATIENT
Start: 2022-11-02 | End: 2022-11-02

## 2022-11-02 RX ORDER — TAMSULOSIN HYDROCHLORIDE 0.4 MG/1
0.4 CAPSULE ORAL DAILY
Qty: 90 CAPSULE | Refills: 3 | Status: SHIPPED | OUTPATIENT
Start: 2022-11-02 | End: 2023-10-30

## 2022-11-02 RX ORDER — MEPERIDINE HYDROCHLORIDE 50 MG/ML
12.5 INJECTION INTRAMUSCULAR; INTRAVENOUS; SUBCUTANEOUS ONCE AS NEEDED
Status: DISCONTINUED | OUTPATIENT
Start: 2022-11-02 | End: 2022-11-02 | Stop reason: HOSPADM

## 2022-11-02 RX ORDER — PROCHLORPERAZINE EDISYLATE 5 MG/ML
5 INJECTION INTRAMUSCULAR; INTRAVENOUS EVERY 30 MIN PRN
Status: DISCONTINUED | OUTPATIENT
Start: 2022-11-02 | End: 2022-11-02 | Stop reason: HOSPADM

## 2022-11-02 RX ORDER — HYDROMORPHONE HYDROCHLORIDE 2 MG/ML
0.2 INJECTION, SOLUTION INTRAMUSCULAR; INTRAVENOUS; SUBCUTANEOUS EVERY 5 MIN PRN
Status: DISCONTINUED | OUTPATIENT
Start: 2022-11-02 | End: 2022-11-02 | Stop reason: HOSPADM

## 2022-11-02 RX ORDER — DIPHENHYDRAMINE HYDROCHLORIDE 50 MG/ML
12.5 INJECTION INTRAMUSCULAR; INTRAVENOUS ONCE AS NEEDED
Status: DISCONTINUED | OUTPATIENT
Start: 2022-11-02 | End: 2022-11-02 | Stop reason: HOSPADM

## 2022-11-02 RX ORDER — DEXAMETHASONE SODIUM PHOSPHATE 4 MG/ML
INJECTION, SOLUTION INTRA-ARTICULAR; INTRALESIONAL; INTRAMUSCULAR; INTRAVENOUS; SOFT TISSUE
Status: DISCONTINUED | OUTPATIENT
Start: 2022-11-02 | End: 2022-11-02

## 2022-11-02 RX ORDER — LIDOCAINE HYDROCHLORIDE 10 MG/ML
1 INJECTION, SOLUTION EPIDURAL; INFILTRATION; INTRACAUDAL; PERINEURAL ONCE
Status: DISCONTINUED | OUTPATIENT
Start: 2022-11-02 | End: 2022-11-02 | Stop reason: HOSPADM

## 2022-11-02 RX ORDER — ACETAMINOPHEN 10 MG/ML
INJECTION, SOLUTION INTRAVENOUS
Status: DISCONTINUED | OUTPATIENT
Start: 2022-11-02 | End: 2022-11-02

## 2022-11-02 RX ORDER — KETOROLAC TROMETHAMINE 30 MG/ML
INJECTION, SOLUTION INTRAMUSCULAR; INTRAVENOUS
Status: DISCONTINUED | OUTPATIENT
Start: 2022-11-02 | End: 2022-11-02

## 2022-11-02 RX ORDER — ONDANSETRON 2 MG/ML
INJECTION INTRAMUSCULAR; INTRAVENOUS
Status: DISCONTINUED | OUTPATIENT
Start: 2022-11-02 | End: 2022-11-02

## 2022-11-02 RX ORDER — LIDOCAINE HYDROCHLORIDE 20 MG/ML
INJECTION INTRAVENOUS
Status: DISCONTINUED | OUTPATIENT
Start: 2022-11-02 | End: 2022-11-02

## 2022-11-02 RX ORDER — FENTANYL CITRATE 50 UG/ML
25 INJECTION, SOLUTION INTRAMUSCULAR; INTRAVENOUS EVERY 5 MIN PRN
Status: DISCONTINUED | OUTPATIENT
Start: 2022-11-02 | End: 2022-11-02 | Stop reason: HOSPADM

## 2022-11-02 RX ORDER — MIDAZOLAM HYDROCHLORIDE 1 MG/ML
INJECTION, SOLUTION INTRAMUSCULAR; INTRAVENOUS
Status: DISCONTINUED | OUTPATIENT
Start: 2022-11-02 | End: 2022-11-02

## 2022-11-02 RX ORDER — PROPOFOL 10 MG/ML
VIAL (ML) INTRAVENOUS
Status: DISCONTINUED | OUTPATIENT
Start: 2022-11-02 | End: 2022-11-02

## 2022-11-02 RX ADMIN — EPHEDRINE SULFATE 5 MG: 50 INJECTION, SOLUTION INTRAMUSCULAR; INTRAVENOUS; SUBCUTANEOUS at 08:11

## 2022-11-02 RX ADMIN — CEFTRIAXONE 1 G: 1 INJECTION, SOLUTION INTRAVENOUS at 07:11

## 2022-11-02 RX ADMIN — OXYCODONE 5 MG: 5 TABLET ORAL at 10:11

## 2022-11-02 RX ADMIN — EPHEDRINE SULFATE 10 MG: 50 INJECTION, SOLUTION INTRAMUSCULAR; INTRAVENOUS; SUBCUTANEOUS at 08:11

## 2022-11-02 RX ADMIN — PROPOFOL 200 MG: 10 INJECTION, EMULSION INTRAVENOUS at 07:11

## 2022-11-02 RX ADMIN — ACETAMINOPHEN 1000 MG: 10 INJECTION, SOLUTION INTRAVENOUS at 07:11

## 2022-11-02 RX ADMIN — MIDAZOLAM 2 MG: 1 INJECTION INTRAMUSCULAR; INTRAVENOUS at 07:11

## 2022-11-02 RX ADMIN — EPHEDRINE SULFATE 15 MG: 50 INJECTION, SOLUTION INTRAMUSCULAR; INTRAVENOUS; SUBCUTANEOUS at 08:11

## 2022-11-02 RX ADMIN — LIDOCAINE HYDROCHLORIDE 50 MG: 20 INJECTION, SOLUTION INTRAVENOUS at 07:11

## 2022-11-02 RX ADMIN — FENTANYL CITRATE 25 MCG: 50 INJECTION, SOLUTION INTRAMUSCULAR; INTRAVENOUS at 09:11

## 2022-11-02 RX ADMIN — ONDANSETRON 4 MG: 2 INJECTION INTRAMUSCULAR; INTRAVENOUS at 10:11

## 2022-11-02 RX ADMIN — FENTANYL CITRATE 25 MCG: 50 INJECTION INTRAMUSCULAR; INTRAVENOUS at 10:11

## 2022-11-02 RX ADMIN — KETOROLAC TROMETHAMINE 30 MG: 30 INJECTION, SOLUTION INTRAMUSCULAR; INTRAVENOUS at 09:11

## 2022-11-02 RX ADMIN — FENTANYL CITRATE 100 MCG: 50 INJECTION, SOLUTION INTRAMUSCULAR; INTRAVENOUS at 07:11

## 2022-11-02 RX ADMIN — ONDANSETRON 8 MG: 2 INJECTION INTRAMUSCULAR; INTRAVENOUS at 07:11

## 2022-11-02 RX ADMIN — SODIUM CHLORIDE, SODIUM GLUCONATE, SODIUM ACETATE, POTASSIUM CHLORIDE, MAGNESIUM CHLORIDE, SODIUM PHOSPHATE, DIBASIC, AND POTASSIUM PHOSPHATE: .53; .5; .37; .037; .03; .012; .00082 INJECTION, SOLUTION INTRAVENOUS at 07:11

## 2022-11-02 RX ADMIN — FENTANYL CITRATE 50 MCG: 50 INJECTION, SOLUTION INTRAMUSCULAR; INTRAVENOUS at 08:11

## 2022-11-02 RX ADMIN — DEXAMETHASONE SODIUM PHOSPHATE 4 MG: 4 INJECTION, SOLUTION INTRA-ARTICULAR; INTRALESIONAL; INTRAMUSCULAR; INTRAVENOUS; SOFT TISSUE at 07:11

## 2022-11-02 NOTE — OP NOTE
[facility]  Operative Note    Date: 11/02/2022    Pre-Op Diagnosis: left renal stone    Post-Op Diagnosis: same    Procedure(s) Performed:   Cystoscopy, left retrograde pyelogram  left stent exchange  left flexible nephroscopy, left laser lithotripsy and basket stone extraction  Flouro <1 hour    Request for 22 Modifier due to increased level of difficulty  An additional 60 minutes was spent lasering over 2cm of stone in the upper pole    Specimen(s): stone fragments from left kidney    Staff Surgeon: Dayana An MD    Anesthesia: General endotracheal anesthesia    Indications: Tato Weaver is a 57 y.o. male with above condition    Findings:  Mod bph. L uo minimal irritation.  Large stone in upper pole obstructing the upper pole.  Very inflamed around this.  Hard stone with soft stone around it.  Able to dust most of it and then fragment the rest and removed most of stone fragments.  However would like to obtain a CT in about a month.    Estimated Blood Loss: minimal    Drains: 6x26 with strings, left  Implants:   Implant Name Type Inv. Item Serial No.  Lot No. LRB No. Used Action   STENT URETERAL UNIV 6FR 26CM - [id number]  STENT URETERAL UNIV 6FR 26CM  [employer] [id number] Left 1 Explanted   STENT SET URETERAL 6X26CM - [id number]  STENT SET URETERAL 6X26CM  [employer] [id number] Left 1 Implanted       Implants:   Implant Name Type Inv. Item Serial No.  Lot No. LRB No. Used Action   STENT URETERAL UNIV 6FR 26CM - [id number]  STENT URETERAL UNIV 6FR 26CM  [employer] [id number] Left 1 Explanted   STENT SET URETERAL 6X26CM - [id number]  STENT SET URETERAL 6X26CM  [employer] [id number] Left 1 Implanted       Procedure in detail:  After informed consent was obtained, the patient was brought the the cystoscopy suite and placed in the supine position.  SCDs were applied and working.  GETA was administered.  The patient was then placed in the dorsal lithotomy position  and prepped and draped in the usual sterile fashion.      A rigid cystoscope in a 22 Fr sheath was introduced into the patient's urethra.  This passed easily.  The entire urethra was visualized which showed no strictures or masses.  Formal cystoscopy was performed which revealed no masses or lesions suspicious for malignancy.  The UOs were visualized in the normal anatomic position bilaterally. The left UO had mild  inflammation from the indwelling stent.     The left stent was grasped and brought to the meatus and a sensor tip wire was passed through the stent.  The stent was removed leaving the wire in place.     left rigid ureteroscopy performed first: A rigid ureteroscope was advanced along the wire to  proximal ureter. A rgp was performed through the ureteroscope with findings as above. An amplatz super stiff was then advanced through the ureteroscope to the kidney using flouro to confirm leaving a supers stiff and sensor tip wire in place.     A 14/16 45cm  ureteral access sheath was placed over the amplatz. Inner first,  then inner and outer and the inner sheath and amplatz was removed.  A flexible ureteroscope was passed through the outer sheath. The ureteroscope was advanced into the kidney.  A pyeloscopy was performed with findings as above.     A 273  micron laser fiber was passed through the ureteroscope.  The stone(s) were  fragmented using the laser.  The laser fiber was removed and an Ngage basket was introduced through the ureteroscope.   Stone fragments were removed using an Ngage basket. This took about an hour and a half.      The ureteroscope and ureteral access sheath was removed keeping a wire in place and evaluating the entire ureter for remaining stone fragments    The wire was backloaded through the stent using a pusher.    A JJ ureteral stent with strings (see findings for size) was passed over the wire and up into the left renal pelvis using fluoro.  When the coil appeared to be in good  position in the kidney and the radio-opaque marker of the pusher was at the inferior pubis, the wire was removed under continuous fluoro.  Good coils were seen in the kidney and the bladder using fluoro.      The bladder was emptied with cystoscope sheath.      The strings were attached to the penis using benzoin and tegaderm.     The patient tolerated the procedure well and was transferred to the recovery room in stable condition.        Plan:    Plan for stent removal on strings:  The patient can remove the stent by pulling strings on TUESDAY at 6am/early morning (take pain medications prior to removing, expect pain after stent removed but call office or go to ER if pain lasts >48 hours and is severe. See discharge instructions for details.   The stone was sent for an analysis from today and we can send the results to the patient directly.   Cancel 11/10 appt.   Return to clinic to see me in 5-6 weeks to discuss to discuss the 24 hour urine results (help determine why patient makes stones) and CT (before end of year). Order screening psa and 24 hour urine then. Can possibly schedule shock wave lithotripsy before end of year.      or continue the following medications from the pharmacy unless they have some remaining at home:  REFILLED Tramadol dispense 15 (take every 8 hours as needed for pain- can cause constipation, take with stool softeners or fiber gummies)  REFILLED Toradol/ketorolac 10mg, dispense 10 (take every 8 hours as needed for pain and alternate with norco, it's like a strong dose Ibuprofen so do not take Ibuprofen if taking this- take with food)  REFILLED AND CONTINUE Flomax/tamsulosin 0.4mg, dispense 90 - 1YEAR for bph  Continue antibiotics twice a day until completed.   AZO available over the counter (in the UTI section) to help with burning with urination and bladder spasms after the procedure. Take one pill/capsule 2 to 3x as a day as needed.   CONTINUE Potassium citrate 15meq Twice a day  to help increase dietary citrate and prevent stone formation on the stents - expect to see this in the stool, the coating contains the medication.

## 2022-11-02 NOTE — DISCHARGE SUMMARY
[facility]  Urology  Discharge Note - Short Stay      Patient Name: Tato Weaver  MRN: [id number]  Discharge Date and Time:  11/02/2022 10:17 AM  Attending Physician: Dayana An,*   Discharging Provider: Dayana An MD  Primary Care Physician: Nixon Denny MD    There are no hospital problems to display for this patient.      Final Diagnoses: Same as principal problem.    Hospital Course: Patient was admitted for an outpatient procedure and tolerated the procedure well with no complications.*    Procedure(s) (LRB):  LITHOTRIPSY, USING LASER (Left)  REMOVAL, CALCULUS, URETER, URETEROSCOPIC (Left)  CYSTOSCOPY, WITH RETROGRADE PYELOGRAM AND URETERAL STENT INSERTION (Left)     Indwelling Lines/Drains at time of discharge:   Lines/Drains/Airways       Drain  Duration                  Ureteral Drain/Stent 11/02/22 0927 Left ureter <1 day                    Discharged Condition: good    Disposition: home    Follow Up:      Patient Instructions:      CT Renal Stone Study ABD Pelvis WO   Standing Status: Future Standing Exp. Date: 11/02/23     Order Specific Question Answer Comments   May the Radiologist modify the order per protocol to meet the clinical needs of the patient? Yes      PSA, Screening   Standing Status: Future Standing Exp. Date: 01/01/24       Medications:  Reconciled Home Medications:      Medication List        CONTINUE taking these medications      ciprofloxacin HCl 500 MG tablet  Commonly known as: CIPRO  Take 1 tablet (500 mg total) by mouth every 12 (twelve) hours. for 7 days     ketorolac 10 mg tablet  Commonly known as: TORADOL  Take 1 tablet (10 mg total) by mouth every 8 (eight) hours as needed for Pain.     levothyroxine 200 MCG tablet  Commonly known as: SYNTHROID  Take 200 mcg by mouth before breakfast.     losartan 50 MG tablet  Commonly known as: COZAAR  Take 50 mg by mouth once daily.     naproxen 500 MG tablet  Commonly known as:  NAPROSYN  Take 1 tablet (500 mg total) by mouth 2 (two) times daily with meals.     potassium citrate 15 mEq Tbsr  Commonly known as: UROCIT-K 15  Take 15 mEq by mouth 2 (two) times a day. Take twice daily for stone prevention. Will see in still     tamsulosin 0.4 mg Cap  Commonly known as: FLOMAX  Take 1 capsule (0.4 mg total) by mouth once daily.     traMADoL 50 mg tablet  Commonly known as: ULTRAM  Take 1 tablet (50 mg total) by mouth every 6 (six) hours as needed for Pain.            STOP taking these medications      oxybutynin 10 MG 24 hr tablet  Commonly known as: DITROPAN-XL            ASK your doctor about these medications      valsartan 160 MG tablet  Commonly known as: DIOVAN              Discharge Procedure Orders (must include Diet, Follow-up, Activity):   Discharge Procedure Orders (must include Diet, Follow-up, Activity)   CT Renal Stone Study ABD Pelvis WO   Standing Status: Future Standing Exp. Date: 11/02/23     Order Specific Question Answer Comments   May the Radiologist modify the order per protocol to meet the clinical needs of the patient? Yes      PSA, Screening   Standing Status: Future Standing Exp. Date: 01/01/24      Plan for stent removal on strings:  The patient can remove the stent by pulling strings on TUESDAY at 6am/early morning (take pain medications prior to removing, expect pain after stent removed but call office or go to ER if pain lasts >48 hours and is severe. See discharge instructions for details.   The stone was sent for an analysis from today and we can send the results to the patient directly.   Cancel 11/10 appt.   Return to clinic to see me in 5-6 weeks to discuss to discuss the 24 hour urine results (help determine why patient makes stones) and CT (before end of year). Order screening psa and 24 hour urine then. Can possibly schedule shock wave lithotripsy before end of year.      or continue the following medications from the pharmacy unless they have some  remaining at home:  REFILLED Tramadol dispense 15 (take every 8 hours as needed for pain- can cause constipation, take with stool softeners or fiber gummies)  REFILLED Toradol/ketorolac 10mg, dispense 10 (take every 8 hours as needed for pain and alternate with norco, it's like a strong dose Ibuprofen so do not take Ibuprofen if taking this- take with food)  REFILLED AND CONTINUE Flomax/tamsulosin 0.4mg, dispense 90 - 1YEAR for bph  Continue antibiotics twice a day until completed.   AZO available over the counter (in the UTI section) to help with burning with urination and bladder spasms after the procedure. Take one pill/capsule 2 to 3x as a day as needed.   CONTINUE Potassium citrate 15meq Twice a day to help increase dietary citrate and prevent stone formation on the stents - expect to see this in the stool, the coating contains the medication.       Dayana An MD  Urology  [facility]

## 2022-11-02 NOTE — PLAN OF CARE
Meets criteria for discharge. Discharged to home with wife. Denies nausea. Tolerating fluids. Denies pain. Steady gait. Voiding without difficulty. Discharge instructions reviewed and printed handout given. Verbalized understanding.

## 2022-11-02 NOTE — PATIENT INSTRUCTIONS
LEAVE AT TOP OF DISCHARGE INSTRUCTIONS    VERY IMPORTANT INSTRUCTIONS FROM  REGARDING YOUR PROCEDURE- PLEASE READ    Your urologist is Dr.Jennifer An  Office number: [phone] ([facility])  Address: [address] [place] [address],  (2nd office building on left); [address] (located on 2nd floor).       The following are specific instructions for Tato Weaver is a 57 y.o. male, so please read CAREFULLY:    If you are on blood thinners and are unsure whether to continue, stop or restart them and it is not mentioned above, then call 's office for further directions        You have a ureteral stent in your left collecting system that was placed on 11/02/2022  -the stent can only remain for a maximum of 3 to 6 months. If the stent remains longer than this you can get recurrent urinary tract infections, the stent can have more stones form along it and urine will not be able to drain and you will lose all function of your kidney requiring a major surgery and a big incision to remove the kidney.  The ureter is the tube that drains the kidney (where urine is made). It connects the kidney to the bladder (where urine is stored).   A ureteral stent is a is a soft plastic tube with holes in tube that bypasses anything that obstructs (stone, tumor, scar tissue) the urine from flow from the kidney to the bladder. It is placed by going through the urethra and into the bladder. No incisions are made. Its temporarily inserted into a ureter to help drain urine into the bladder. One end goes in the kidney. The other end goes in the bladder. A coil on each end holds the stent in place. The stent cant be seen from outside the body.     Because you have a string attached to the stent and  has asked you to remove yourself, read the following carefully:  you can remove the stent __TUESDAY_ at 6am  it will be attached to the outside of your vagina (if you are a female)  or to the penis (if you are male) with tape  If the stent is partially or accidentally pulled out you will leak urine until the stent is removed because urine is now coming directly from your kidney and bypassing the urethra (the tube that drains the bladder). Go ahead and remove it if this happens but expect pain for 24 to 48 hours or more after stent removed, especially if it was removed earlier than expected.  If you have not heard from anyone about when to pull the string and remove the stent, remove it at two weeks after your surgery by pulling the string. DO NOT cut the string.   When you remove the string, you should see a small plastic tube about the size of a cocktail straw and about 12 inches long attached to a string that looks like dental floss. If you do not see this, please let call the office and let us know.   What to expect: Removing the stent yourself is not painful, only slightly uncomfortable. However, expect pain AFTER the stent is removed as the ureter closes up due to the inflammation.  left the stent to allow the inflammation in your ureter (the tube that drains the kidney to the bladder) from the procedure and stone to heal, which takes a few days, but the final process of healing comes after the stent is removed. As the ureter closes, the urine backs up and you will experience pain. This should last no more than 24 to 48 hours. There is a chance that a small stone could be trying to pass as well but usually this is just inflammation.   Prior to removing the stent, take a toradol and/or norco 30 minutes before removing the stent in anticipation of this. If the pain lasts >48 hours OR if you have fever, contact .    The goal of placing this stent on a string is to save you from returning from a procedure/cystoscopy where I remove the stent via a camera while you are awake and prevent you from having to return for a nurse visit to have removed. However, if you feel  that you cannot remove the string yourself, you can call our clinic and our nurse can pull it for you.   If you do not receive a follow-up date or further instructions on what is to be done next about the stent, it is your responsibility to make sure that you have follow-up to have your stone or stent removed.     A stent CANNOT remain indefinitely in the kidney. It should not remain if possible more than 3 to 6 months maximum (rarely 1 year if it was placed for cancer).     If you do not follow-up to have your stent removed then you can LOSE YOUR KIDNEY FUNCTION ON THAT SIDE, get RECURRENT URINARY TRACT INFECTIONS,and MORE STONES requiring SURGICAL OPEN removal of your kidney.    You can call our office ([facility] at [phone] and let them know a stent was placed and you need further instructions for follow-up). If there are issues with insurance we will work with you to help you arrange follow-up where it can be removed. Again,  A STENT CANNOT remain indefinitely. Y      A ureteral stent is left in place for many reasons:  to keep the ureter open after a procedure as there will be much inflammation and if no stent is left you will experience the same pain as having the stone that caused the obstruction.   to drain the kidney of infection.  if the stone is up high and closer to the kideny, if the stone is stuck, or you have an infection, the stent will stretch open the small ureter (the tube that drains the kidney) for a few weeks (usually 2 to 4 weeks max) so that we can return, remove the old stent, place instruments into the ureter or kidney to break up and remove the stone. Sometimes you may need another stent after this procedure.     Ureteral Stent Symptoms can include but are not limited to   Stabbing pain in the kidney or bladder with urination during or especially at the end of voiding. Usually we write you for flomax/tamsulosin and toradol/ketorolac to help stretch and relax the  ureter while you have the stent in place. We cannot write for toradol/ketorolac if you have poor kidney function. Please call if you have not received these prescriptions.  constant urge to urinate, frequency of urination, severe bladder spasms and severe pain the kidney, especially during urination. If this is very bothersome we can write you for ditropan, an overactive bladder medication to take short term to help with the spasms.   blood in the urine, especially with increased activity. This can last the entire time the stent is in, it can sometimes clear and return or you may never have any at all. I recommend increasing fluid intake to prevent large clots that may be difficult to urinate out.    Explanation for medications that may have been written for. See the instructions above to see what you were sent home with  Toradol/ketorolac 10mg (dispense 10)- this is a stronger form of ibuprofen, you can take up to every 8 hours but stop taking ibuprofen. Too much of this medicine can cause kidney failure or stomach ulcers. This helps with the inflammation the body is experiencing from the stent. Take this with food. If you have kidney disease then do not take this medication, even if it was written for you.  Tramadol-  take 1 every 4 to 6 hours for pain not relieved with ibuprofen or Tylenol. If you are taking this regularly you will need to take miralax or stool softeners daily to prevent constipation.   Flomax/Tamsulosin 0.4mg mg (dispense 30) - take once nightly before bedtime (at least 3 hours apart from other high blood pressure medicines) while the stent is in and for 1 week after stent removed to help relax the tube the stent is in. If you are taking amlodipine/norvasc for blood pressure, take at least four hours apart (preferably norvasc in morning and flomax/tamsulosin after dinner)  Antibiotics - antibiotics were given just before your procedure that will stay in your system for a while.  If you were  written for oral antibiotics, take twice daily. Your doctor will specify amount of days to take. If you have another scheduled procedure to have the stone removed more than 2 weeks after the initial procedure, she may ask you to save antibiotics and restart them 3 days before your next procedure.   AZO over the counter -  you can find this at [facility]. Choose one for bladder pain and anti-spasmodic. You can take it up to 3x a day for 3-4 days as long as you have normal kidney function. It may stain your urine and your clothes  Potassium citrate 15meq Twice a day to help increase dietary citrate and prevent stone formation on the stents - expect to see this in the stool, the coating contains the medication.   Ditropan 10mg/Oxybutynin 10mg XL, dispense 30 (take once daily as needed for bladder spasms- can cause constipation, take with stool softeners or fiber gummies)    When to go to ER:   fever >101.5 or inability to urinate (no urination for >4 hours and bladder pain) due to thick clots  If you go to the ER with pain, they may check to make sure the stent is in good position with an x-ray or ultrasound but unlikely to do anything else.   I will let you know when we will plan to have the stent either removed at the ambulatory surgical center as an outpatient procedure while you are awake, which is uncomfortable briefly, but not painful or you will remove it yourself by pulling the strings.    6 Easy Ways to Prevent Kidney Stones - please read!!!   Taken from: https://www.kidney.org/atoz/content/kidneystones_prevent    Don't Underestimate Your Sweat. Saunas, hot yoga and heavy exercise may be good for your health, but they also may lead to kidney stones. Why? Loss of water through sweating - whether due to these activities or just the heat of summer--leads to less urine production. The more you sweat, the less you urinate, which allows for stone-causing minerals to settle and bond in the kidneys and urinary  "tract.  Instead: Hydrate with H2O. One of the best measures you can take to avoid kidney stones is to drink plenty of water, leading you to urinate a lot. So, be sure to keep well hydrated, especially when engaging in exercise or activities that cause a lot of sweating.    It's Not Just the Oxalate. Oxa-what? Oxalate is naturally found in many foods, including fruits and vegetables, nuts and seeds, grains, legumes, and even chocolate and tea. Some examples of foods that contain high levels of oxalate include: peanuts, rhubarb, spinach, beets, chocolate and sweet potatoes. Moderating intake of these foods may be beneficial for people who form calcium oxalate stones, the leading type of kidney stones. A common misconception is that cutting the oxalate-rich foods in your diet alone will reduce the likelihood of forming calcium oxalate kidney stones. While in theory this might be true, this approach isn't smart from an overall health perspective. Most kidney stones are formed when oxalate binds to calcium while urine is produced by the kidneys.  Instead: Eat and drink calcium and oxalate-rich foods together during a meal. In doing so, oxalate and calcium are more likely to bind to one another in the stomach and intestines before the kidneys begin processing, making it less likely that kidney stones will form.    Calcium is Not the Enemy. But it tends to get a bad rap! Most likely due to its name and composition, many are under the impression that calcium is the main culprit in calcium-oxalate stones. "I still see patients who wonder why they are getting recurring stones despite cutting down on their calcium intake," said Dr. Fraga. "I've even had patients say that their doctors told them to reduce their calcium intake." A diet low in calcium actually increases one's risk of developing kidney stones.  Instead: Don't reduce the calcium. Work to cut back on the sodium in your diet and to pair calcium-rich foods with " "oxalate-rich foods.  It's Not One and Done. Passing a kidney stone is often described as one of the most painful experiences a person can have, but unfortunately, it's not always a one-time event. Studies have shown that having even one stone greatly increases your chances of having another. "Most people will want to do anything they can to ensure it doesn't happen again," said Dr. Fraga. "Unfortunately, it doesn't seem to be the case that people make the changes they need to after their first stone event." Research conducted by Dr. Fraga shows that those with kidney stones do not always heed the advice of their nephrologists and urinary specialists. About 15% of kidney stone patients didn't take prescribed medications and 41% did not follow the nutritional advice that would keep stones from recurring.  Instead: Take action! Without the right medications and diet adjustments, stones can come back, and recurring kidney stones also could be an indicator of other problems, including kidney disease.    When Life Hands You Kidney Stones don't fret. And as the saying goes, "make lemonade." It's important to consider dietary remedies alongside prescription medications. While it may seem easier to just take a pill to fix a medical problem, consider what lifestyle changes will also make a big impact on your health.  Instead: Next time you drive past a lemonade (or limeade) stand, consider your kidneys. Chronic kidney stones are often treated with potassium citrate, but studies have shown that limeade, lemonade and other fruits and juices high in natural citrate offers the same stone-preventing benefits. Beware of the sugar, though, because it can increase kidney stone risk.   Instead, buy sugar-free lemonade, or make your own by mixing lime or lemon juice with water and using a sugar substitute if needed. "We believe that citrate in the urine may prevent the calcium from binding with other constituents that lead to " "stones," said Dr. Fraga. "Also, some evidence suggests that citrate may prevent crystals that are already present from binding with each other, thus preventing them from getting bigger."    Not All Stones are Created Equal. In addition to calcium oxalate stones, another common type of kidney stones is uric acid stones. Red meat, organ meats, and shellfish have high concentrations of a natural chemical compound known as purines. "High purine intake leads to a higher production of uric acid and produces a larger acid load for the kidneys to excrete," said Dr. Fraga. Higher uric acid excretion leads to lower overall urine pH, which means the urine is more acidic. The high acid concentration of the urine makes it easier for uric acid stones to form.  Instead: To prevent uric acid stones, cut down on high-purine foods such as red meat, organ meats, and shellfish, and follow a healthy diet that contains mostly vegetables and fruits, whole grains, and low fat dairy products. Limit sugar-sweetened foods and drinks, especially those that contain high fructose corn syrup. Limit alcohol because it can increase uric acid levels in the blood and avoid crash diets for the same reason..Eating less animal-based protein and eating more fruits and vegetables will help decrease urine acidity and this will help reduce the chance for stone formation.    Plan for stent removal on strings:  The patient can remove the stent by pulling strings on TUESDAY at 6am/early morning (take pain medications prior to removing, expect pain after stent removed but call office or go to ER if pain lasts >48 hours and is severe. See discharge instructions for details.   The stone was sent for an analysis from today and we can send the results to the patient directly.   Cancel 11/10 appt.   Return to clinic to see me in 5-6 weeks to discuss to discuss the 24 hour urine results (help determine why patient makes stones) and CT (before end of year). Order " screening psa and 24 hour urine then. Can possibly schedule shock wave lithotripsy before end of year.      or continue the following medications from the pharmacy unless they have some remaining at home:  REFILLED Tramadol dispense 15 (take every 8 hours as needed for pain- can cause constipation, take with stool softeners or fiber gummies)  REFILLED Toradol/ketorolac 10mg, dispense 10 (take every 8 hours as needed for pain and alternate with norco, it's like a strong dose Ibuprofen so do not take Ibuprofen if taking this- take with food)  REFILLED AND CONTINUE Flomax/tamsulosin 0.4mg, dispense 90 - 1YEAR for bph  Continue antibiotics twice a day until completed.   AZO available over the counter (in the UTI section) to help with burning with urination and bladder spasms after the procedure. Take one pill/capsule 2 to 3x as a day as needed.   CONTINUE Potassium citrate 15meq Twice a day to help increase dietary citrate and prevent stone formation on the stents - expect to see this in the stool, the coating contains the medication.

## 2022-11-02 NOTE — ANESTHESIA PROCEDURE NOTES
Intubation    Date/Time: 11/2/2022 7:49 AM  Performed by: Aruna Li CRNA  Authorized by: Aruna Li CRNA     Intubation:     Induction:  Intravenous    Intubated:  Postinduction    Mask Ventilation:  Easy mask    Attempted By:  CRNA    Difficult Airway Encountered?: No      Complications:  None    Airway Device:  Supraglottic airway/LMA    Airway Device Size:  4.0    Style/Cuff Inflation:  Cuffed (inflated to minimal occlusive pressure)    Secured at:  The lips    Placement Verified By:  Capnometry    Complicating Factors:  Obesity    Findings Post-Intubation:  BS equal bilateral and atraumatic/condition of teeth unchanged

## 2022-11-02 NOTE — PLAN OF CARE
Preop complete. Wife at bedside. Text notifications initiated. Pt denies need for safe. Belongings in locker.

## 2022-11-02 NOTE — ANESTHESIA PREPROCEDURE EVALUATION
11/02/2022  Tato Weaver is a 57 y.o., male.      Pre-op Assessment    I have reviewed the NPO Status.   I have reviewed the Medications.     Review of Systems  Anesthesia Hx:  No problems with previous Anesthesia    Social:  Non-Smoker    Cardiovascular:   Exercise tolerance: good Hypertension, well controlled    Pulmonary:  Pulmonary Normal    Renal/:   renal calculi    Hepatic/GI:  Hepatic/GI Normal    Neurological:  Neurology Normal    Endocrine:  Obesity / BMI > 30      Physical Exam  General: Well nourished    Airway:  Mallampati: III / II  Mouth Opening: Normal  TM Distance: Normal  Tongue: Normal  Neck ROM: Normal ROM    Dental:  Partial Dentures        Anesthesia Plan  Type of Anesthesia, risks & benefits discussed:    Anesthesia Type: Gen Natural Airway  Intra-op Monitoring Plan: Standard ASA Monitors  Induction:  IV  Informed Consent: Informed consent signed with the Patient and all parties understand the risks and agree with anesthesia plan.  All questions answered.   ASA Score: 2    Ready For Surgery From Anesthesia Perspective.     .

## 2022-11-02 NOTE — DISCHARGE INSTRUCTIONS
Plan for stent removal on strings:  The patient can remove the stent by pulling strings on TUESDAY at 6am/early morning (take pain medications prior to removing, expect pain after stent removed but call office or go to ER if pain lasts >48 hours and is severe. See discharge instructions for details.   The stone was sent for an analysis from today and we can send the results to the patient directly.   Cancel 11/10 appt.   Return to clinic to see me in 5-6 weeks to discuss to discuss the 24 hour urine results (help determine why patient makes stones) and CT (before end of year). Order screening psa and 24 hour urine then. Can possibly schedule shock wave lithotripsy before end of year.       or continue the following medications from the pharmacy unless they have some remaining at home:  REFILLED Tramadol dispense 15 (take every 8 hours as needed for pain- can cause constipation, take with stool softeners or fiber gummies)  REFILLED Toradol/ketorolac 10mg, dispense 10 (take every 8 hours as needed for pain and alternate with norco, it's like a strong dose Ibuprofen so do not take Ibuprofen if taking this- take with food)  REFILLED AND CONTINUE Flomax/tamsulosin 0.4mg, dispense 90 - 1YEAR for bph  Continue antibiotics twice a day until completed.   AZO available over the counter (in the UTI section) to help with burning with urination and bladder spasms after the procedure. Take one pill/capsule 2 to 3x as a day as needed.   CONTINUE Potassium citrate 15meq Twice a day to help increase dietary citrate and prevent stone formation on the stents - expect to see this in the stool, the coating contains the medication.        Discharge Instructions: After Your Surgery/Procedure  Youve just had surgery. During surgery you were given medicine called anesthesia to keep you relaxed and free of pain. After surgery you may have some pain or nausea. This is common. Here are some tips for feeling better and getting well  "after surgery.     Stay on schedule with your medication.   Going home  Your doctor or nurse will show you how to take care of yourself when you go home. He or she will also answer your questions. Have an adult family member or friend drive you home.      For your safety we recommend these precaution for the first 24 hours after your procedure:  Do not drive or use heavy equipment.  Do not make important decisions or sign legal papers.  Do not drink alcohol.  Have someone stay with you, if needed. He or she can watch for problems and help keep you safe.  Your concentration, balance, coordination, and judgement may be impaired for many hours after anesthesia.  Use caution when ambulating or standing up.     You may feel weak and "washed out" after anesthesia and surgery.      Subtle residual effects of general anesthesia or sedation with regional / local anesthesia can last more than 24 hours.  Rest for the remainder of the day or longer if your Doctor/Surgeon has advised you to do so.  Although you may feel normal within the first 24 hours, your reflexes and mental ability may be impaired without you realizing it.  You may feel dizzy, lightheaded or sleepy for 24 hours or longer.      Be sure to go to all follow-up visits with your doctor. And rest after your surgery for as long as your doctor tells you to.  Coping with pain  If you have pain after surgery, pain medicine will help you feel better. Take it as told, before pain becomes severe. Also, ask your doctor or pharmacist about other ways to control pain. This might be with heat, ice, or relaxation. And follow any other instructions your surgeon or nurse gives you.  Tips for taking pain medicine  To get the best relief possible, remember these points:  Pain medicines can upset your stomach. Taking them with a little food may help.  Most pain relievers taken by mouth need at least 20 to 30 minutes to start to work.  Taking medicine on a schedule can help you " remember to take it. Try to time your medicine so that you can take it before starting an activity. This might be before you get dressed, go for a walk, or sit down for dinner.  Constipation is a common side effect of pain medicines. Call your doctor before taking any medicines such as laxatives or stool softeners to help ease constipation. Also ask if you should skip any foods. Drinking lots of fluids and eating foods such as fruits and vegetables that are high in fiber can also help. Remember, do not take laxatives unless your surgeon has prescribed them.  Drinking alcohol and taking pain medicine can cause dizziness and slow your breathing. It can even be deadly. Do not drink alcohol while taking pain medicine.  Pain medicine can make you react more slowly to things. Do not drive or run machinery while taking pain medicine.  Your health care provider may tell you to take acetaminophen to help ease your pain. Ask him or her how much you are supposed to take each day. Acetaminophen or other pain relievers may interact with your prescription medicines or other over-the-counter (OTC) drugs. Some prescription medicines have acetaminophen and other ingredients. Using both prescription and OTC acetaminophen for pain can cause you to overdose. Read the labels on your OTC medicines with care. This will help you to clearly know the list of ingredients, how much to take, and any warnings. It may also help you not take too much acetaminophen. If you have questions or do not understand the information, ask your pharmacist or health care provider to explain it to you before you take the OTC medicine.  Managing nausea  Some people have an upset stomach after surgery. This is often because of anesthesia, pain, or pain medicine, or the stress of surgery. These tips will help you handle nausea and eat healthy foods as you get better. If you were on a special food plan before surgery, ask your doctor if you should follow it while  you get better. These tips may help:  Do not push yourself to eat. Your body will tell you when to eat and how much.  Start off with clear liquids and soup. They are easier to digest.  Next try semi-solid foods, such as mashed potatoes, applesauce, and gelatin, as you feel ready.  Slowly move to solid foods. Dont eat fatty, rich, or spicy foods at first.  Do not force yourself to have 3 large meals a day. Instead eat smaller amounts more often.  Take pain medicines with a small amount of solid food, such as crackers or toast, to avoid nausea.     Call your surgeon if  You still have pain an hour after taking medicine. The medicine may not be strong enough.  You feel too sleepy, dizzy, or groggy. The medicine may be too strong.  You have side effects like nausea, vomiting, or skin changes, such as rash, itching, or hives.       If you have obstructive sleep apnea  You were given anesthesia medicine during surgery to keep you comfortable and free of pain. After surgery, you may have more apnea spells because of this medicine and other medicines you were given. The spells may last longer than usual.   At home:  Keep using the continuous positive airway pressure (CPAP) device when you sleep. Unless your health care provider tells you not to, use it when you sleep, day or night. CPAP is a common device used to treat obstructive sleep apnea.  Talk with your provider before taking any pain medicine, muscle relaxants, or sedatives. Your provider will tell you about the possible dangers of taking these medicines.  © 5656-9078 [employer]. [address] [place] [address], [place] [address]. All rights reserved. This information is not intended as a substitute for professional medical care. Always follow your healthcare professional's instructions.   Post op instructions for prevention of DVT  What is deep vein thrombosis?  Deep vein thrombosis (DVT) is the medical term for blood clots in the deep veins of the leg.   These blood clots can be dangerous.  A DVT can block a blood vessel and keep blood from getting where it needs to go.  Another problem is that the clot can travel to other parts of the body such as the lungs.  A clot that travels to the lungs is called a pulmonary embolus (PE) and can cause serious problems with breathing which can lead to death.  Am I at risk for DVT/PE?  If you are not very active, you are at risk of DVT.  Anyone confined to bed, sitting for long periods of time, recovering from surgery, etc. increases the risk of DVT.  Other risk factors are cancer diagnosis, certain medications, estrogen replacement in any form,older age, obesity, pregnancy, smoking, history of clotting disorders, and dehydration.  How will I know if I have a DVT?  Swelling in the lower leg  Pain  Warmth, redness, hardness or bulging of the vein  If you have any of these symptoms, call your doctors office right away.  Some people will not have any symptoms until the clot moves to the lungs.  What are the symptoms of a PE?  Panting, shortness of breath, or trouble breathing  Sharp, knife-like chest pain when you breathe  Coughing or coughing up blood  Rapid heartbeat  If you have any of these symptoms or get worse quickly, call 911 for emergency treatment.  How can I prevent a DVT?  Avoid long periods of inactivity and dont cross your legs--get up and walk around every hour or so.  Stay active--walking after surgery is highly encouraged.  This means you should get out of the house and walk in the neighborhood.  Going up and down stairs will not impair healing (unless advised against such activity by your doctor).    Drink plenty of noncaffeinated, nonalcoholic fluids each day to prevent dehydration.  Wear special support stockings, if they have been advised by your doctor.  If you travel, stop at least once an hour and walk around.  Avoid smoking (assistance with stopping is available through your healthcare provider)  Always  notify your doctor if you are not able to follow the post operative instructions that are given to you at the time of discharge.  It may be necessary to prescribe one of the medications available to prevent DVT.     Using an Incentive Spirometer    An incentive spirometer is a device that helps you do deep breathing exercises. These exercises expand your lungs, aid in circulation, and help prevent pneumonia. Deep breathing exercises also help you breathe better and improve the function of your lungs by:  Keeping your lungs clear  Strengthening your breathing muscles  Helping prevent respiratory complications or problems  The incentive spirometer gives you a way to take an active part in recover. A nurse or therapist will teach you breathing exercises. To do these exercises, you will breathe in through your mouth and not your nose. The incentive spirometer only works correctly if you breathe in through your mouth.  Steps to clear lungs  Step 1. Exhale normally. Then, inhale normally.  Relax and breathe out.  Step 2. Place your lips tightly around the mouthpiece.  Make sure the device is upright and not tilted.  Step 3. Inhale as much air as you can through the mouthpiece (don't breath through your nose).  Inhale slowly and deeply.  Hold your breath long enough to keep the balls or disk raised for at least 3 to 5 seconds, or as instructed by your healthcare provider.  Some spirometers have an indicator to let you know that you are breathing in too fast. If the indicator goes off, breathe in more slowly.  Step 4. Repeat the exercise regularly.  Do this exercise every hour while you're awake, or as instructed by your healthcare provider.  If you were taught deep breathing and coughing exercises, do them regularly as instructed by your healthcare provider.

## 2022-11-02 NOTE — TRANSFER OF CARE
"Anesthesia Transfer of Care Note    Patient: Tato Weaver    Procedure(s) Performed: Procedure(s) (LRB):  LITHOTRIPSY, USING LASER (Left)  REMOVAL, CALCULUS, URETER, URETEROSCOPIC (Left)  CYSTOSCOPY, WITH RETROGRADE PYELOGRAM AND URETERAL STENT INSERTION (Left)    Patient location: PACU    Anesthesia Type: general    Transport from OR: Transported from OR on 6-10 L/min O2 by face mask with adequate spontaneous ventilation    Post pain: adequate analgesia    Post assessment: no apparent anesthetic complications    Post vital signs: stable    Level of consciousness: sedated    Nausea/Vomiting: no nausea/vomiting    Complications: none    Transfer of care protocol was followed      Last vitals:   Visit Vitals  BP (!) 151/89 (BP Location: Left arm, Patient Position: Lying)   Pulse (!) 53   Temp 36.6 °C (97.8 °F)   Resp 18   Ht 5' 11" (1.803 m)   Wt 113.4 kg (250 lb)   BMI 34.87 kg/m²     "

## 2022-11-03 VITALS
RESPIRATION RATE: 20 BRPM | DIASTOLIC BLOOD PRESSURE: 64 MMHG | HEIGHT: 71 IN | SYSTOLIC BLOOD PRESSURE: 130 MMHG | OXYGEN SATURATION: 98 % | HEART RATE: 69 BPM | BODY MASS INDEX: 35 KG/M2 | WEIGHT: 250 LBS | TEMPERATURE: 98 F

## 2022-11-03 NOTE — ANESTHESIA POSTPROCEDURE EVALUATION
Anesthesia Post Evaluation    Patient: Tato Weaver    Procedure(s) Performed: Procedure(s) (LRB):  LITHOTRIPSY, USING LASER (Left)  REMOVAL, CALCULUS, URETER, URETEROSCOPIC (Left)  CYSTOSCOPY, WITH RETROGRADE PYELOGRAM AND URETERAL STENT INSERTION (Left)    Final Anesthesia Type: general      Patient location during evaluation: PACU  Patient participation: Yes- Able to Participate  Level of consciousness: awake and alert and oriented  Post-procedure vital signs: reviewed and stable  Pain management: adequate  Airway patency: patent    PONV status at discharge: No PONV  Anesthetic complications: no      Cardiovascular status: blood pressure returned to baseline  Respiratory status: unassisted, spontaneous ventilation and room air  Hydration status: euvolemic  Follow-up not needed.          Vitals Value Taken Time   /64 11/02/22 1145   Temp 36.7 °C (98 °F) 11/02/22 1145   Pulse 69 11/02/22 1145   Resp 20 11/02/22 1145   SpO2 98 % 11/02/22 1145         Event Time   Out of Recovery 11:01:00         Pain/Brittanie Score: Pain Rating Prior to Med Admin: 6 (11/2/2022 10:45 AM)  Pain Rating Post Med Admin: 2 (11/2/2022 11:00 AM)  Brittanie Score: 10 (11/2/2022 10:45 AM)  Modified Brittanie Score: 20 (11/2/2022 11:40 AM)

## 2022-11-07 LAB
COMPN STONE: NORMAL
SPECIMEN SOURCE: NORMAL
STONE ANALYSIS IR-IMP: NORMAL

## 2022-11-14 ENCOUNTER — PATIENT MESSAGE (OUTPATIENT)
Dept: UROLOGY | Facility: CLINIC | Age: 57
End: 2022-11-14
Payer: COMMERCIAL

## 2022-12-01 ENCOUNTER — HOSPITAL ENCOUNTER (OUTPATIENT)
Dept: RADIOLOGY | Facility: HOSPITAL | Age: 57
Discharge: HOME | End: 2022-12-01
Attending: UROLOGY
Payer: COMMERCIAL

## 2022-12-01 DIAGNOSIS — N20.0 NEPHROLITHIASIS: ICD-10-CM

## 2022-12-01 PROCEDURE — 74176 CT ABD & PELVIS W/O CONTRAST: CPT | Mod: TC

## 2022-12-01 PROCEDURE — 74176 CT ABD & PELVIS W/O CONTRAST: CPT | Mod: 26,,, | Performed by: RADIOLOGY

## 2022-12-01 PROCEDURE — 74176 CT RENAL STONE STUDY ABD PELVIS WO: ICD-10-PCS | Mod: 26,,, | Performed by: RADIOLOGY

## 2022-12-05 ENCOUNTER — TELEPHONE (OUTPATIENT)
Dept: UROLOGY | Facility: CLINIC | Age: 57
End: 2022-12-05
Payer: COMMERCIAL

## 2022-12-05 ENCOUNTER — PATIENT MESSAGE (OUTPATIENT)
Dept: UROLOGY | Facility: CLINIC | Age: 57
End: 2022-12-05
Payer: COMMERCIAL

## 2022-12-05 DIAGNOSIS — R82.998 CELLS AND CASTS IN URINE: Primary | ICD-10-CM

## 2022-12-05 DIAGNOSIS — N20.0 NEPHROLITHIASIS: Primary | ICD-10-CM

## 2022-12-05 RX ORDER — CIPROFLOXACIN 2 MG/ML
400 INJECTION, SOLUTION INTRAVENOUS
Status: CANCELLED | OUTPATIENT
Start: 2022-12-05

## 2022-12-05 NOTE — TELEPHONE ENCOUNTER
Scheduling for wed   Likely having trouble due to distal stones.   Still has some remaining L uretetera and renal stones  Scheduling for stone extraction this wed   Can he do urine tomorrow?  Otherwise can do urine morning of procedure wed

## 2022-12-05 NOTE — TELEPHONE ENCOUNTER
----- Message from Jessica Gayle LPN sent at 12/5/2022  3:55 PM CST -----  Spoke with patient informed him of results and recommendations. Verbally voiced understanding. Patient not having pain but is having issues urinating. Patient states if he has to have a procedure needs to be done before the end of the year.

## 2022-12-05 NOTE — PROGRESS NOTES
Let pt know his ct shows he still has stones on the left side and some of them have actually moved into his ureter. Is he having any pain? He needs to make ssure to come to his appt scheduled for 12/8 so we can discuss a plan for his stones. In the meantime he should increase his flomax to 2 pills at night and strain all his urine. He has 2 stones in ureter.     Let me know If you do not reach him  Very important he gets this message   Confirm he has been spoken to or responded to a message regarding theses results

## 2022-12-06 ENCOUNTER — TELEPHONE (OUTPATIENT)
Dept: UROLOGY | Facility: CLINIC | Age: 57
End: 2022-12-06
Payer: COMMERCIAL

## 2022-12-06 ENCOUNTER — LAB VISIT (OUTPATIENT)
Dept: LAB | Facility: HOSPITAL | Age: 57
End: 2022-12-06
Attending: UROLOGY
Payer: COMMERCIAL

## 2022-12-06 DIAGNOSIS — R82.998 CELLS AND CASTS IN URINE: ICD-10-CM

## 2022-12-06 LAB
BILIRUB UR QL STRIP: NEGATIVE
CLARITY UR: CLEAR
COLOR UR: YELLOW
GLUCOSE UR QL STRIP: NEGATIVE
HGB UR QL STRIP: NEGATIVE
KETONES UR QL STRIP: NEGATIVE
LEUKOCYTE ESTERASE UR QL STRIP: NEGATIVE
NITRITE UR QL STRIP: NEGATIVE
PH UR STRIP: 7 [PH] (ref 5–8)
PROT UR QL STRIP: NEGATIVE
SP GR UR STRIP: 1.02 (ref 1–1.03)
URN SPEC COLLECT METH UR: NORMAL
UROBILINOGEN UR STRIP-ACNC: 1 EU/DL

## 2022-12-06 PROCEDURE — 87086 URINE CULTURE/COLONY COUNT: CPT | Performed by: UROLOGY

## 2022-12-06 PROCEDURE — 81003 URINALYSIS AUTO W/O SCOPE: CPT | Performed by: UROLOGY

## 2022-12-06 NOTE — TELEPHONE ENCOUNTER
Spoke with patient per patient procedure rescheduled to Wednesday 12/14. OR informed. Patient will give urine at the lab today or tomorrow.

## 2022-12-06 NOTE — TELEPHONE ENCOUNTER
----- Message from Viky De Oliveira sent at 12/6/2022  9:23 AM CST -----  Contact: Patient  Type: Patient Call Back         Who called: Patient          What is the request in detail: requesting a callback to discussed resched procedure on 12/14; needs to  confirm if he still needs to come in tmrw;  please advise         Best call back number: [phone]         Additional Information:            Thank You

## 2022-12-07 NOTE — TELEPHONE ENCOUNTER
Spoke with patient offered virtual visit instead for tomorrow. Patient states he will speak with his wife and give the office a call back.

## 2022-12-08 ENCOUNTER — OFFICE VISIT (OUTPATIENT)
Dept: UROLOGY | Facility: CLINIC | Age: 57
End: 2022-12-08
Payer: COMMERCIAL

## 2022-12-08 VITALS
HEART RATE: 59 BPM | WEIGHT: 247 LBS | BODY MASS INDEX: 34.58 KG/M2 | HEIGHT: 71 IN | SYSTOLIC BLOOD PRESSURE: 149 MMHG | DIASTOLIC BLOOD PRESSURE: 98 MMHG

## 2022-12-08 DIAGNOSIS — Z80.42 FAMILY HISTORY OF PROSTATE CANCER: ICD-10-CM

## 2022-12-08 DIAGNOSIS — N20.0 NEPHROLITHIASIS: Primary | ICD-10-CM

## 2022-12-08 LAB — BACTERIA UR CULT: NO GROWTH

## 2022-12-08 PROCEDURE — 3008F PR BODY MASS INDEX (BMI) DOCUMENTED: ICD-10-PCS | Mod: CPTII,S$GLB,, | Performed by: UROLOGY

## 2022-12-08 PROCEDURE — 3008F BODY MASS INDEX DOCD: CPT | Mod: CPTII,S$GLB,, | Performed by: UROLOGY

## 2022-12-08 PROCEDURE — 99999 PR PBB SHADOW E&M-EST. PATIENT-LVL III: ICD-10-PCS | Mod: PBBFAC,,, | Performed by: UROLOGY

## 2022-12-08 PROCEDURE — 3080F PR MOST RECENT DIASTOLIC BLOOD PRESSURE >= 90 MM HG: ICD-10-PCS | Mod: CPTII,S$GLB,, | Performed by: UROLOGY

## 2022-12-08 PROCEDURE — 1159F PR MEDICATION LIST DOCUMENTED IN MEDICAL RECORD: ICD-10-PCS | Mod: CPTII,S$GLB,, | Performed by: UROLOGY

## 2022-12-08 PROCEDURE — 99999 PR PBB SHADOW E&M-EST. PATIENT-LVL III: CPT | Mod: PBBFAC,,, | Performed by: UROLOGY

## 2022-12-08 PROCEDURE — 99214 OFFICE O/P EST MOD 30 MIN: CPT | Mod: S$GLB,,, | Performed by: UROLOGY

## 2022-12-08 PROCEDURE — 99214 PR OFFICE/OUTPT VISIT, EST, LEVL IV, 30-39 MIN: ICD-10-PCS | Mod: S$GLB,,, | Performed by: UROLOGY

## 2022-12-08 PROCEDURE — 4010F ACE/ARB THERAPY RXD/TAKEN: CPT | Mod: CPTII,S$GLB,, | Performed by: UROLOGY

## 2022-12-08 PROCEDURE — 3080F DIAST BP >= 90 MM HG: CPT | Mod: CPTII,S$GLB,, | Performed by: UROLOGY

## 2022-12-08 PROCEDURE — 1159F MED LIST DOCD IN RCRD: CPT | Mod: CPTII,S$GLB,, | Performed by: UROLOGY

## 2022-12-08 PROCEDURE — 4010F PR ACE/ARB THEARPY RXD/TAKEN: ICD-10-PCS | Mod: CPTII,S$GLB,, | Performed by: UROLOGY

## 2022-12-08 PROCEDURE — 1160F RVW MEDS BY RX/DR IN RCRD: CPT | Mod: CPTII,S$GLB,, | Performed by: UROLOGY

## 2022-12-08 PROCEDURE — 1160F PR REVIEW ALL MEDS BY PRESCRIBER/CLIN PHARMACIST DOCUMENTED: ICD-10-PCS | Mod: CPTII,S$GLB,, | Performed by: UROLOGY

## 2022-12-08 PROCEDURE — 3077F PR MOST RECENT SYSTOLIC BLOOD PRESSURE >= 140 MM HG: ICD-10-PCS | Mod: CPTII,S$GLB,, | Performed by: UROLOGY

## 2022-12-08 PROCEDURE — 3077F SYST BP >= 140 MM HG: CPT | Mod: CPTII,S$GLB,, | Performed by: UROLOGY

## 2022-12-08 RX ORDER — KETOROLAC TROMETHAMINE 10 MG/1
10 TABLET, FILM COATED ORAL EVERY 8 HOURS PRN
Qty: 10 TABLET | Refills: 0 | Status: SHIPPED | OUTPATIENT
Start: 2022-12-08

## 2022-12-08 RX ORDER — HYDROCODONE BITARTRATE AND ACETAMINOPHEN 10; 325 MG/1; MG/1
1 TABLET ORAL EVERY 6 HOURS PRN
Qty: 10 TABLET | Refills: 0 | Status: SHIPPED | OUTPATIENT
Start: 2022-12-08

## 2022-12-08 NOTE — PROGRESS NOTES
[facility] [place]  PCP: Nixon Denny MD  Referred by: No ref. provider found  DOS: 12/8/2022    CC: stone      Subjective:      Initial consult by me in CLINIC for kidney stone  on 10/11/22    Tato Weaver is a 57 y.o. male     Patient presented to [facility] ER yesterday with urinary hesitancy, acute left flank pain radiating to groin.  He has a history of stones.  Urinalysis showed only.  No fevers or chills at home.  creatinine 1.3, baseline 0.8/0.9 but this was in 2010.   Blood cell count normal.    Ctrss showed multiple b stones and a Left 6mm uvj stone with mild hydro.  Left kidney with about 2 cm stone burden, CT without dye.  So could have some dilated calices needs further evaluation.  He was sent home with Flomax for trial of passage since it was only 1 cm within the bladder.  Plan was for him to follow up for possible procedure next week.  However  overnight continued to have difficulty urinating.     Here today to discuss possible stone treatment.  Taking Norco around the clock.  Pain was 10 out 10 yesterday.  Pain has been controlled with the Norco today.          Previous stone episode(s) and workup:  Started having stones about 25 years ago.  Then did well until about 8 years ago.  Then required multiple shockwave procedures bilaterally by urologist in [place].  Had a stent placed was told he was cleared of stones.  Does not sound like he had a stone workup.  Stone sounded like they were calcium oxalate.   Family history:No  Any other stone risk factors:none  Anticoagulation: No  The patient does not have any other urologic issues.  Last PSA 2010 0.94.  Two paternal uncles with prostate cancer.    11/2/22 cysto L urs and stone extraction. Mod bph. L uo minimal irritation.  Large stone in upper pole obstructing the upper pole.  Very inflamed around this.  Hard stone with soft stone around it.  Able to dust most of it and then fragment the rest and removed most of  stone fragments.      Interval history 12/8/22:  Had him do a f/u ctrss 12/1/22 and it showed 2 5mm distal ureteral stones with proximal hydro and still had decent stone burden in kidney. 3 right renal stones all less than 5mm. Still has 5-6 stones in left.  Psa 12/1 was 2.6 (has distal stones).   Taking k cit twice a day. 24 hour urine shows 554 citrate on citrate. Other abnormal findings: 0.96L.  Taking flomax 0.8 nightly. Maybe helping urination. Having some trickling when he sits.   Ua yesterday was negative. Had some pain last night. Not taking any toradol.                             Urine history: family history of kidney, bladder or prostate cancer:Yes - prostate, personal or family history of kidney stones: no - ,tobacco use: yes-quit 2008, anticoagulation: No  12/6/22 Neg   10/11/22 Tr leuk/small blood  10/10/22 Tr prot  7/31/10 1+leuk, 26 wbc       PSA History: + fam hx of prostate cancer- 2 paternal uncles  12/8/22 LUCY: 20g no nodules but can only palpate tip  12/1/22 2.6  2010  0.94    Current REVIEW OF SYSTEMS: Negative for the remaining 12 points of ROS except for as stated above    Past Medical History:   Diagnosis Date    Essential (primary) hypertension     Thyroid disease        Fam Hx: Reviewed- pertinent family hx as above    Allergies:  Lisinopril    Objective:     Vitals:    12/08/22 1310   BP: (!) 149/98   Pulse: (!) 59       General:wdwn  in NAD  Neurologic: CN grossly normal. Normal sensation.   Psychiatric: awake, alert and oriented x 3. Mood and affect Normal. Cooperative.  Eyes: PERRLA, normal conjunctiva  Respiratory: no increased work on breathing. No wheezing.   Cardiovascular: No obvious extremity edema. Warm and well perfused.  GI: no obvious stomach distension  Musculoskeletal: normal  range of motion of bilateral upper extremities. Normal muscle strength and tone.  Skin: no obvious rashes or lesions. No tightening of skin noted.    Pertinent  exam in HPI      LABS  REVIEW:  Recent labs:   Recent Labs   Lab 10/10/22  1156 10/19/22  1030   WBC 5.07 5.90   Hemoglobin 14.6 15.7   Hematocrit 43.1 45.2   Platelets 198 217   ]  Recent Labs   Lab 10/10/22  1156 10/19/22  1030   Sodium 141 141  141   Potassium 3.9 4.1  4.1   Chloride 103 103  103   CO2 29 27  27   BUN 18 20  20   Creatinine 1.3 1.2  1.2   Glucose 85 96  96   Calcium 9.2 9.4  9.4   Alkaline Phosphatase 52 L  --    Total Protein 7.2  --    Albumin 4.2  --    Total Bilirubin 0.7  --    AST 26  --    ALT 46 H  --    ]      Assessment:     Tato Weaver is a 57 y.o. male with   1. Nephrolithiasis    2. Family history of prostate cancer        Left uvj stone, multiple large left renal stones, r renal stones despite recent stone extaction    Plan:     Short plan:   To OR next   wednesday for repeat cystoscopy, left ureteroscopy and stone extraction. Repeat LUCY while asleep.   Continue K cit BID amd ;,pm omtale   Continue flomax 0.8mg for now but change to 0.4 eventually  Norco 10 as needed for pain, alternate with toradol/ketorolac. No naprosyn with this.   ER if fever or chills  Drink MORE WATER. 3x as much.   Repeat psa in 6 months to establish stability of psa     Special considerations:  Need to monitor stones on right  Repeat psa in 6 months    See below for details:   The spontaneous passage rate in 20 weeks was 312 out of 392 stones, 98% in 0-2 mm, 98% in 3 mm, 81% in 4 mm, 65% in 5 mm, 33% in 6 mm and 9% in ?6.5 mm wide stones.  The stone size and location predicted spontaneous ureteric stone passage. The side and the grade of hydronephrosis only predicted stone passage in specific subgroups    The patient was counseled to go to ER before this if they have fever, chills or pain not able to controlled with pain medication.     The patient has been scheduled for: cystoscopy with left stent placement and possible ureteroscopy and stone extraction     If stent/ureteroscopy planned:  If infection/pus is  seen proximal/behind the stone after placement of wire, then the pt will only have a ureteral stent and a catheter placed to maximally drain infection. They will be admitted for IV abx and the gupta will remain until patient's white blood cell count is normal/lower and they do not demonstrate any fevers for 24 hours.   then we will return in 2 to 4 weeks to do a stone extraction with or without laser once infection has been cleared. At that procedure the old stent will be removed and the stone/s will be fragmented and removed and a new ureteral stent replaced but likely on strings so the patient can remove themselves at a designated time so as to avoid another procedure to have the stent removed    If no infection is seen, will attempt to reach stone, laser/fragment it if necessary and then basket out the fragments.  A stent will be placed likely on strings to be removed at the designated time by the patient so as not to have to need another procedure to remove the stent.  it was explained to the patient that it is very easy to remove the stent and does not painful to the remove the stent but in fact the pain usually occurs within a few hours after the stent is removed and is managed with pain medicine.    TYPICAL UROLOGIC MEDICATIONS USED IN PATIENTS WITH STONES  Toradol/ketorolac 10mg (dispense 10)- this is a stronger form of ibuprofen, you can take up to every 8 hours but stop taking ibuprofen. Too much of this medicine can cause kidney failure or stomach ulcers. This helps with the inflammation the body is experiencing from the stent. Take this with food. If you have kidney disease then do not take this medication, even if it was written for you.  Norco/Hydrocodone 5mg (dispense 15) or Tramadol-  take 1 every 4 to 6 hours for pain not relieved with ibuprofen or Tylenol. If you are taking this regularly you will need to take miralax or stool softeners daily to prevent constipation.   Flomax/Tamsulosin 0.4mg mg  (dispense 30) - take once nightly before bedtime (at least 3 hours apart from other high blood pressure medicines) either while trying to pass stones or while the stent is in and for 1 week after stent removed to help relax the tube the stent is in. If you are taking amlodipine/norvasc for blood pressure, take at least four hours apart (preferably norvasc in morning and flomax/tamsulosin after dinner)  Antibiotics - antibiotics were given just before your procedure that will stay in your system for a while.  If you were written for oral antibiotics, take twice daily. Your doctor will specify amount of days to take. If you have another scheduled procedure to have the stone removed more than 2 weeks after the initial procedure, she may ask you to save antibiotics and restart them 3 days before your next procedure.   AZO over the counter -  you can find this at [facility]. Choose one for bladder pain and anti-spasmodic. You can take it up to 3x a day for 3-4 days as long as you have normal kidney function. It may stain your urine and your clothes  Potassium citrate 15meq Twice a day to help increase dietary citrate and prevent stone formation on the stents - expect to see this in the stool, the coating contains the medication.   Ditropan 10mg/Oxybutynin 10mg XL, dispense 30 (take once daily as needed for bladder spasms- can cause constipation, take with stool softeners or fiber gummies)      A ureteral stent is left in place for many reasons:  to keep the ureter open after a procedure as there will be much inflammation and if no stent is left you will experience the same pain as having the stone that caused the obstruction.   to drain the kidney of infection.  if the stone is up high and closer to the kideny, if the stone is stuck, or you have an infection, the stent will stretch open the small ureter (the tube that drains the kidney) for a few weeks (usually 2 to 4 weeks max) so that we can return, remove the old stent,  place instruments into the ureter or kidney to break up and remove the stone. Sometimes you may need another stent after this procedure.     Ureteral Stent Symptoms can include but are not limited to   Stabbing pain in the kidney or bladder with urination during or especially at the end of voiding. Usually we write you for flomax/tamsulosin and toradol/ketorolac to help stretch and relax the ureter while you have the stent in place. We cannot write for toradol/ketorolac if you have poor kidney function. Please call if you have not received these prescriptions.  constant urge to urinate, frequency of urination, severe bladder spasms and severe pain the kidney, especially during urination. If this is very bothersome we can write you for ditropan, an overactive bladder medication to take short term to help with the spasms.   blood in the urine, especially with increased activity. This can last the entire time the stent is in, it can sometimes clear and return or you may never have any at all. I recommend increasing fluid intake to prevent large clots that may be difficult to urinate out.                              Dayana An MD

## 2022-12-08 NOTE — PRE-PROCEDURE INSTRUCTIONS
Called patient to go over pre-op instructions, no answer.  Left voicemail with instructions and number to call back with questions.  Also sent through [facility].

## 2022-12-13 ENCOUNTER — ANESTHESIA EVENT (OUTPATIENT)
Dept: SURGERY | Facility: HOSPITAL | Age: 57
End: 2022-12-13
Payer: COMMERCIAL

## 2022-12-13 NOTE — H&P
[facility] [place]  PCP: Nixon Denny MD  Referred by: No ref. provider found  DOS: 12/13/2022    CC: stone      Subjective:      Initial consult by me in CLINIC for kidney stone  on 10/11/22    Tato Weaver is a 57 y.o. male     Patient presented to [facility] ER yesterday with urinary hesitancy, acute left flank pain radiating to groin.  He has a history of stones.  Urinalysis showed only.  No fevers or chills at home.  creatinine 1.3, baseline 0.8/0.9 but this was in 2010.   Blood cell count normal.    Ctrss showed multiple b stones and a Left 6mm uvj stone with mild hydro.  Left kidney with about 2 cm stone burden, CT without dye.  So could have some dilated calices needs further evaluation.  He was sent home with Flomax for trial of passage since it was only 1 cm within the bladder.  Plan was for him to follow up for possible procedure next week.  However  overnight continued to have difficulty urinating.     Here today to discuss possible stone treatment.  Taking Norco around the clock.  Pain was 10 out 10 yesterday.  Pain has been controlled with the Norco today.          Previous stone episode(s) and workup:  Started having stones about 25 years ago.  Then did well until about 8 years ago.  Then required multiple shockwave procedures bilaterally by urologist in [place].  Had a stent placed was told he was cleared of stones.  Does not sound like he had a stone workup.  Stone sounded like they were calcium oxalate.   Family history:No  Any other stone risk factors:none  Anticoagulation: No  The patient does not have any other urologic issues.  Last PSA 2010 0.94.  Two paternal uncles with prostate cancer.    11/2/22 cysto L urs and stone extraction. Mod bph. L uo minimal irritation.  Large stone in upper pole obstructing the upper pole.  Very inflamed around this.  Hard stone with soft stone around it.  Able to dust most of it and then fragment the rest and removed most of  stone fragments.      Interval history 12/8/22:  Had him do a f/u ctrss 12/1/22 and it showed 2 5mm distal ureteral stones with proximal hydro and still had decent stone burden in kidney. 3 right renal stones all less than 5mm. Still has 5-6 stones in left.  Psa 12/1 was 2.6 (has distal stones).   Taking k cit twice a day. 24 hour urine shows 554 citrate on citrate. Other abnormal findings: 0.96L.  Taking flomax 0.8 nightly. Maybe helping urination. Having some trickling when he sits.   Ua yesterday was negative. Had some pain last night. Not taking any toradol.                       Interval history 12/14/22:  Here today for L urs and stone extraction of uretrer and kidney stones  No fevers or chills. Hasn't had any pain and only has urgency.       Urine history: family history of kidney, bladder or prostate cancer:Yes - prostate, personal or family history of kidney stones: no - ,tobacco use: yes-quit 2008, anticoagulation: No  12/6/22 Neg   10/11/22 Tr leuk/small blood  10/10/22 Tr prot  7/31/10 1+leuk, 26 wbc       PSA History: + fam hx of prostate cancer- 2 paternal uncles  12/8/22 LUCY: 20g no nodules but can only palpate tip  12/1/22 2.6  2010  0.94    Current REVIEW OF SYSTEMS: Negative for the remaining 12 points of ROS except for as stated above    Past Medical History:   Diagnosis Date    Essential (primary) hypertension     Thyroid disease        Fam Hx: Reviewed- pertinent family hx as above    Allergies:  Lisinopril    Objective:     Vitals:    12/14/22 0855   BP: (!) 159/86   Pulse: (!) 53   Resp: 16   Temp: 97.9 °F (36.6 °C)         General:wdwn  in NAD  Neurologic: CN grossly normal. Normal sensation.   Psychiatric: awake, alert and oriented x 3. Mood and affect Normal. Cooperative.  Eyes: PERRLA, normal conjunctiva  Respiratory: no increased work on breathing. No wheezing.   Cardiovascular: No obvious extremity edema. Warm and well perfused.  GI: no obvious stomach distension  Musculoskeletal:  normal  range of motion of bilateral upper extremities. Normal muscle strength and tone.  Skin: no obvious rashes or lesions. No tightening of skin noted.    Pertinent  exam in HPI      LABS REVIEW:  Recent labs:   Recent Labs   Lab 10/10/22  1156 10/19/22  1030   WBC 5.07 5.90   Hemoglobin 14.6 15.7   Hematocrit 43.1 45.2   Platelets 198 217   ]  Recent Labs   Lab 10/10/22  1156 10/19/22  1030   Sodium 141 141  141   Potassium 3.9 4.1  4.1   Chloride 103 103  103   CO2 29 27  27   BUN 18 20  20   Creatinine 1.3 1.2  1.2   Glucose 85 96  96   Calcium 9.2 9.4  9.4   Alkaline Phosphatase 52 L  --    Total Protein 7.2  --    Albumin 4.2  --    Total Bilirubin 0.7  --    AST 26  --    ALT 46 H  --    ]      Assessment:     Tato Weaver is a 57 y.o. male with   No diagnosis found.      Left uvj stone, multiple large left renal stones, r renal stones despite recent stone extaction    Plan:     Short plan:   To OR today for repeat cystoscopy, left ureteroscopy and stone extraction. Repeat LUCY while asleep.  Understands may not be able to remove all the stone on left side. Could be in a pocket  Replacing stent today with another stent on strings if possibl    Special considerations:  Refill: toradol, norco and restart antibiotics  Rbus and kub in 3 months- Need to monitor stones on right- 2mm stones x 3   Psa in 6 months (June 2023) (psa in dec 2.6)  Continue K cit BID (waiting to start- insurance covers)  Continue flomax 0.8mg for now but change to 0.4 eventually (covered)  F/u in 3 months with rbus and kub prior for aua ssx and pvr  More water, no tea    See below for details:   The spontaneous passage rate in 20 weeks was 312 out of 392 stones, 98% in 0-2 mm, 98% in 3 mm, 81% in 4 mm, 65% in 5 mm, 33% in 6 mm and 9% in ?6.5 mm wide stones.  The stone size and location predicted spontaneous ureteric stone passage. The side and the grade of hydronephrosis only predicted stone passage in specific  subgroups    The patient was counseled to go to ER before this if they have fever, chills or pain not able to controlled with pain medication.     The patient has been scheduled for: cystoscopy with left stent placement and possible ureteroscopy and stone extraction     If stent/ureteroscopy planned:  If infection/pus is seen proximal/behind the stone after placement of wire, then the pt will only have a ureteral stent and a catheter placed to maximally drain infection. They will be admitted for IV abx and the gupta will remain until patient's white blood cell count is normal/lower and they do not demonstrate any fevers for 24 hours.   then we will return in 2 to 4 weeks to do a stone extraction with or without laser once infection has been cleared. At that procedure the old stent will be removed and the stone/s will be fragmented and removed and a new ureteral stent replaced but likely on strings so the patient can remove themselves at a designated time so as to avoid another procedure to have the stent removed    If no infection is seen, will attempt to reach stone, laser/fragment it if necessary and then basket out the fragments.  A stent will be placed likely on strings to be removed at the designated time by the patient so as not to have to need another procedure to remove the stent.  it was explained to the patient that it is very easy to remove the stent and does not painful to the remove the stent but in fact the pain usually occurs within a few hours after the stent is removed and is managed with pain medicine.    TYPICAL UROLOGIC MEDICATIONS USED IN PATIENTS WITH STONES  Toradol/ketorolac 10mg (dispense 10)- this is a stronger form of ibuprofen, you can take up to every 8 hours but stop taking ibuprofen. Too much of this medicine can cause kidney failure or stomach ulcers. This helps with the inflammation the body is experiencing from the stent. Take this with food. If you have kidney disease then do not  take this medication, even if it was written for you.  Norco/Hydrocodone 5mg (dispense 15) or Tramadol-  take 1 every 4 to 6 hours for pain not relieved with ibuprofen or Tylenol. If you are taking this regularly you will need to take miralax or stool softeners daily to prevent constipation.   Flomax/Tamsulosin 0.4mg mg (dispense 30) - take once nightly before bedtime (at least 3 hours apart from other high blood pressure medicines) either while trying to pass stones or while the stent is in and for 1 week after stent removed to help relax the tube the stent is in. If you are taking amlodipine/norvasc for blood pressure, take at least four hours apart (preferably norvasc in morning and flomax/tamsulosin after dinner)  Antibiotics - antibiotics were given just before your procedure that will stay in your system for a while.  If you were written for oral antibiotics, take twice daily. Your doctor will specify amount of days to take. If you have another scheduled procedure to have the stone removed more than 2 weeks after the initial procedure, she may ask you to save antibiotics and restart them 3 days before your next procedure.   AZO over the counter -  you can find this at [facility]. Choose one for bladder pain and anti-spasmodic. You can take it up to 3x a day for 3-4 days as long as you have normal kidney function. It may stain your urine and your clothes  Potassium citrate 15meq Twice a day to help increase dietary citrate and prevent stone formation on the stents - expect to see this in the stool, the coating contains the medication.   Ditropan 10mg/Oxybutynin 10mg XL, dispense 30 (take once daily as needed for bladder spasms- can cause constipation, take with stool softeners or fiber gummies)      A ureteral stent is left in place for many reasons:  to keep the ureter open after a procedure as there will be much inflammation and if no stent is left you will experience the same pain as having the stone that caused  the obstruction.   to drain the kidney of infection.  if the stone is up high and closer to the kideny, if the stone is stuck, or you have an infection, the stent will stretch open the small ureter (the tube that drains the kidney) for a few weeks (usually 2 to 4 weeks max) so that we can return, remove the old stent, place instruments into the ureter or kidney to break up and remove the stone. Sometimes you may need another stent after this procedure.     Ureteral Stent Symptoms can include but are not limited to   Stabbing pain in the kidney or bladder with urination during or especially at the end of voiding. Usually we write you for flomax/tamsulosin and toradol/ketorolac to help stretch and relax the ureter while you have the stent in place. We cannot write for toradol/ketorolac if you have poor kidney function. Please call if you have not received these prescriptions.  constant urge to urinate, frequency of urination, severe bladder spasms and severe pain the kidney, especially during urination. If this is very bothersome we can write you for ditropan, an overactive bladder medication to take short term to help with the spasms.   blood in the urine, especially with increased activity. This can last the entire time the stent is in, it can sometimes clear and return or you may never have any at all. I recommend increasing fluid intake to prevent large clots that may be difficult to urinate out.                              Dayana An MD

## 2022-12-14 ENCOUNTER — HOSPITAL ENCOUNTER (OUTPATIENT)
Facility: HOSPITAL | Age: 57
Discharge: HOME | End: 2022-12-14
Attending: UROLOGY | Admitting: UROLOGY
Payer: COMMERCIAL

## 2022-12-14 ENCOUNTER — TELEPHONE (OUTPATIENT)
Dept: UROLOGY | Facility: CLINIC | Age: 57
End: 2022-12-14
Payer: COMMERCIAL

## 2022-12-14 ENCOUNTER — ANESTHESIA (OUTPATIENT)
Dept: SURGERY | Facility: HOSPITAL | Age: 57
End: 2022-12-14
Payer: COMMERCIAL

## 2022-12-14 VITALS
WEIGHT: 250 LBS | SYSTOLIC BLOOD PRESSURE: 141 MMHG | OXYGEN SATURATION: 94 % | DIASTOLIC BLOOD PRESSURE: 89 MMHG | TEMPERATURE: 97 F | BODY MASS INDEX: 35 KG/M2 | HEART RATE: 47 BPM | RESPIRATION RATE: 19 BRPM | HEIGHT: 71 IN

## 2022-12-14 DIAGNOSIS — N20.0 NEPHROLITHIASIS: ICD-10-CM

## 2022-12-14 PROCEDURE — 99900103 DSU ONLY-NO CHARGE-INITIAL HR (STAT): Performed by: UROLOGY

## 2022-12-14 PROCEDURE — 63600175 PHARM REV CODE 636 W HCPCS: Performed by: UROLOGY

## 2022-12-14 PROCEDURE — C1894 INTRO/SHEATH, NON-LASER: HCPCS | Performed by: UROLOGY

## 2022-12-14 PROCEDURE — D9220A PRA ANESTHESIA: ICD-10-PCS | Mod: ANES,,, | Performed by: ANESTHESIOLOGY

## 2022-12-14 PROCEDURE — 52356 PR CYSTO/URETERO W/LITHOTRIPSY: ICD-10-PCS | Mod: 22,LT,, | Performed by: UROLOGY

## 2022-12-14 PROCEDURE — 94799 UNLISTED PULMONARY SVC/PX: CPT

## 2022-12-14 PROCEDURE — 25000003 PHARM REV CODE 250: Performed by: NURSE ANESTHETIST, CERTIFIED REGISTERED

## 2022-12-14 PROCEDURE — 52356 CYSTO/URETERO W/LITHOTRIPSY: CPT | Mod: 22,LT,, | Performed by: UROLOGY

## 2022-12-14 PROCEDURE — D9220A PRA ANESTHESIA: ICD-10-PCS | Mod: CRNA,,, | Performed by: NURSE ANESTHETIST, CERTIFIED REGISTERED

## 2022-12-14 PROCEDURE — 36000706: Performed by: UROLOGY

## 2022-12-14 PROCEDURE — 27201423 OPTIME MED/SURG SUP & DEVICES STERILE SUPPLY: Performed by: UROLOGY

## 2022-12-14 PROCEDURE — 37000009 HC ANESTHESIA EA ADD 15 MINS: Performed by: UROLOGY

## 2022-12-14 PROCEDURE — 74420 UROGRAPHY RTRGR +-KUB: CPT | Mod: 26,,, | Performed by: UROLOGY

## 2022-12-14 PROCEDURE — 71000015 HC POSTOP RECOV 1ST HR: Performed by: UROLOGY

## 2022-12-14 PROCEDURE — C1769 GUIDE WIRE: HCPCS | Performed by: UROLOGY

## 2022-12-14 PROCEDURE — D9220A PRA ANESTHESIA: Mod: CRNA,,, | Performed by: NURSE ANESTHETIST, CERTIFIED REGISTERED

## 2022-12-14 PROCEDURE — C2617 STENT, NON-COR, TEM W/O DEL: HCPCS | Performed by: UROLOGY

## 2022-12-14 PROCEDURE — 25000003 PHARM REV CODE 250: Performed by: ANESTHESIOLOGY

## 2022-12-14 PROCEDURE — 74420 PR  X-RAY RETROGRADE PYELOGRAM: ICD-10-PCS | Mod: 26,,, | Performed by: UROLOGY

## 2022-12-14 PROCEDURE — 37000008 HC ANESTHESIA 1ST 15 MINUTES: Performed by: UROLOGY

## 2022-12-14 PROCEDURE — 25500020 PHARM REV CODE 255: Performed by: UROLOGY

## 2022-12-14 PROCEDURE — 63600175 PHARM REV CODE 636 W HCPCS: Performed by: NURSE ANESTHETIST, CERTIFIED REGISTERED

## 2022-12-14 PROCEDURE — 71000033 HC RECOVERY, INTIAL HOUR: Performed by: UROLOGY

## 2022-12-14 PROCEDURE — 36000707: Performed by: UROLOGY

## 2022-12-14 PROCEDURE — 82365 CALCULUS SPECTROSCOPY: CPT | Performed by: UROLOGY

## 2022-12-14 PROCEDURE — 99900104 DSU ONLY-NO CHARGE-EA ADD'L HR (STAT): Performed by: UROLOGY

## 2022-12-14 PROCEDURE — D9220A PRA ANESTHESIA: Mod: ANES,,, | Performed by: ANESTHESIOLOGY

## 2022-12-14 DEVICE — STENT SET URETERAL 6X26CM: Type: IMPLANTABLE DEVICE | Site: URETER | Status: FUNCTIONAL

## 2022-12-14 RX ORDER — KETOROLAC TROMETHAMINE 30 MG/ML
INJECTION, SOLUTION INTRAMUSCULAR; INTRAVENOUS
Status: DISCONTINUED | OUTPATIENT
Start: 2022-12-14 | End: 2022-12-14

## 2022-12-14 RX ORDER — FENTANYL CITRATE 50 UG/ML
INJECTION, SOLUTION INTRAMUSCULAR; INTRAVENOUS
Status: DISCONTINUED | OUTPATIENT
Start: 2022-12-14 | End: 2022-12-14

## 2022-12-14 RX ORDER — DOXYCYCLINE HYCLATE 100 MG
100 TABLET ORAL 2 TIMES DAILY
Qty: 6 TABLET | Refills: 0 | Status: SHIPPED | OUTPATIENT
Start: 2022-12-14 | End: 2022-12-17

## 2022-12-14 RX ORDER — CIPROFLOXACIN 2 MG/ML
400 INJECTION, SOLUTION INTRAVENOUS
Status: COMPLETED | OUTPATIENT
Start: 2022-12-14 | End: 2022-12-14

## 2022-12-14 RX ORDER — DEXAMETHASONE SODIUM PHOSPHATE 4 MG/ML
INJECTION, SOLUTION INTRA-ARTICULAR; INTRALESIONAL; INTRAMUSCULAR; INTRAVENOUS; SOFT TISSUE
Status: DISCONTINUED | OUTPATIENT
Start: 2022-12-14 | End: 2022-12-14

## 2022-12-14 RX ORDER — OXYCODONE HYDROCHLORIDE 5 MG/1
5 TABLET ORAL
Status: DISPENSED | OUTPATIENT
Start: 2022-12-14

## 2022-12-14 RX ORDER — HYDROMORPHONE HYDROCHLORIDE 2 MG/ML
0.2 INJECTION, SOLUTION INTRAMUSCULAR; INTRAVENOUS; SUBCUTANEOUS EVERY 5 MIN PRN
Status: ACTIVE | OUTPATIENT
Start: 2022-12-14

## 2022-12-14 RX ORDER — ACETAMINOPHEN 10 MG/ML
INJECTION, SOLUTION INTRAVENOUS
Status: DISCONTINUED | OUTPATIENT
Start: 2022-12-14 | End: 2022-12-14

## 2022-12-14 RX ORDER — LIDOCAINE HYDROCHLORIDE 10 MG/ML
1 INJECTION, SOLUTION EPIDURAL; INFILTRATION; INTRACAUDAL; PERINEURAL ONCE
Status: ACTIVE | OUTPATIENT
Start: 2022-12-14

## 2022-12-14 RX ORDER — FENTANYL CITRATE 50 UG/ML
25 INJECTION, SOLUTION INTRAMUSCULAR; INTRAVENOUS EVERY 5 MIN PRN
Status: ACTIVE | OUTPATIENT
Start: 2022-12-14

## 2022-12-14 RX ORDER — ONDANSETRON HYDROCHLORIDE 2 MG/ML
INJECTION, SOLUTION INTRAMUSCULAR; INTRAVENOUS
Status: DISCONTINUED | OUTPATIENT
Start: 2022-12-14 | End: 2022-12-14

## 2022-12-14 RX ORDER — LIDOCAINE HCL/PF 100 MG/5ML
SYRINGE (ML) INTRAVENOUS
Status: DISCONTINUED | OUTPATIENT
Start: 2022-12-14 | End: 2022-12-14

## 2022-12-14 RX ORDER — MIDAZOLAM HYDROCHLORIDE 1 MG/ML
INJECTION INTRAMUSCULAR; INTRAVENOUS
Status: DISCONTINUED | OUTPATIENT
Start: 2022-12-14 | End: 2022-12-14

## 2022-12-14 RX ORDER — PROPOFOL 10 MG/ML
VIAL (ML) INTRAVENOUS
Status: DISCONTINUED | OUTPATIENT
Start: 2022-12-14 | End: 2022-12-14

## 2022-12-14 RX ADMIN — GLYCOPYRROLATE 1 MG: 0.2 INJECTION, SOLUTION INTRAMUSCULAR; INTRAVITREAL at 10:12

## 2022-12-14 RX ADMIN — LIDOCAINE HYDROCHLORIDE 100 MG: 20 INJECTION INTRAVENOUS at 10:12

## 2022-12-14 RX ADMIN — ONDANSETRON 4 MG: 2 INJECTION INTRAMUSCULAR; INTRAVENOUS at 11:12

## 2022-12-14 RX ADMIN — DEXAMETHASONE SODIUM PHOSPHATE 8 MG: 4 INJECTION, SOLUTION INTRA-ARTICULAR; INTRALESIONAL; INTRAMUSCULAR; INTRAVENOUS; SOFT TISSUE at 10:12

## 2022-12-14 RX ADMIN — ACETAMINOPHEN 1000 MG: 10 INJECTION, SOLUTION INTRAVENOUS at 10:12

## 2022-12-14 RX ADMIN — KETOROLAC TROMETHAMINE 30 MG: 30 INJECTION, SOLUTION INTRAMUSCULAR; INTRAVENOUS at 11:12

## 2022-12-14 RX ADMIN — FENTANYL CITRATE 50 MCG: 0.05 INJECTION, SOLUTION INTRAMUSCULAR; INTRAVENOUS at 11:12

## 2022-12-14 RX ADMIN — ONDANSETRON 4 MG: 2 INJECTION INTRAMUSCULAR; INTRAVENOUS at 10:12

## 2022-12-14 RX ADMIN — CIPROFLOXACIN 400 MG: 2 INJECTION INTRAVENOUS at 10:12

## 2022-12-14 RX ADMIN — SODIUM CHLORIDE, SODIUM GLUCONATE, SODIUM ACETATE, POTASSIUM CHLORIDE AND MAGNESIUM CHLORIDE: 526; 502; 368; 37; 30 INJECTION, SOLUTION INTRAVENOUS at 09:12

## 2022-12-14 RX ADMIN — OXYCODONE 5 MG: 5 TABLET ORAL at 12:12

## 2022-12-14 RX ADMIN — FENTANYL CITRATE 50 MCG: 0.05 INJECTION, SOLUTION INTRAMUSCULAR; INTRAVENOUS at 10:12

## 2022-12-14 RX ADMIN — PROPOFOL 200 MG: 10 INJECTION, EMULSION INTRAVENOUS at 10:12

## 2022-12-14 RX ADMIN — MIDAZOLAM HYDROCHLORIDE 2 MG: 1 INJECTION, SOLUTION INTRAMUSCULAR; INTRAVENOUS at 10:12

## 2022-12-14 NOTE — DISCHARGE INSTRUCTIONS
General Information:    1.  Do not drink alcoholic beverages including beer for 24 hours or as long as you are on pain medication..  2.  Do not drive a motor vehicle, operate machinery or power tools, or signs legal papers for 24 hours or as long as you are on pain medication.   3.  You may experience light-headedness, dizziness, and sleepiness following surgery. Please do not stay alone. A responsible adult should be with you for this 24 hour period.  4.  Go home and rest.    5. Progress slowly to a normal diet unless instructed.  Otherwise, begin with liquids such as soft drinks, then soup and crackers working up to solid foods. Drink plenty of nonalcoholic fluids.  6.  Certain anesthetics and pain medications produce nausea and vomiting in certain       individuals. If nausea becomes a problem at home, call you doctor.    7. A nurse will be calling you sometime after surgery. Do not be alarmed. This is our way of finding out how you are doing.    8. Several times every hour while you are awake, take 2-3 deep breaths and cough. If you had stomach surgery hold a pillow or rolled towel firmly against your stomach before you cough. This will help with any pain the cough might cause.  9. Several times every hour while you are awake, pump and flex your feet 5-6 times and do foot circles. This will help prevent blood clots.    10.Call your doctor for severe pain, bleeding, fever, or signs or symptoms of infection (pain, swelling, redness, foul odor, drainage).   Post op instructions for prevention of DVT  What is deep vein thrombosis?  Deep vein thrombosis (DVT) is the medical term for blood clots in the deep veins of the leg.  These blood clots can be dangerous.  A DVT can block a blood vessel and keep blood from getting where it needs to go.  Another problem is that the clot can travel to other parts of the body such as the lungs.  A clot that travels to the lungs is called a pulmonary embolus (PE) and can cause  serious problems with breathing which can lead to death.  Am I at risk for DVT/PE?  If you are not very active, you are at risk of DVT.  Anyone confined to bed, sitting for long periods of time, recovering from surgery, etc. increases the risk of DVT.  Other risk factors are cancer diagnosis, certain medications, estrogen replacement in any form,older age, obesity, pregnancy, smoking, history of clotting disorders, and dehydration.  How will I know if I have a DVT?  Swelling in the lower leg  Pain  Warmth, redness, hardness or bulging of the vein  If you have any of these symptoms, call your doctors office right away.  Some people will not have any symptoms until the clot moves to the lungs.  What are the symptoms of a PE?  Panting, shortness of breath, or trouble breathing  Sharp, knife-like chest pain when you breathe  Coughing or coughing up blood  Rapid heartbeat  If you have any of these symptoms or get worse quickly, call 911 for emergency treatment.  How can I prevent a DVT?  Avoid long periods of inactivity and dont cross your legs--get up and walk around every hour or so.  Stay active--walking after surgery is highly encouraged.  This means you should get out of the house and walk in the neighborhood.  Going up and down stairs will not impair healing (unless advised against such activity by your doctor).    Drink plenty of noncaffeinated, nonalcoholic fluids each day to prevent dehydration.  Wear special support stockings, if they have been advised by your doctor.  If you travel, stop at least once an hour and walk around.  Avoid smoking (assistance with stopping is available through your healthcare provider)  Always notify your doctor if you are not able to follow the post operative instructions that are given to you at the time of discharge.  It may be necessary to prescribe one of the medications available to prevent DVT. Using an Incentive Spirometer    An incentive spirometer is a device that helps  you do deep breathing exercises. These exercises expand your lungs, aid in circulation, and help prevent pneumonia. Deep breathing exercises also help you breathe better and improve the function of your lungs by:  Keeping your lungs clear  Strengthening your breathing muscles  Helping prevent respiratory complications or problems  The incentive spirometer gives you a way to take an active part in recover. A nurse or therapist will teach you breathing exercises. To do these exercises, you will breathe in through your mouth and not your nose. The incentive spirometer only works correctly if you breathe in through your mouth.  Steps to clear lungs  Step 1. Exhale normally. Then, inhale normally.  Relax and breathe out.  Step 2. Place your lips tightly around the mouthpiece.  Make sure the device is upright and not tilted.  Step 3. Inhale as much air as you can through the mouthpiece (don't breath through your nose).  Inhale slowly and deeply.  Hold your breath long enough to keep the balls or disk raised for at least 3 to 5 seconds, or as instructed by your healthcare provider.  Some spirometers have an indicator to let you know that you are breathing in too fast. If the indicator goes off, breathe in more slowly.  Step 4. Repeat the exercise regularly.  Do this exercise every hour while you're awake, or as instructed by your healthcare provider.  If you were taught deep breathing and coughing exercises, do them regularly as instructed by your healthcare provider.      Discharge Instructions: After Your Surgery/Procedure  Youve just had surgery. During surgery you were given medicine called anesthesia to keep you relaxed and free of pain. After surgery you may have some pain or nausea. This is common. Here are some tips for feeling better and getting well after surgery.     Stay on schedule with your medication.   Going home  Your doctor or nurse will show you how to take care of yourself when you go home. He or she  "will also answer your questions. Have an adult family member or friend drive you home.      For your safety we recommend these precaution for the first 24 hours after your procedure:  Do not drive or use heavy equipment.  Do not make important decisions or sign legal papers.  Do not drink alcohol.  Have someone stay with you, if needed. He or she can watch for problems and help keep you safe.  Your concentration, balance, coordination, and judgement may be impaired for many hours after anesthesia.  Use caution when ambulating or standing up.     You may feel weak and "washed out" after anesthesia and surgery.      Subtle residual effects of general anesthesia or sedation with regional / local anesthesia can last more than 24 hours.  Rest for the remainder of the day or longer if your Doctor/Surgeon has advised you to do so.  Although you may feel normal within the first 24 hours, your reflexes and mental ability may be impaired without you realizing it.  You may feel dizzy, lightheaded or sleepy for 24 hours or longer.      Be sure to go to all follow-up visits with your doctor. And rest after your surgery for as long as your doctor tells you to.  Coping with pain  If you have pain after surgery, pain medicine will help you feel better. Take it as told, before pain becomes severe. Also, ask your doctor or pharmacist about other ways to control pain. This might be with heat, ice, or relaxation. And follow any other instructions your surgeon or nurse gives you.  Tips for taking pain medicine  To get the best relief possible, remember these points:  Pain medicines can upset your stomach. Taking them with a little food may help.  Most pain relievers taken by mouth need at least 20 to 30 minutes to start to work.  Taking medicine on a schedule can help you remember to take it. Try to time your medicine so that you can take it before starting an activity. This might be before you get dressed, go for a walk, or sit down " for dinner.  Constipation is a common side effect of pain medicines. Call your doctor before taking any medicines such as laxatives or stool softeners to help ease constipation. Also ask if you should skip any foods. Drinking lots of fluids and eating foods such as fruits and vegetables that are high in fiber can also help. Remember, do not take laxatives unless your surgeon has prescribed them.  Drinking alcohol and taking pain medicine can cause dizziness and slow your breathing. It can even be deadly. Do not drink alcohol while taking pain medicine.  Pain medicine can make you react more slowly to things. Do not drive or run machinery while taking pain medicine.  Your health care provider may tell you to take acetaminophen to help ease your pain. Ask him or her how much you are supposed to take each day. Acetaminophen or other pain relievers may interact with your prescription medicines or other over-the-counter (OTC) drugs. Some prescription medicines have acetaminophen and other ingredients. Using both prescription and OTC acetaminophen for pain can cause you to overdose. Read the labels on your OTC medicines with care. This will help you to clearly know the list of ingredients, how much to take, and any warnings. It may also help you not take too much acetaminophen. If you have questions or do not understand the information, ask your pharmacist or health care provider to explain it to you before you take the OTC medicine.  Managing nausea  Some people have an upset stomach after surgery. This is often because of anesthesia, pain, or pain medicine, or the stress of surgery. These tips will help you handle nausea and eat healthy foods as you get better. If you were on a special food plan before surgery, ask your doctor if you should follow it while you get better. These tips may help:  Do not push yourself to eat. Your body will tell you when to eat and how much.  Start off with clear liquids and soup. They are  easier to digest.  Next try semi-solid foods, such as mashed potatoes, applesauce, and gelatin, as you feel ready.  Slowly move to solid foods. Dont eat fatty, rich, or spicy foods at first.  Do not force yourself to have 3 large meals a day. Instead eat smaller amounts more often.  Take pain medicines with a small amount of solid food, such as crackers or toast, to avoid nausea.     Call your surgeon if  You still have pain an hour after taking medicine. The medicine may not be strong enough.  You feel too sleepy, dizzy, or groggy. The medicine may be too strong.  You have side effects like nausea, vomiting, or skin changes, such as rash, itching, or hives.       If you have obstructive sleep apnea  You were given anesthesia medicine during surgery to keep you comfortable and free of pain. After surgery, you may have more apnea spells because of this medicine and other medicines you were given. The spells may last longer than usual.   At home:  Keep using the continuous positive airway pressure (CPAP) device when you sleep. Unless your health care provider tells you not to, use it when you sleep, day or night. CPAP is a common device used to treat obstructive sleep apnea.  Talk with your provider before taking any pain medicine, muscle relaxants, or sedatives. Your provider will tell you about the possible dangers of taking these medicines.  © 9676-3647 The "[employer]. [address] [place] [id number]. All rights reserved. This information is not intended as a substitute for professional medical care. Always follow your healthcare professional's instructions.     Using Opioids for Pain Management     Your doctor has given instructions for you to take an opioid.  This is a drug for bad pain.  It helps control pain without causing bleeding and kidney problems.  Common opioid names are morphine, hydromorphone, oxycodone, and methadone. These drugs are called narcotics.    There are several safety  concerns you need to know.     It is against the law to give or sell this drug to another person.  You must keep this medicine safely locked.    You may have side effects from taking this medication.  These include nausea, itching, sweating, sleepiness, a change in your ability to breathe, and depression.  Do not take alcohol or sleeping pills opioids.    Long-term opoid use may no longer giver you relief from pain.  It can cause you stomach pain, mental anxiety, and headaches.  Long-term opoid use can potentially lead to unlawful street drug abuse and reduce your ability to stay employed.    Your body may become opioid tolerant if you need to take more to get relief.    You must stop taking opioids if you begin having more pain as a result of the medicine.    Opioid withdrawal occurs when you have to stop taking the drug.  It can cause you to have nausea, vomiting, diarrhea, stomach pain, anxiety, and dilated pupils in your eyes. This condition means you are opioid dependent.    Addiction is a drug induced brain disease. It means there are changes in how your brain is working.  Children, teens, and young adults under 25 years old are more likely to get addicted to opioids.      Addiction can happen with repeated opioid use.  It does not happen with short-term use of two weeks or less.       For more information, please speak with your doctor or pharmacist.       We hope your stay was comfortable as you heal now, mend and rest.    For we have enjoyed taking care of you by giving your our best.    And as you get better, by regaining your health and strength;   We count it as a privilege to have served you and hope your time at [facility] was well spent.      Thank  You!!!

## 2022-12-14 NOTE — PATIENT INSTRUCTIONS
LEAVE AT TOP OF DISCHARGE INSTRUCTIONS    VERY IMPORTANT INSTRUCTIONS FROM  REGARDING YOUR PROCEDURE- PLEASE READ    Your urologist is Dr.Jennifer An  Office number: [phone] ([facility])  Address: [address] [place] [address],  (2nd office building on left); [address] (located on 2nd floor).     The following are specific instructions for Tato Weaver is a 57 y.o. male, so please read CAREFULLY:    If you are on blood thinners and are unsure whether to continue, stop or restart them and it is not mentioned above, then call 's office for further directions    Plan for stent removal on strings:  The patient can remove the stent by pulling strings on tuesday at 6am/early morning (take pain medications prior to removing, expect pain after stent removed but call office or go to ER if pain lasts >48 hours and is severe. See discharge instructions for details.   The stone was sent for an analysis from today and we can send the results to the patient directly.   DRINK MORE WATER, LEMON CONCENTRATE. NO TEA  Return to clinic to see  in 12 weeks to discuss to discuss the 24 hour urine results (help determine why patient makes stones)   You will need an xray and ultrasound beforehand to monitor remaining kidney stones. Contact office if none scheduled to confirm if one needs to be done prior to follow up     or continue the following medications from the pharmacy unless they have some remaining at home:  CONTINUE Tramadol dispense 15 (take every 8 hours as needed for pain- can cause constipation, take with stool softeners or fiber gummies)  CONTINUE Toradol/ketorolac 10mg, dispense 10 (take every 8 hours as needed for pain and alternate with norco, it's like a strong dose Ibuprofen so do not take Ibuprofen if taking this- take with food)  Continue Flomax/tamsulosin 0.4mg ONLY (NOT 0.8), nightly, INDEFINITELY   doxycycline twice a day for 3 days,  dispense 6 (if patient has at least 2 days antibiotics remaining after last procedure can take these instead)   Start  Potassium citrate 15meq Twice a day to help increase dietary citrate and prevent stone formation on the stents - expect to see this in the stool, the coating contains the medication.     You have a ureteral stent in your left collecting system that was placed on 12/14/2022  -the stent can only remain for a maximum of 3 to 6 months. If the stent remains longer than this you can get recurrent urinary tract infections, the stent can have more stones form along it and urine will not be able to drain and you will lose all function of your kidney requiring a major surgery and a big incision to remove the kidney.  The ureter is the tube that drains the kidney (where urine is made). It connects the kidney to the bladder (where urine is stored).   A ureteral stent is a is a soft plastic tube with holes in tube that bypasses anything that obstructs (stone, tumor, scar tissue) the urine from flow from the kidney to the bladder. It is placed by going through the urethra and into the bladder. No incisions are made. Its temporarily inserted into a ureter to help drain urine into the bladder. One end goes in the kidney. The other end goes in the bladder. A coil on each end holds the stent in place. The stent cant be seen from outside the body.     Because you have a string attached to the stent and  has asked you to remove yourself, read the following carefully:  you can remove the stent __tuesday_ at 6am  it will be attached to the outside of your vagina (if you are a female) or to the penis (if you are male) with tape  If the stent is partially or accidentally pulled out you will leak urine until the stent is removed because urine is now coming directly from your kidney and bypassing the urethra (the tube that drains the bladder). Go ahead and remove it if this happens but expect pain for 24 to  48 hours or more after stent removed, especially if it was removed earlier than expected.  If you have not heard from anyone about when to pull the string and remove the stent, remove it at two weeks after your surgery by pulling the string. DO NOT cut the string.   When you remove the string, you should see a small plastic tube about the size of a cocktail straw and about 12 inches long attached to a string that looks like dental floss. If you do not see this, please let call the office and let us know.   What to expect: Removing the stent yourself is not painful, only slightly uncomfortable. However, expect pain AFTER the stent is removed as the ureter closes up due to the inflammation.  left the stent to allow the inflammation in your ureter (the tube that drains the kidney to the bladder) from the procedure and stone to heal, which takes a few days, but the final process of healing comes after the stent is removed. As the ureter closes, the urine backs up and you will experience pain. This should last no more than 24 to 48 hours. There is a chance that a small stone could be trying to pass as well but usually this is just inflammation.   Prior to removing the stent, take a toradol and/or norco 30 minutes before removing the stent in anticipation of this. If the pain lasts >48 hours OR if you have fever, contact .    The goal of placing this stent on a string is to save you from returning from a procedure/cystoscopy where I remove the stent via a camera while you are awake and prevent you from having to return for a nurse visit to have removed. However, if you feel that you cannot remove the string yourself, you can call our clinic and our nurse can pull it for you.     If you do not receive a follow-up date or further instructions on what is to be done next about the stent, it is your responsibility to make sure that you have follow-up to have your stone or stent removed.     A stent  CANNOT remain indefinitely in the kidney. It should not remain if possible more than 3 to 6 months maximum (rarely 1 year if it was placed for cancer).     If you do not follow-up to have your stent removed then you can LOSE YOUR KIDNEY FUNCTION ON THAT SIDE, get RECURRENT URINARY TRACT INFECTIONS,and MORE STONES requiring SURGICAL OPEN removal of your kidney.    You can call our office ([facility] at [phone] and let them know a stent was placed and you need further instructions for follow-up). If there are issues with insurance we will work with you to help you arrange follow-up where it can be removed. Again,  A STENT CANNOT remain indefinitely. Y      A ureteral stent is left in place for many reasons:  to keep the ureter open after a procedure as there will be much inflammation and if no stent is left you will experience the same pain as having the stone that caused the obstruction.   to drain the kidney of infection.  if the stone is up high and closer to the kideny, if the stone is stuck, or you have an infection, the stent will stretch open the small ureter (the tube that drains the kidney) for a few weeks (usually 2 to 4 weeks max) so that we can return, remove the old stent, place instruments into the ureter or kidney to break up and remove the stone. Sometimes you may need another stent after this procedure.     Ureteral Stent Symptoms can include but are not limited to   Stabbing pain in the kidney or bladder with urination during or especially at the end of voiding. Usually we write you for flomax/tamsulosin and toradol/ketorolac to help stretch and relax the ureter while you have the stent in place. We cannot write for toradol/ketorolac if you have poor kidney function. Please call if you have not received these prescriptions.  constant urge to urinate, frequency of urination, severe bladder spasms and severe pain the kidney, especially during urination. If this is very bothersome  we can write you for ditropan, an overactive bladder medication to take short term to help with the spasms.   blood in the urine, especially with increased activity. This can last the entire time the stent is in, it can sometimes clear and return or you may never have any at all. I recommend increasing fluid intake to prevent large clots that may be difficult to urinate out.    Explanation for medications that may have been written for. See the instructions above to see what you were sent home with  Toradol/ketorolac 10mg (dispense 10)- this is a stronger form of ibuprofen, you can take up to every 8 hours but stop taking ibuprofen. Too much of this medicine can cause kidney failure or stomach ulcers. This helps with the inflammation the body is experiencing from the stent. Take this with food. If you have kidney disease then do not take this medication, even if it was written for you.  Tramadol-  take 1 every 4 to 6 hours for pain not relieved with ibuprofen or Tylenol. If you are taking this regularly you will need to take miralax or stool softeners daily to prevent constipation.   Flomax/Tamsulosin 0.4mg mg (dispense 30) - take once nightly before bedtime (at least 3 hours apart from other high blood pressure medicines) while the stent is in and for 1 week after stent removed to help relax the tube the stent is in. If you are taking amlodipine/norvasc for blood pressure, take at least four hours apart (preferably norvasc in morning and flomax/tamsulosin after dinner)  Antibiotics - antibiotics were given just before your procedure that will stay in your system for a while.  If you were written for oral antibiotics, take twice daily. Your doctor will specify amount of days to take. If you have another scheduled procedure to have the stone removed more than 2 weeks after the initial procedure, she may ask you to save antibiotics and restart them 3 days before your next procedure.   AZO over the counter -  you can  find this at [facility]. Choose one for bladder pain and anti-spasmodic. You can take it up to 3x a day for 3-4 days as long as you have normal kidney function. It may stain your urine and your clothes  Potassium citrate 15meq Twice a day to help increase dietary citrate and prevent stone formation on the stents - expect to see this in the stool, the coating contains the medication.   Ditropan 10mg/Oxybutynin 10mg XL, dispense 30 (take once daily as needed for bladder spasms- can cause constipation, take with stool softeners or fiber gummies)    When to go to ER:   fever >101.5 or inability to urinate (no urination for >4 hours and bladder pain) due to thick clots  If you go to the ER with pain, they may check to make sure the stent is in good position with an x-ray or ultrasound but unlikely to do anything else.   I will let you know when we will plan to have the stent either removed at the ambulatory surgical center as an outpatient procedure while you are awake, which is uncomfortable briefly, but not painful or you will remove it yourself by pulling the strings.    6 Easy Ways to Prevent Kidney Stones - please read!!!   Taken from: https://www.kidney.org/atoz/content/kidneystones_prevent    Don't Underestimate Your Sweat. Saunas, hot yoga and heavy exercise may be good for your health, but they also may lead to kidney stones. Why? Loss of water through sweating - whether due to these activities or just the heat of summer--leads to less urine production. The more you sweat, the less you urinate, which allows for stone-causing minerals to settle and bond in the kidneys and urinary tract.  Instead: Hydrate with H2O. One of the best measures you can take to avoid kidney stones is to drink plenty of water, leading you to urinate a lot. So, be sure to keep well hydrated, especially when engaging in exercise or activities that cause a lot of sweating.    It's Not Just the Oxalate. Oxa-what? Oxalate is naturally found in many  "foods, including fruits and vegetables, nuts and seeds, grains, legumes, and even chocolate and tea. Some examples of foods that contain high levels of oxalate include: peanuts, rhubarb, spinach, beets, chocolate and sweet potatoes. Moderating intake of these foods may be beneficial for people who form calcium oxalate stones, the leading type of kidney stones. A common misconception is that cutting the oxalate-rich foods in your diet alone will reduce the likelihood of forming calcium oxalate kidney stones. While in theory this might be true, this approach isn't smart from an overall health perspective. Most kidney stones are formed when oxalate binds to calcium while urine is produced by the kidneys.  Instead: Eat and drink calcium and oxalate-rich foods together during a meal. In doing so, oxalate and calcium are more likely to bind to one another in the stomach and intestines before the kidneys begin processing, making it less likely that kidney stones will form.    Calcium is Not the Enemy. But it tends to get a bad rap! Most likely due to its name and composition, many are under the impression that calcium is the main culprit in calcium-oxalate stones. "I still see patients who wonder why they are getting recurring stones despite cutting down on their calcium intake," said Dr. Fraga. "I've even had patients say that their doctors told them to reduce their calcium intake." A diet low in calcium actually increases one's risk of developing kidney stones.  Instead: Don't reduce the calcium. Work to cut back on the sodium in your diet and to pair calcium-rich foods with oxalate-rich foods.  It's Not One and Done. Passing a kidney stone is often described as one of the most painful experiences a person can have, but unfortunately, it's not always a one-time event. Studies have shown that having even one stone greatly increases your chances of having another. "Most people will want to do anything they can to ensure " "it doesn't happen again," said Dr. Fraga. "Unfortunately, it doesn't seem to be the case that people make the changes they need to after their first stone event." Research conducted by Dr. Fraga shows that those with kidney stones do not always heed the advice of their nephrologists and urinary specialists. About 15% of kidney stone patients didn't take prescribed medications and 41% did not follow the nutritional advice that would keep stones from recurring.  Instead: Take action! Without the right medications and diet adjustments, stones can come back, and recurring kidney stones also could be an indicator of other problems, including kidney disease.    When Life Hands You Kidney Stones don't fret. And as the saying goes, "make lemonade." It's important to consider dietary remedies alongside prescription medications. While it may seem easier to just take a pill to fix a medical problem, consider what lifestyle changes will also make a big impact on your health.  Instead: Next time you drive past a lemonade (or limeade) stand, consider your kidneys. Chronic kidney stones are often treated with potassium citrate, but studies have shown that limeade, lemonade and other fruits and juices high in natural citrate offers the same stone-preventing benefits. Beware of the sugar, though, because it can increase kidney stone risk.   Instead, buy sugar-free lemonade, or make your own by mixing lime or lemon juice with water and using a sugar substitute if needed. "We believe that citrate in the urine may prevent the calcium from binding with other constituents that lead to stones," said Dr. Fraga. "Also, some evidence suggests that citrate may prevent crystals that are already present from binding with each other, thus preventing them from getting bigger."    Not All Stones are Created Equal. In addition to calcium oxalate stones, another common type of kidney stones is uric acid stones. Red meat, organ meats, and " "shellfish have high concentrations of a natural chemical compound known as purines. "High purine intake leads to a higher production of uric acid and produces a larger acid load for the kidneys to excrete," said Dr. Fraga. Higher uric acid excretion leads to lower overall urine pH, which means the urine is more acidic. The high acid concentration of the urine makes it easier for uric acid stones to form.  Instead: To prevent uric acid stones, cut down on high-purine foods such as red meat, organ meats, and shellfish, and follow a healthy diet that contains mostly vegetables and fruits, whole grains, and low fat dairy products. Limit sugar-sweetened foods and drinks, especially those that contain high fructose corn syrup. Limit alcohol because it can increase uric acid levels in the blood and avoid crash diets for the same reason..Eating less animal-based protein and eating more fruits and vegetables will help decrease urine acidity and this will help reduce the chance for stone formation.                "

## 2022-12-14 NOTE — TRANSFER OF CARE
"Anesthesia Transfer of Care Note    Patient: Tato Weaver    Procedure(s) Performed: Procedure(s) (LRB):  CYSTOURETEROSCOPY,WITH HOLMIUM LASER LITHOTRIPSY OF URETERAL CALCULUS (Left)    Patient location: PACU    Anesthesia Type: general    Transport from OR: Transported from OR on 6-10 L/min O2 by face mask with adequate spontaneous ventilation    Post pain: adequate analgesia    Post assessment: no apparent anesthetic complications and tolerated procedure well    Post vital signs: stable    Level of consciousness: sedated    Nausea/Vomiting: no nausea/vomiting    Complications: none    Transfer of care protocol was followed      Last vitals:   Visit Vitals  BP (!) 159/86 (BP Location: Right arm, Patient Position: Lying)   Pulse (!) 53   Temp 36.6 °C (97.9 °F) (Temporal)   Resp 16   Ht 5' 11" (1.803 m)   Wt 113.4 kg (250 lb)   SpO2 96%   BMI 34.87 kg/m²     "

## 2022-12-14 NOTE — TELEPHONE ENCOUNTER
----- Message from Dayana An MD sent at 12/14/2022 12:13 PM CST -----  F/u in 3 months with rbus and kub prior for aua ssx, pvr

## 2022-12-14 NOTE — OP NOTE
[facility]  Operative Note    Date: 12/14/2022    Pre-Op Diagnosis: left ureteral and renal stones    Post-Op Diagnosis: same    Procedure(s) Performed:   Cystoscopy, left retrograde pyelogram  left stent exchange  left  rigid ureteroscopy, laser lithotripsy and basket stone extraction  left flexible nephroscopy, laser lithotripsy and basket stone extraction  Flouro <1 hour    Request for 22 Modifier due to increased level of difficulty  An additional 30 minutes was spent lasering over 1cm remaining stone burden in kidney and access in lower pole.     Specimen(s): stone fragments from left kidney and ureter    Staff Surgeon: Dayana An MD    Anesthesia: General endotracheal anesthesia    Indications: Tato Weaver is a 57 y.o. male with above condition    Findings:  Stone in distal ureter seen.  Hard stone.  Inflammation and distal ureter preventing stone passage..  Hydronephrosis proximal to impacted stone.  In the kidney stone upper pole about 1 cm, fragmented and removed.  In the lower pole stone medial and difficult to reach.  Took a while to get to the stone fragmented and removed.  Other smaller stones in the midpole removed as well.  LUCY: 30g, firm. Mild bph- change back down to flomax 0.4    Estimated Blood Loss: minimal    Drains: 6x26 with strings, left  Implants:   Implant Name Type Inv. Item Serial No.  Lot No. LRB No. Used Action   STENT SET URETERAL 6X26CM - [id number]  STENT SET URETERAL 6X26CM  [employer] [id number] Left 1 Implanted       Implants:   Implant Name Type Inv. Item Serial No.  Lot No. LRB No. Used Action   STENT SET URETERAL 6X26CM - [id number]  STENT SET URETERAL 6X26CM  [employer] [id number] Left 1 Implanted       Procedure in detail:  After informed consent was obtained, the patient was brought the the cystoscopy suite and placed in the supine position.  SCDs were applied and working.  GETA was administered.  The patient was  then placed in the dorsal lithotomy position and prepped and draped in the usual sterile fashion.      A rigid cystoscope in a 22 Fr sheath was introduced into the patient's urethra.  This passed easily.  The entire urethra was visualized which showed no strictures or masses.  Formal cystoscopy was performed which revealed no masses or lesions suspicious for malignancy.  The UOs were visualized in the normal anatomic position bilaterally. The left UO had mild  inflammation from the indwelling stent.     The left stent was grasped and brought to the meatus and a sensor tip wire was passed through the stent.  The stent was removed leaving the wire in place.    left rigid ureteroscopy performed first: A rigid ureteroscope was advanced along the wire to distal ureter.  Stone was seen.  Two hundred seventy Laser used to fragment stone.  Basket used to retrieve the fragments.  Scope advanced to the proximal ureter.. A rgp was performed through the ureteroscope with findings as above. An amplatz super stiff was then advanced through the ureteroscope to the kidney using flouro to confirm leaving a supers stiff and sensor tip wire in place.     A 14/16 45cm  ureteral access sheath was placed over the amplatz. Inner first,  then inner and outer and the inner sheath and amplatz was removed.  A flexible ureteroscope was passed through the outer sheath. The ureteroscope was advanced into the kidney.  A pyeloscopy was performed with findings as above.     A 273  micron laser fiber was passed through the ureteroscope.  The stone(s) were  fragmented using the laser.  The laser fiber was removed and an Ngage basket was introduced through the ureteroscope.      Stone fragments were removed using an nitnol tipless basket.     The ureteroscope and ureteral access sheath was removed keeping a wire in place and evaluating the entire ureter for remaining stone fragments    A cystoscope was reinserted and the bladder was irrigated to  remove the stone fragments.  The bladder was drained the cystoscope removed keeping the wire in place. The wire was backloaded through the stent using a pusher.    A JJ ureteral stent with strings (see findings for size) was passed over the wire and up into the left renal pelvis using fluoro.  When the coil appeared to be in good position in the kidney and the radio-opaque marker of the pusher was at the inferior pubis, the wire was removed under continuous fluoro.  Good coils were seen in the kidney and the bladder using fluoro.       The strings were attached to the penis using benzoin and tegaderm.     The patient tolerated the procedure well and was transferred to the recovery room in stable condition.        Plan:    Plan for stent removal on strings:  The patient can remove the stent by pulling strings on tuesday at 6am/early morning (take pain medications prior to removing, expect pain after stent removed but call office or go to ER if pain lasts >48 hours and is severe. See discharge instructions for details.   The stone was sent for an analysis from today and we can send the results to the patient directly.   DRINK MORE WATER, LEMON CONCENTRATE. NO TEA  Return to clinic to see  in 12 weeks to discuss to discuss the 24 hour urine results (help determine why patient makes stones)   You will need an xray and ultrasound beforehand to monitor remaining kidney stones. Contact office if none scheduled to confirm if one needs to be done prior to follow up     or continue the following medications from the pharmacy unless they have some remaining at home:  CONTINUE Tramadol dispense 15 (take every 8 hours as needed for pain- can cause constipation, take with stool softeners or fiber gummies)  CONTINUE Toradol/ketorolac 10mg, dispense 10 (take every 8 hours as needed for pain and alternate with norco, it's like a strong dose Ibuprofen so do not take Ibuprofen if taking this- take with  food)  Continue Flomax/tamsulosin 0.4mg ONLY (NOT 0.8), nightly, INDEFINITELY   doxycycline twice a day for 3 days, dispense 6 (if patient has at least 2 days antibiotics remaining after last procedure can take these instead)   Start  Potassium citrate 15meq Twice a day to help increase dietary citrate and prevent stone formation on the stents - expect to see this in the stool, the coating contains the medication.

## 2022-12-14 NOTE — ANESTHESIA POSTPROCEDURE EVALUATION
Anesthesia Post Evaluation    Patient: Tato Weaver    Procedure(s) Performed: Procedure(s) (LRB):  CYSTOURETEROSCOPY,WITH HOLMIUM LASER LITHOTRIPSY OF URETERAL CALCULUS (Left)    Final Anesthesia Type: general      Patient location during evaluation: PACU  Patient participation: Yes- Able to Participate  Level of consciousness: awake and alert  Post-procedure vital signs: reviewed and stable  Pain management: adequate  Airway patency: patent    PONV status at discharge: No PONV  Anesthetic complications: no      Cardiovascular status: hemodynamically stable  Respiratory status: unassisted and room air  Hydration status: euvolemic  Follow-up not needed.          Vitals Value Taken Time   /84 12/14/22 1230   Temp 36.3 °C (97.3 °F) 12/14/22 1145   Pulse 52 12/14/22 1232   Resp 20 12/14/22 1232   SpO2 96 % 12/14/22 1232   Vitals shown include unvalidated device data.      No case tracking events are documented in the log.      Pain/Brittanie Score: Pain Rating Prior to Med Admin: 3 (12/14/2022 12:17 PM)  Brittanie Score: 8 (12/14/2022 12:15 PM)

## 2022-12-14 NOTE — DISCHARGE SUMMARY
[facility]  Urology  Discharge Note - Short Stay      Patient Name: Tato Weaver  MRN: [id number]  Discharge Date and Time:  12/14/2022 12:13 PM  Attending Physician: Dayana An,*   Discharging Provider: Dayana An MD  Primary Care Physician: Nixon Denny MD    There are no hospital problems to display for this patient.      Final Diagnoses: Same as principal problem.    Hospital Course: Patient was admitted for an outpatient procedure and tolerated the procedure well with no complications.*    Procedure(s) (LRB):  CYSTOURETEROSCOPY,WITH HOLMIUM LASER LITHOTRIPSY OF URETERAL CALCULUS (Left)     Indwelling Lines/Drains at time of discharge:   Lines/Drains/Airways       Drain  Duration                  Ureteral Drain/Stent 11/02/22 0927 Left ureter 42 days         Ureteral Drain/Stent 12/14/22 1132 Left ureter 6 Fr. <1 day                    Discharged Condition: good    Disposition: home    Follow Up:      Patient Instructions:      US Retroperitoneal Limited   Standing Status: Standing Number of Occurrences: 15 Standing Exp. Date: 03/28/26     Order Specific Question Answer Comments   Reason for Exam: kidney stones, eval for hydro    May the Radiologist modify the order per protocol to meet the clinical needs of the patient? Yes    Release to patient Immediate      X-Ray Abdomen AP 1 View   Standing Status: Standing Number of Occurrences: 15 Standing Exp. Date: 03/28/26     Order Specific Question Answer Comments   Reason for Exam: eval for stones    May the Radiologist modify the order per protocol to meet the clinical needs of the patient? Yes    Release to patient Immediate        Medications:  Reconciled Home Medications:      Medication List        START taking these medications      doxycycline 100 MG tablet  Commonly known as: VIBRA-TABS  Take 1 tablet (100 mg total) by mouth 2 (two) times daily. for 3 days            CONTINUE taking these medications       HYDROcodone-acetaminophen  mg per tablet  Commonly known as: NORCO  Take 1 tablet by mouth every 6 (six) hours as needed for Pain.     ketorolac 10 mg tablet  Commonly known as: TORADOL  Take 1 tablet (10 mg total) by mouth every 8 (eight) hours as needed for Pain.     levothyroxine 200 MCG tablet  Commonly known as: SYNTHROID  Take 200 mcg by mouth before breakfast.     losartan 50 MG tablet  Commonly known as: COZAAR  Take 50 mg by mouth once daily.     potassium citrate 15 mEq Tbsr  Commonly known as: UROCIT-K 15  Take 15 mEq by mouth 2 (two) times a day. Take twice daily for stone prevention. Will see in still     tamsulosin 0.4 mg Cap  Commonly known as: FLOMAX  Take 1 capsule (0.4 mg total) by mouth once daily.              Discharge Procedure Orders (must include Diet, Follow-up, Activity):   Discharge Procedure Orders (must include Diet, Follow-up, Activity)   US Retroperitoneal Limited   Standing Status: Standing Number of Occurrences: 15 Standing Exp. Date: 03/28/26     Order Specific Question Answer Comments   Reason for Exam: kidney stones, eval for hydro    May the Radiologist modify the order per protocol to meet the clinical needs of the patient? Yes    Release to patient Immediate      X-Ray Abdomen AP 1 View   Standing Status: Standing Number of Occurrences: 15 Standing Exp. Date: 03/28/26     Order Specific Question Answer Comments   Reason for Exam: eval for stones    May the Radiologist modify the order per protocol to meet the clinical needs of the patient? Yes    Release to patient Immediate       Plan for stent removal on strings:  The patient can remove the stent by pulling strings on tuesday at 6am/early morning (take pain medications prior to removing, expect pain after stent removed but call office or go to ER if pain lasts >48 hours and is severe. See discharge instructions for details.   The stone was sent for an analysis from today and we can send the results to the patient  directly.   DRINK MORE WATER, LEMON CONCENTRATE. NO TEA  Return to clinic to see  in 12 weeks to discuss to discuss the 24 hour urine results (help determine why patient makes stones)   You will need an xray and ultrasound beforehand to monitor remaining kidney stones. Contact office if none scheduled to confirm if one needs to be done prior to follow up     or continue the following medications from the pharmacy unless they have some remaining at home:  CONTINUE Tramadol dispense 15 (take every 8 hours as needed for pain- can cause constipation, take with stool softeners or fiber gummies)  CONTINUE Toradol/ketorolac 10mg, dispense 10 (take every 8 hours as needed for pain and alternate with norco, it's like a strong dose Ibuprofen so do not take Ibuprofen if taking this- take with food)  Continue Flomax/tamsulosin 0.4mg ONLY (NOT 0.8), nightly, INDEFINITELY   doxycycline twice a day for 3 days, dispense 6 (if patient has at least 2 days antibiotics remaining after last procedure can take these instead)   Start  Potassium citrate 15meq Twice a day to help increase dietary citrate and prevent stone formation on the stents - expect to see this in the stool, the coating contains the medication.       Dayana An MD  Urology  [facility] [place] [facility]

## 2022-12-14 NOTE — ANESTHESIA PREPROCEDURE EVALUATION
12/14/2022  Tato Weaver is a 57 y.o., male.      Pre-op Assessment    I have reviewed the Patient Summary Reports.     I have reviewed the Nursing Notes. I have reviewed the NPO Status.   I have reviewed the Medications.     Review of Systems  Anesthesia Hx:  No problems with previous Anesthesia    Social:  Former Smoker    Hematology/Oncology:  Hematology Normal   Oncology Normal     EENT/Dental:EENT/Dental Normal   Cardiovascular:   Exercise tolerance: good Hypertension, well controlled    Pulmonary:  Pulmonary Normal    Renal/:   Chronic Renal Disease renal calculi    Hepatic/GI:  Hepatic/GI Normal    Musculoskeletal:  Musculoskeletal Normal    Neurological:  Neurology Normal    Endocrine:  Endocrine Normal  Obesity / BMI > 30  Dermatological:  Skin Normal    Psych:  Psychiatric Normal           Physical Exam  General: Well nourished, Cooperative, Alert and Oriented    Airway:  Mallampati: III / II  Mouth Opening: Normal  TM Distance: Normal  Tongue: Normal  Neck ROM: Normal ROM    Dental:  Partial Dentures        Anesthesia Plan  Type of Anesthesia, risks & benefits discussed:    Anesthesia Type: Gen Supraglottic Airway, Gen ETT  Intra-op Monitoring Plan: Standard ASA Monitors  Post Op Pain Control Plan: multimodal analgesia and IV/PO Opioids PRN  Induction:  IV  Informed Consent: Informed consent signed with the Patient and all parties understand the risks and agree with anesthesia plan.  All questions answered.   ASA Score: 2  Day of Surgery Review of History & Physical: H&P Update referred to the surgeon/provider.    Ready For Surgery From Anesthesia Perspective.     .

## 2022-12-14 NOTE — PLAN OF CARE
Discharged home with spouse post voiding, spoke with  in the discharge area, handouts provided, and new strainer given per request. All valuables were returned, IV removed and both stated understanding of instructions given. Wheelchair provided for discharge, safely brought to vehicle

## 2022-12-15 NOTE — PROGRESS NOTES
Criteria met per anesthesia for transfer to post op Pain and nausea controlled Tolerating po liquids. Transferred per stretcher to Phase 2 Report given to Colette. Pts wife at bedside.  
Very pleased with care given.  
Prior to Admission

## 2022-12-20 LAB
COMPN STONE: NORMAL
SPECIMEN SOURCE: NORMAL
STONE ANALYSIS IR-IMP: NORMAL

## 2023-05-31 ENCOUNTER — OCCUPATIONAL HEALTH (OUTPATIENT)
Dept: URGENT CARE | Facility: CLINIC | Age: 58
End: 2023-05-31

## 2023-05-31 DIAGNOSIS — Z00.00 ENCOUNTER FOR PHYSICAL EXAMINATION: Primary | ICD-10-CM

## 2023-05-31 PROCEDURE — 99499 DOT PHYSICAL: ICD-10-PCS | Mod: S$GLB,,, | Performed by: PHYSICIAN ASSISTANT

## 2023-05-31 PROCEDURE — 99499 UNLISTED E&M SERVICE: CPT | Mod: S$GLB,,, | Performed by: PHYSICIAN ASSISTANT

## 2023-11-16 RX ORDER — POTASSIUM CITRATE 15 MEQ/1
15 TABLET, EXTENDED RELEASE ORAL 2 TIMES DAILY
Qty: 180 TABLET | Refills: 3 | Status: SHIPPED | OUTPATIENT
Start: 2023-11-16 | End: 2024-11-15

## 2023-11-16 NOTE — TELEPHONE ENCOUNTER
This patient has not been seen by urology in over a year.      A prescription was refilled for pot cit 90 days with NO refills but the patient will need a one year FOLLOW UP with me or urology nurse practitioner (whomever has first availability).    Patient can ask their pcp to refill until then.     Please see their last note and see if they needed labs and/or imaging and confirm the patient was not discharged from our clinic back to their pcp.

## 2024-06-25 ENCOUNTER — OFFICE VISIT (OUTPATIENT)
Dept: URGENT CARE | Facility: CLINIC | Age: 59
End: 2024-06-25
Payer: COMMERCIAL

## 2024-06-25 VITALS
WEIGHT: 254.31 LBS | TEMPERATURE: 101 F | SYSTOLIC BLOOD PRESSURE: 161 MMHG | RESPIRATION RATE: 18 BRPM | DIASTOLIC BLOOD PRESSURE: 90 MMHG | HEIGHT: 71 IN | OXYGEN SATURATION: 97 % | BODY MASS INDEX: 35.6 KG/M2

## 2024-06-25 DIAGNOSIS — U07.1 COVID-19 VIRUS DETECTED: ICD-10-CM

## 2024-06-25 DIAGNOSIS — R09.82 POST-NASAL DRIP: ICD-10-CM

## 2024-06-25 DIAGNOSIS — R05.9 COUGH, UNSPECIFIED TYPE: ICD-10-CM

## 2024-06-25 DIAGNOSIS — R09.81 SINUS CONGESTION: ICD-10-CM

## 2024-06-25 DIAGNOSIS — R50.9 FEVER, UNSPECIFIED FEVER CAUSE: ICD-10-CM

## 2024-06-25 DIAGNOSIS — U07.1 COVID-19 VIRUS INFECTION: Primary | ICD-10-CM

## 2024-06-25 LAB
CTP QC/QA: YES
FLUAV AG NPH QL: NEGATIVE
FLUBV AG NPH QL: NEGATIVE
S PYO RRNA THROAT QL PROBE: NEGATIVE
SARS-COV-2 RDRP RESP QL NAA+PROBE: POSITIVE

## 2024-06-25 PROCEDURE — 87635 SARS-COV-2 COVID-19 AMP PRB: CPT | Mod: QW,S$GLB,,

## 2024-06-25 PROCEDURE — 87804 INFLUENZA ASSAY W/OPTIC: CPT | Mod: QW,,,

## 2024-06-25 PROCEDURE — 87880 STREP A ASSAY W/OPTIC: CPT | Mod: QW,,,

## 2024-06-25 PROCEDURE — 99214 OFFICE O/P EST MOD 30 MIN: CPT | Mod: S$GLB,,,

## 2024-06-25 RX ORDER — CETIRIZINE HYDROCHLORIDE 10 MG/1
10 TABLET ORAL DAILY
Qty: 30 TABLET | Refills: 0 | Status: SHIPPED | OUTPATIENT
Start: 2024-06-25

## 2024-06-25 RX ORDER — FLUTICASONE PROPIONATE 50 MCG
1 SPRAY, SUSPENSION (ML) NASAL DAILY
Qty: 15.8 ML | Refills: 0 | Status: SHIPPED | OUTPATIENT
Start: 2024-06-25

## 2024-06-25 RX ORDER — PROMETHAZINE HYDROCHLORIDE AND DEXTROMETHORPHAN HYDROBROMIDE 6.25; 15 MG/5ML; MG/5ML
5 SYRUP ORAL EVERY 4 HOURS PRN
Qty: 118 ML | Refills: 0 | Status: SHIPPED | OUTPATIENT
Start: 2024-06-25 | End: 2024-07-05

## 2024-06-25 RX ORDER — BENZONATATE 200 MG/1
200 CAPSULE ORAL 3 TIMES DAILY PRN
Qty: 30 CAPSULE | Refills: 0 | Status: SHIPPED | OUTPATIENT
Start: 2024-06-25 | End: 2024-07-05

## 2024-06-25 RX ORDER — GUAIFENESIN AND DEXTROMETHORPHAN HYDROBROMIDE 10; 100 MG/5ML; MG/5ML
5 SYRUP ORAL EVERY 6 HOURS PRN
Qty: 200 ML | Refills: 0 | Status: SHIPPED | OUTPATIENT
Start: 2024-06-25 | End: 2024-07-05

## 2024-06-25 RX ORDER — ACETAMINOPHEN 500 MG
1000 TABLET ORAL
Status: COMPLETED | OUTPATIENT
Start: 2024-06-25 | End: 2024-06-25

## 2024-06-25 RX ADMIN — Medication 1000 MG: at 10:06

## 2024-06-25 NOTE — PROGRESS NOTES
"Subjective:      Patient ID: Tato Weaver is a 58 y.o. male.    Vitals:  height is 5' 11" (1.803 m) and weight is 115.3 kg (254 lb 4.8 oz). His oral temperature is 101.3 °F (38.5 °C) (abnormal). His blood pressure is 161/90 (abnormal). His respiration is 18 and oxygen saturation is 97%.     Chief Complaint: Cough    Symptoms started on Sunday and include fever, cough, nasal congestion, postnasal drip, body aches, fatigue.  Patient denies shortness of breath or difficulty breathing.  He has a nonproductive cough.    Cough  Associated symptoms include chills, a fever and postnasal drip. Pertinent negatives include no shortness of breath or wheezing.       Constitution: Positive for chills, fatigue and fever.   HENT:  Positive for congestion, postnasal drip and sinus pressure.    Cardiovascular: Negative.    Respiratory:  Positive for cough. Negative for sputum production, shortness of breath and wheezing.    Gastrointestinal: Negative.    Musculoskeletal: Negative.    Psychiatric/Behavioral: Negative.        Objective:     Physical Exam   Constitutional: He is oriented to person, place, and time. He appears well-developed. He is cooperative.  Non-toxic appearance. He does not appear ill. No distress.   HENT:   Head: Normocephalic and atraumatic.   Ears:   Right Ear: Hearing, tympanic membrane, external ear and ear canal normal.   Left Ear: Hearing, tympanic membrane, external ear and ear canal normal.   Nose: Rhinorrhea and congestion present. No mucosal edema or nasal deformity. No epistaxis. Right sinus exhibits no maxillary sinus tenderness and no frontal sinus tenderness. Left sinus exhibits no maxillary sinus tenderness and no frontal sinus tenderness.   Mouth/Throat: Uvula is midline, oropharynx is clear and moist and mucous membranes are normal. Mucous membranes are moist. No trismus in the jaw. Normal dentition. No uvula swelling. No oropharyngeal exudate, posterior oropharyngeal edema or posterior " oropharyngeal erythema.   Eyes: Conjunctivae and lids are normal. No scleral icterus.   Neck: Trachea normal and phonation normal. Neck supple. No edema present. No erythema present. No neck rigidity present.   Cardiovascular: Normal rate, regular rhythm and normal heart sounds.   Pulmonary/Chest: Effort normal and breath sounds normal. No respiratory distress. He has no decreased breath sounds. He has no wheezes. He has no rhonchi. He has no rales.   Abdominal: Normal appearance.   Musculoskeletal: Normal range of motion.         General: No deformity. Normal range of motion.   Neurological: He is alert and oriented to person, place, and time. He displays no weakness. He exhibits normal muscle tone.   Skin: Skin is warm, dry, intact, not diaphoretic and not pale.   Psychiatric: His speech is normal and behavior is normal. Mood, judgment and thought content normal.   Nursing note and vitals reviewed.      Assessment:     1. COVID-19 virus infection    2. Cough, unspecified type    3. Fever, unspecified fever cause    4. Sinus congestion    5. Post-nasal drip        Plan:       COVID-19 virus infection    Cough, unspecified type  -     POCT COVID-19 Rapid Screening  -     POCT Influenza A/B Rapid Antigen  -     POCT rapid strep A  -     dextromethorphan-guaiFENesin  mg/5 ml (ROBITUSSIN-DM)  mg/5 mL liquid; Take 5 mLs by mouth every 6 (six) hours as needed (cough).  Dispense: 200 mL; Refill: 0  -     promethazine-dextromethorphan (PROMETHAZINE-DM) 6.25-15 mg/5 mL Syrp; Take 5 mLs by mouth every 4 (four) hours as needed (Cough).  Dispense: 118 mL; Refill: 0  -     benzonatate (TESSALON) 200 MG capsule; Take 1 capsule (200 mg total) by mouth 3 (three) times daily as needed for Cough.  Dispense: 30 capsule; Refill: 0    Fever, unspecified fever cause  -     acetaminophen tablet 1,000 mg    Sinus congestion  -     cetirizine (ZYRTEC) 10 MG tablet; Take 1 tablet (10 mg total) by mouth once daily.  Dispense: 30  tablet; Refill: 0  -     fluticasone propionate (FLONASE) 50 mcg/actuation nasal spray; 1 spray (50 mcg total) by Each Nostril route once daily.  Dispense: 15.8 mL; Refill: 0    Post-nasal drip      COVID: Positive  Flu: Negative  Strep: Negative    Discussed medication with patient who acknowledges understanding and is agreeable to POC. Follow up with primary care. Increase fluid intake. Red flags for ER discussed.

## 2024-07-17 DIAGNOSIS — R09.81 SINUS CONGESTION: ICD-10-CM

## 2024-09-11 RX ORDER — FLUTICASONE PROPIONATE 50 MCG
SPRAY, SUSPENSION (ML) NASAL
Qty: 48 ML | Refills: 1 | OUTPATIENT
Start: 2024-09-11

## 2024-09-11 RX ORDER — CETIRIZINE HYDROCHLORIDE 10 MG/1
10 TABLET ORAL
Qty: 90 TABLET | Refills: 1 | OUTPATIENT
Start: 2024-09-11

## 2025-05-30 ENCOUNTER — OFFICE VISIT (OUTPATIENT)
Dept: FAMILY MEDICINE | Facility: CLINIC | Age: 60
End: 2025-05-30
Payer: COMMERCIAL

## 2025-05-30 VITALS
WEIGHT: 265 LBS | SYSTOLIC BLOOD PRESSURE: 162 MMHG | HEIGHT: 71 IN | BODY MASS INDEX: 37.1 KG/M2 | DIASTOLIC BLOOD PRESSURE: 100 MMHG | HEART RATE: 73 BPM | OXYGEN SATURATION: 97 %

## 2025-05-30 DIAGNOSIS — R53.83 FATIGUE, UNSPECIFIED TYPE: ICD-10-CM

## 2025-05-30 DIAGNOSIS — R73.03 PREDIABETES: ICD-10-CM

## 2025-05-30 DIAGNOSIS — I10 ESSENTIAL (PRIMARY) HYPERTENSION: ICD-10-CM

## 2025-05-30 DIAGNOSIS — R20.2 PARESTHESIA: ICD-10-CM

## 2025-05-30 DIAGNOSIS — E66.01 CLASS 2 SEVERE OBESITY DUE TO EXCESS CALORIES WITH SERIOUS COMORBIDITY AND BODY MASS INDEX (BMI) OF 36.0 TO 36.9 IN ADULT: ICD-10-CM

## 2025-05-30 DIAGNOSIS — N20.0 KIDNEY STONES: ICD-10-CM

## 2025-05-30 DIAGNOSIS — E66.812 CLASS 2 SEVERE OBESITY DUE TO EXCESS CALORIES WITH SERIOUS COMORBIDITY AND BODY MASS INDEX (BMI) OF 36.0 TO 36.9 IN ADULT: ICD-10-CM

## 2025-05-30 DIAGNOSIS — Z76.89 ENCOUNTER TO ESTABLISH CARE: Primary | ICD-10-CM

## 2025-05-30 DIAGNOSIS — E07.9 THYROID DISEASE: ICD-10-CM

## 2025-05-30 PROCEDURE — 99999 PR PBB SHADOW E&M-EST. PATIENT-LVL III: CPT | Mod: PBBFAC,,,

## 2025-05-30 RX ORDER — LEVOTHYROXINE SODIUM 125 UG/1
125 TABLET ORAL
COMMUNITY

## 2025-05-30 RX ORDER — HYDROCHLOROTHIAZIDE 12.5 MG/1
12.5 TABLET ORAL DAILY
Qty: 90 TABLET | Refills: 0 | Status: SHIPPED | OUTPATIENT
Start: 2025-05-30

## 2025-05-30 RX ORDER — LOSARTAN POTASSIUM 100 MG/1
100 TABLET ORAL DAILY
Qty: 90 TABLET | Refills: 0 | Status: SHIPPED | OUTPATIENT
Start: 2025-05-30

## 2025-05-30 NOTE — PROGRESS NOTES
SUBJECTIVE:      Patient ID: Tato Weaver is a 59 y.o. male.    Chief Complaint: Hypertension, Thyroid Problem, and Extremity Weakness (In both legs and arms. Started 1-2 yrs ago. Says its difficult to  onto things)    History of Present Illness    CHIEF COMPLAINT:  Tato presents to establish care and address concerns about hypertension, hand and feet issues, and overall weakness and fatigue.    HPI:  Tato is a 59-year-old male, who has hypertension, hypothyroidism, prediabetes, and history of kidney stones.      He  reports weakness and fatigue, making it difficult to climb stairs or equipment at work. He has involuntary hand movements, occasional numbness and tingling in his hands, and difficulty grasping objects. Tato notes leg weakness, making climbing more challenging.    Tato reports foot pain without tingling sensation. He wears steel-toed shoes for work, which may contribute to foot discomfort.  Denies swelling in feet, difficulty ambulating, or skin changes.  He also mentions knee pain, especially when kneeling, which he attributes to poor self-care.    Tato has longstanding leg swelling, visible in his socks. The duration of these symptoms is unspecified.    Tato's blood pressure is uncontrolled, measuring 162/100 during the visit. He is currently taking Losartan 50 mg daily for hypertension. Tato has reduced his caffeine intake to a 24-ounce cup of coffee in the morning. He frequently uses [employer] seasoning, which contains salt, in his food.    Tato mentions a previous pre-diabetes diagnosis by Dr. White, who has since retired. He states this diagnosis was related to his weight.    He is currently taking Synthroid 125 mcg daily, states that he would like to switch to generic due to cost.  Reports that he has never been on the generic form endorse he had any complications from the medication before.  States he has not had thyroid function labs completed in a very long  time.  Denies hair loss, irritability, anxiety, palpitations.    Tato denies chest pain, chest tightness, shortness of breath, dizziness, and vision changes. He denies any lung disorders or heart disease besides hypertension.    MEDICATIONS:  Tato is on Synthroid 125 mcg daily for hypothyroidism and Losartan 50 mg daily for hypertension. He was previously on Synthroid 200 mcg, which was decreased to 125 mcg by his previous doctor, Dr. White.    SOCIAL HISTORY:  Alcohol: Drinks a glass or two of wine a couple times per week  Smoking: Denies  Illicit drugs: Denies   Occupation:  at [employer] for 30 years  Sedentary lifestyle  Marital status:      FAMILY HISTORY:  Family history is significant for the patient's older sister with cancer of unknown type. His mother had no known medical problems and passed away in her sleep about a year ago. His father  of cancer that metastasized to the brain at age 77, 20-25 years ago. Tato's sister Viet has some medical condition. His younger sister experienced a stroke and heart attack, resulting in speech difficulties.    SURGICAL HISTORY:  Tato underwent arthroscopic surgery on both knees approximately 20 years ago, performed by Dr. Ordonez.           Hypertension  Pertinent negatives include no chest pain, headaches, palpitations or shortness of breath. Identifiable causes of hypertension include a thyroid problem.   Thyroid Problem  Symptoms include fatigue. Patient reports no anxiety, cold intolerance, constipation, diarrhea, heat intolerance or palpitations.   Extremity Weakness   Pertinent negatives include no fever or headaches.      Review of Systems   Constitutional:  Positive for fatigue. Negative for appetite change, chills, fever and unexpected weight change.   HENT:  Negative for congestion, ear pain, nosebleeds, postnasal drip, rhinorrhea, sinus pressure, sinus pain and sore throat.    Eyes:  Negative for photophobia and pain.    Respiratory:  Negative for cough, choking, chest tightness, shortness of breath, wheezing and stridor.    Cardiovascular:  Positive for leg swelling. Negative for chest pain and palpitations.   Gastrointestinal:  Negative for abdominal pain, constipation, diarrhea, nausea and vomiting.   Endocrine: Negative for cold intolerance, heat intolerance, polydipsia, polyphagia and polyuria.   Genitourinary:  Negative for difficulty urinating, dysuria and hematuria.   Musculoskeletal:  Positive for arthralgias (Bilateral feet) and extremity weakness. Negative for back pain, gait problem, joint swelling and myalgias.        Paresthesia in bilateral hands   Skin:  Negative for rash.   Neurological:  Negative for dizziness, seizures, syncope, speech difficulty, weakness and headaches.   Hematological:  Negative for adenopathy. Does not bruise/bleed easily.   Psychiatric/Behavioral:  Negative for agitation, behavioral problems, confusion, decreased concentration, dysphoric mood, hallucinations, self-injury, sleep disturbance and suicidal ideas. The patient is not nervous/anxious and is not hyperactive.        Past Medical History:   Diagnosis Date    Essential (primary) hypertension     Kidney stones     Thyroid disease      Current Medications[1]  Review of patient's allergies indicates:   Allergen Reactions    Lisinopril      Cough      Past Surgical History:   Procedure Laterality Date    CYSTOSCOPY WITH URETEROSCOPY, RETROGRADE PYELOGRAPHY, AND INSERTION OF STENT Left 11/2/2022    Procedure: CYSTOSCOPY, WITH RETROGRADE PYELOGRAM AND URETERAL STENT INSERTION;  Surgeon: Dayana An MD;  Location: [facility] OR;  Service: Urology;  Laterality: Left;    CYSTOURETEROSCOPY,WITH HOLMIUM LASER LITHOTRIPSY OF URETERAL CALCULUS Left 12/14/2022    Procedure: CYSTOURETEROSCOPY,WITH HOLMIUM LASER LITHOTRIPSY OF URETERAL CALCULUS;  Surgeon: Dayana An MD;  Location: [facility] OR;  Service: Urology;  Laterality: Left;     HERNIA REPAIR      kidney stones      KNEE ARTHROSCOPY W/ LASER      LASER LITHOTRIPSY Left 11/2/2022    Procedure: LITHOTRIPSY, USING LASER;  Surgeon: Dayana An MD;  Location: [facility] OR;  Service: Urology;  Laterality: Left;    RETROGRADE PYELOGRAPHY Left 10/11/2022    Procedure: PYELOGRAM, RETROGRADE;  Surgeon: Dayana An MD;  Location: [facility] OR;  Service: Urology;  Laterality: Left;    URETERAL STENT PLACEMENT N/A 10/11/2022    Procedure: INSERTION, STENT, URETER;  Surgeon: Dayana An MD;  Location: [facility] OR;  Service: Urology;  Laterality: N/A;    URETEROSCOPIC REMOVAL OF URETERIC CALCULUS Left 11/2/2022    Procedure: REMOVAL, CALCULUS, URETER, URETEROSCOPIC;  Surgeon: Dayana An MD;  Location: [facility] OR;  Service: Urology;  Laterality: Left;     Social History     Socioeconomic History    Marital status:    Tobacco Use    Smoking status: Former     Current packs/day: 0.00     Average packs/day: 1 pack/day for 31.0 years (31.0 ttl pk-yrs)     Types: Cigarettes     Start date: 1979     Quit date: 2010     Years since quitting: 15.4    Smokeless tobacco: Never   Substance and Sexual Activity    Alcohol use: Yes    Drug use: Never     Social Drivers of Health     Financial Resource Strain: Low Risk  (5/25/2025)    Overall Financial Resource Strain (CARDIA)     Difficulty of Paying Living Expenses: Not hard at all   Food Insecurity: No Food Insecurity (5/25/2025)    Hunger Vital Sign     Worried About Running Out of Food in the Last Year: Never true     Ran Out of Food in the Last Year: Never true   Transportation Needs: No Transportation Needs (5/25/2025)    PRAPARE - Transportation     Lack of Transportation (Medical): No     Lack of Transportation (Non-Medical): No   Physical Activity: Unknown (5/25/2025)    Exercise Vital Sign     Days of Exercise per Week: 5 days   Stress: No Stress Concern Present (5/25/2025)    [place] [employer]  "- Occupational Stress Questionnaire     Feeling of Stress : Not at all   Housing Stability: Low Risk  (5/25/2025)    Housing Stability Vital Sign     Unable to Pay for Housing in the Last Year: No     Number of Times Moved in the Last Year: 0     Homeless in the Last Year: No     Family History   Problem Relation Name Age of Onset    No Known Problems Mother      Cancer Father      Cancer Sister      Heart attack Sister            OBJECTIVE:      Vitals:    05/30/25 0840   BP: (!) 162/100   BP Location: Right arm   Patient Position: Sitting   Pulse: 73   SpO2: 97%   Weight: 120.2 kg (264 lb 15.9 oz)   Height: 5' 11" (1.803 m)     Physical Exam  Vitals and nursing note reviewed.   Constitutional:       General: He is not in acute distress.     Appearance: Normal appearance. He is well-developed. He is obese. He is not ill-appearing or toxic-appearing.      Comments: BMI 36   HENT:      Head: Normocephalic and atraumatic.      Right Ear: External ear normal.      Left Ear: External ear normal.      Nose: Nose normal. No congestion or rhinorrhea.      Mouth/Throat:      Mouth: Mucous membranes are moist.      Pharynx: Oropharynx is clear. No oropharyngeal exudate or posterior oropharyngeal erythema.   Eyes:      General: No scleral icterus.        Right eye: No discharge.         Left eye: No discharge.      Extraocular Movements: Extraocular movements intact.      Conjunctiva/sclera: Conjunctivae normal.      Pupils: Pupils are equal, round, and reactive to light.   Neck:      Thyroid: No thyroid mass or thyromegaly.      Trachea: Trachea normal.   Cardiovascular:      Rate and Rhythm: Normal rate and regular rhythm.      Pulses: Normal pulses.      Heart sounds: Normal heart sounds. No murmur heard.     Comments: Blood pressure 162/100  Pulmonary:      Effort: Pulmonary effort is normal. No respiratory distress.      Breath sounds: Normal breath sounds. No wheezing.   Musculoskeletal:         General: Normal range of " motion.      Cervical back: Normal range of motion and neck supple. No tenderness.      Right lower leg: Edema (1/4) present.      Left lower leg: Edema (1/4) present.   Lymphadenopathy:      Cervical: No cervical adenopathy.   Skin:     General: Skin is warm and dry.      Coloration: Skin is not pale.      Findings: No erythema or rash.   Neurological:      Mental Status: He is alert and oriented to person, place, and time.   Psychiatric:         Mood and Affect: Mood is anxious.         Behavior: Behavior normal.         Thought Content: Thought content normal.         Judgment: Judgment normal.            Assessment:       1. Encounter to establish care    2. Essential (primary) hypertension    3. Thyroid disease    4. Kidney stones    5. Prediabetes    6. Class 2 severe obesity due to excess calories with serious comorbidity and body mass index (BMI) of 36.0 to 36.9 in adult    7. Fatigue, unspecified type    8. Paresthesia        Plan:       Assessment & Plan    Assessed uncontrolled HTN with /100; increased Losartan from 50 mg to 100 mg daily and added HCTZ to be taken in morning for better control.  Evaluated reported hand tremors, weakness, and fatigue; ordered comprehensive labs to investigate potential underlying causes including anemia, vitamin deficiencies, thyroid dysfunction, and diabetes.  Considered pre-existing hypothyroidism; continued Synthroid 125 mcg daily and planning to review thyroid function with potential adjustment based on lab results; instructed to wait for lab results before switching to generic levothyroxine due to potential differences in thyroid levels.  Noted high BMI and history of prediabetes; ordered lipid panel and A1C to assess cardiovascular risk and current diabetic status.  Investigated potential causes of hand and foot symptoms, including neuropathy secondary to possible diabetes or vitamin deficiencies.    PLAN SUMMARY:  - Ordered comprehensive labs: CBC, B12,  magnesium, A1C, thyroid function, cholesterol, liver function, kidney function, lipid panel, and urinalysis  - Prescribed HCTZ for swelling, to be taken in the morning  - Increased Losartan from 50 mg to 100 mg daily  - Continued current Synthroid 125 mcg daily dosage  - Referred patient to orthopedist for evaluation  - Recommend obtaining new work boots and appropriate footwear/inserts  - Advised patient to reduce sodium intake and watch carbohydrate consumption  - Suggested incorporating regular walking into daily routine  - Follow-up to review lab results and potentially adjust medication    ## ESSENTIAL (PRIMARY) HYPERTENSION:  - Blood pressure is uncontrolled at 162/100, which may contribute to weakness and paresthesia, numbness, and tingling in hands.  - Increased Losartan from 50 mg to 100 mg daily and added HCTZ to be taken in the morning for better control and to avoid nocturia.  - Explained importance of sodium restriction in managing hypertension, recommending patient reduce sodium intake, particularly in snacks and by using low-sodium alternatives.  - Also instructed to limit caffeine intake.  - Ordered labs to check kidney function due to hypertension.    ## HYPOTHYROIDISM:  - Tato is currently taking Synthroid 125 mcg daily.  - Continued current dosage and ordered thyroid function tests as it has been a while since last labs were done.  - Will review results and potentially adjust medication.  - Instructed to wait for lab results before switching to generic levothyroxine due to potential differences in thyroid levels.  - Will also assess if hypothyroidism is a contributing factor to patient's weakness.    ## PREDIABETES:  - Documented history of pre-diabetes related to weight with elevated BMI.  - Ordered A1C to determine if patient is still pre-diabetic or has progressed to diabetes.  - Advised patient to watch carbohydrate intake and maintain a healthy diet to manage this condition.    ## LOCALIZED  EDEMA:  - Observed swelling in legs and socks, indicating edema possibly related to uncontrolled blood pressure.  - Prescribed HCTZ to address swelling, advising morning administration to avoid nocturia.  - Will monitor edema with follow-up visits.      ## FATIGUE AND WEAKNESS:  - Tato reports feeling f  atigued and weak all the time with difficulty climbing stairs and equipment.  - Ordered comprehensive labs including CBC, B12, magnesium, A1C, thyroid function, cholesterol, liver function, and kidney function to evaluate the cause of these symptoms.    ## PERSONAL HISTORY OF URINARY CALCULI:  - Documented history of kidney stones.  - Ordered renal function tests and urinalysis for monitoring.      ## FOLLOW-UP:  - Discussed need for fasting before labs to ensure accurate results.  - Ordered lipid panel and liver function tests in addition to previously mentioned labs.        Encounter to establish care    Essential (primary) hypertension  -     losartan (COZAAR) 100 MG tablet; Take 1 tablet (100 mg total) by mouth once daily.  Dispense: 90 tablet; Refill: 0  -     CBC Auto Differential; Future; Expected date: 05/30/2025  -     Comprehensive Metabolic Panel; Future; Expected date: 05/30/2025  -     Lipid Panel; Future; Expected date: 05/30/2025  -     Magnesium; Future; Expected date: 05/30/2025  -     Microalbumin/Creatinine Ratio, Urine; Future; Expected date: 05/30/2025  -     hydroCHLOROthiazide 12.5 MG Tab; Take 1 tablet (12.5 mg total) by mouth once daily.  Dispense: 90 tablet; Refill: 0    Thyroid disease  -     TSH; Future; Expected date: 05/30/2025  -     T4, Free; Future; Expected date: 05/30/2025    Kidney stones    Prediabetes  -     Hemoglobin A1C; Future; Expected date: 05/30/2025    Class 2 severe obesity due to excess calories with serious comorbidity and body mass index (BMI) of 36.0 to 36.9 in adult    Fatigue, unspecified type    Paresthesia  -     Magnesium; Future; Expected date: 05/30/2025  -      Vitamin B12; Future; Expected date: 05/30/2025        Follow up in about 1 month (around 6/30/2025) for HTN, LABS.      5/30/2025 ESTEFANIA Ellison, GISEL  This note was generated with the assistance of ambient listening technology. Verbal consent was obtained by the patient and accompanying visitor(s) for the recording of patient appointment to facilitate this note. I attest to having reviewed and edited the generated note for accuracy, though some syntax or spelling errors may persist. Please contact the author of this note for any clarification.              [1]   Current Outpatient Medications   Medication Sig Dispense Refill    levothyroxine (SYNTHROID) 125 MCG tablet Take 125 mcg by mouth before breakfast.      hydroCHLOROthiazide 12.5 MG Tab Take 1 tablet (12.5 mg total) by mouth once daily. 90 tablet 0    losartan (COZAAR) 100 MG tablet Take 1 tablet (100 mg total) by mouth once daily. 90 tablet 0     No current facility-administered medications for this visit.

## 2025-06-18 ENCOUNTER — TELEPHONE (OUTPATIENT)
Dept: FAMILY MEDICINE | Facility: CLINIC | Age: 60
End: 2025-06-18
Payer: COMMERCIAL

## 2025-06-20 ENCOUNTER — TELEPHONE (OUTPATIENT)
Dept: FAMILY MEDICINE | Facility: CLINIC | Age: 60
End: 2025-06-20
Payer: COMMERCIAL

## 2025-06-20 ENCOUNTER — HOSPITAL ENCOUNTER (OUTPATIENT)
Facility: HOSPITAL | Age: 60
Discharge: HOME | End: 2025-06-22
Attending: STUDENT IN AN ORGANIZED HEALTH CARE EDUCATION/TRAINING PROGRAM | Admitting: INTERNAL MEDICINE
Payer: COMMERCIAL

## 2025-06-20 DIAGNOSIS — I10 ESSENTIAL (PRIMARY) HYPERTENSION: ICD-10-CM

## 2025-06-20 DIAGNOSIS — R29.818 ACUTE FOCAL NEUROLOGICAL DEFICIT: ICD-10-CM

## 2025-06-20 DIAGNOSIS — R29.90 STROKE-LIKE SYMPTOMS: Primary | ICD-10-CM

## 2025-06-20 LAB
ABSOLUTE EOSINOPHIL (SMH): 0.11 K/UL
ABSOLUTE MONOCYTE (SMH): 0.58 K/UL (ref 0.3–1)
ABSOLUTE NEUTROPHIL COUNT (SMH): 2.9 K/UL (ref 1.8–7.7)
ALBUMIN SERPL-MCNC: 4.6 G/DL (ref 3.5–5.2)
ALP SERPL-CCNC: 54 UNIT/L (ref 55–135)
ALT SERPL-CCNC: 43 UNIT/L (ref 10–44)
ANION GAP (SMH): 8 MMOL/L (ref 8–16)
AST SERPL-CCNC: 27 UNIT/L (ref 10–40)
BASOPHILS # BLD AUTO: 0.06 K/UL
BASOPHILS NFR BLD AUTO: 1.1 %
BILIRUB SERPL-MCNC: 0.7 MG/DL (ref 0.1–1)
BUN SERPL-MCNC: 17 MG/DL (ref 6–20)
CALCIUM SERPL-MCNC: 9.4 MG/DL (ref 8.7–10.5)
CHLORIDE SERPL-SCNC: 101 MMOL/L (ref 95–110)
CHOLEST SERPL-MCNC: 134 MG/DL (ref 120–199)
CHOLEST/HDLC SERPL: 3.5 {RATIO} (ref 2–5)
CO2 SERPL-SCNC: 29 MMOL/L (ref 23–29)
CREAT SERPL-MCNC: 1 MG/DL (ref 0.5–1.4)
CREAT SERPL-MCNC: 1.1 MG/DL (ref 0.5–1.4)
ERYTHROCYTE [DISTWIDTH] IN BLOOD BY AUTOMATED COUNT: 12.9 % (ref 11.5–14.5)
GFR SERPLBLD CREATININE-BSD FMLA CKD-EPI: >60 ML/MIN/1.73/M2
GLUCOSE SERPL-MCNC: 104 MG/DL (ref 70–110)
HCT VFR BLD AUTO: 44.1 % (ref 40–54)
HDLC SERPL-MCNC: 38 MG/DL (ref 40–75)
HDLC SERPL: 28.4 % (ref 20–50)
HGB BLD-MCNC: 15.6 GM/DL (ref 14–18)
IMM GRANULOCYTES # BLD AUTO: 0.02 K/UL (ref 0–0.04)
IMM GRANULOCYTES NFR BLD AUTO: 0.4 % (ref 0–0.5)
INR PPP: 1 (ref 0.8–1.2)
LDLC SERPL CALC-MCNC: 81.2 MG/DL (ref 63–159)
LYMPHOCYTES # BLD AUTO: 1.88 K/UL (ref 1–4.8)
MCH RBC QN AUTO: 33.5 PG (ref 27–31)
MCHC RBC AUTO-ENTMCNC: 35.4 G/DL (ref 32–36)
MCV RBC AUTO: 95 FL (ref 82–98)
NONHDLC SERPL-MCNC: 96 MG/DL
NUCLEATED RBC (/100WBC) (SMH): 0 /100 WBC
OHS QRS DURATION: 104 MS
OHS QTC CALCULATION: 445 MS
PLATELET # BLD AUTO: 219 K/UL (ref 150–450)
PMV BLD AUTO: 10.4 FL (ref 9.2–12.9)
POCT GLUCOSE: 99 MG/DL (ref 70–110)
POTASSIUM SERPL-SCNC: 3.8 MMOL/L (ref 3.5–5.1)
PROT SERPL-MCNC: 7.4 GM/DL (ref 6–8.4)
PROTHROMBIN TIME: 11.4 SECONDS (ref 9–12.5)
RBC # BLD AUTO: 4.66 M/UL (ref 4.6–6.2)
RELATIVE EOSINOPHIL (SMH): 2 % (ref 0–8)
RELATIVE LYMPHOCYTE (SMH): 34.1 % (ref 18–48)
RELATIVE MONOCYTE (SMH): 10.5 % (ref 4–15)
RELATIVE NEUTROPHIL (SMH): 51.9 % (ref 38–73)
SAMPLE: NORMAL
SODIUM SERPL-SCNC: 138 MMOL/L (ref 136–145)
TRIGL SERPL-MCNC: 74 MG/DL (ref 30–150)
TSH SERPL-ACNC: 0.85 UIU/ML (ref 0.34–5.6)
WBC # BLD AUTO: 5.52 K/UL (ref 3.9–12.7)

## 2025-06-20 PROCEDURE — 96361 HYDRATE IV INFUSION ADD-ON: CPT

## 2025-06-20 PROCEDURE — 85025 COMPLETE CBC W/AUTO DIFF WBC: CPT | Performed by: STUDENT IN AN ORGANIZED HEALTH CARE EDUCATION/TRAINING PROGRAM

## 2025-06-20 PROCEDURE — 25000003 PHARM REV CODE 250: Performed by: STUDENT IN AN ORGANIZED HEALTH CARE EDUCATION/TRAINING PROGRAM

## 2025-06-20 PROCEDURE — 85610 PROTHROMBIN TIME: CPT | Performed by: STUDENT IN AN ORGANIZED HEALTH CARE EDUCATION/TRAINING PROGRAM

## 2025-06-20 PROCEDURE — 99285 EMERGENCY DEPT VISIT HI MDM: CPT | Mod: 25

## 2025-06-20 PROCEDURE — 99900035 HC TECH TIME PER 15 MIN (STAT)

## 2025-06-20 PROCEDURE — 36415 COLL VENOUS BLD VENIPUNCTURE: CPT | Performed by: STUDENT IN AN ORGANIZED HEALTH CARE EDUCATION/TRAINING PROGRAM

## 2025-06-20 PROCEDURE — 94761 N-INVAS EAR/PLS OXIMETRY MLT: CPT

## 2025-06-20 PROCEDURE — 84443 ASSAY THYROID STIM HORMONE: CPT | Performed by: STUDENT IN AN ORGANIZED HEALTH CARE EDUCATION/TRAINING PROGRAM

## 2025-06-20 PROCEDURE — G0378 HOSPITAL OBSERVATION PER HR: HCPCS

## 2025-06-20 PROCEDURE — 99284 EMERGENCY DEPT VISIT MOD MDM: CPT | Mod: GT,,, | Performed by: STUDENT IN AN ORGANIZED HEALTH CARE EDUCATION/TRAINING PROGRAM

## 2025-06-20 PROCEDURE — 80061 LIPID PANEL: CPT | Performed by: STUDENT IN AN ORGANIZED HEALTH CARE EDUCATION/TRAINING PROGRAM

## 2025-06-20 PROCEDURE — 96372 THER/PROPH/DIAG INJ SC/IM: CPT

## 2025-06-20 PROCEDURE — 82962 GLUCOSE BLOOD TEST: CPT

## 2025-06-20 PROCEDURE — 93005 ELECTROCARDIOGRAM TRACING: CPT | Performed by: INTERNAL MEDICINE

## 2025-06-20 PROCEDURE — 80053 COMPREHEN METABOLIC PANEL: CPT | Performed by: STUDENT IN AN ORGANIZED HEALTH CARE EDUCATION/TRAINING PROGRAM

## 2025-06-20 PROCEDURE — 93010 ELECTROCARDIOGRAM REPORT: CPT | Mod: ,,, | Performed by: INTERNAL MEDICINE

## 2025-06-20 PROCEDURE — 25000003 PHARM REV CODE 250

## 2025-06-20 PROCEDURE — 99900031 HC PATIENT EDUCATION (STAT)

## 2025-06-20 PROCEDURE — 63600175 PHARM REV CODE 636 W HCPCS: Performed by: STUDENT IN AN ORGANIZED HEALTH CARE EDUCATION/TRAINING PROGRAM

## 2025-06-20 PROCEDURE — 96374 THER/PROPH/DIAG INJ IV PUSH: CPT

## 2025-06-20 PROCEDURE — 63600175 PHARM REV CODE 636 W HCPCS

## 2025-06-20 PROCEDURE — 25500020 PHARM REV CODE 255: Performed by: STUDENT IN AN ORGANIZED HEALTH CARE EDUCATION/TRAINING PROGRAM

## 2025-06-20 RX ORDER — FAMOTIDINE 20 MG/1
20 TABLET, FILM COATED ORAL 2 TIMES DAILY
Status: DISCONTINUED | OUTPATIENT
Start: 2025-06-20 | End: 2025-06-22 | Stop reason: HOSPADM

## 2025-06-20 RX ORDER — CLOPIDOGREL BISULFATE 75 MG/1
300 TABLET ORAL
Status: COMPLETED | OUTPATIENT
Start: 2025-06-20 | End: 2025-06-20

## 2025-06-20 RX ORDER — ONDANSETRON HYDROCHLORIDE 2 MG/ML
4 INJECTION, SOLUTION INTRAVENOUS EVERY 6 HOURS PRN
Status: DISCONTINUED | OUTPATIENT
Start: 2025-06-20 | End: 2025-06-22 | Stop reason: HOSPADM

## 2025-06-20 RX ORDER — ATORVASTATIN CALCIUM 40 MG/1
40 TABLET, FILM COATED ORAL DAILY
Status: DISCONTINUED | OUTPATIENT
Start: 2025-06-20 | End: 2025-06-22 | Stop reason: HOSPADM

## 2025-06-20 RX ORDER — ASPIRIN 81 MG/1
81 TABLET ORAL DAILY
Status: DISCONTINUED | OUTPATIENT
Start: 2025-06-21 | End: 2025-06-22 | Stop reason: HOSPADM

## 2025-06-20 RX ORDER — CLOPIDOGREL BISULFATE 75 MG/1
75 TABLET ORAL DAILY
Status: DISCONTINUED | OUTPATIENT
Start: 2025-06-21 | End: 2025-06-22 | Stop reason: HOSPADM

## 2025-06-20 RX ORDER — SODIUM CHLORIDE 0.9 % (FLUSH) 0.9 %
10 SYRINGE (ML) INJECTION ONCE
Status: DISCONTINUED | OUTPATIENT
Start: 2025-06-20 | End: 2025-06-20

## 2025-06-20 RX ORDER — SODIUM CHLORIDE 0.9 % (FLUSH) 0.9 %
10 SYRINGE (ML) INJECTION
Status: DISCONTINUED | OUTPATIENT
Start: 2025-06-20 | End: 2025-06-20 | Stop reason: SDUPTHER

## 2025-06-20 RX ORDER — ENOXAPARIN SODIUM 100 MG/ML
40 INJECTION SUBCUTANEOUS EVERY 24 HOURS
Status: DISCONTINUED | OUTPATIENT
Start: 2025-06-20 | End: 2025-06-22 | Stop reason: HOSPADM

## 2025-06-20 RX ORDER — ACETAMINOPHEN 325 MG/1
650 TABLET ORAL EVERY 6 HOURS PRN
Status: DISCONTINUED | OUTPATIENT
Start: 2025-06-20 | End: 2025-06-22 | Stop reason: HOSPADM

## 2025-06-20 RX ORDER — BISACODYL 10 MG/1
10 SUPPOSITORY RECTAL DAILY PRN
Status: DISCONTINUED | OUTPATIENT
Start: 2025-06-20 | End: 2025-06-22

## 2025-06-20 RX ORDER — LABETALOL HYDROCHLORIDE 5 MG/ML
10 INJECTION, SOLUTION INTRAVENOUS
Status: DISCONTINUED | OUTPATIENT
Start: 2025-06-20 | End: 2025-06-22 | Stop reason: HOSPADM

## 2025-06-20 RX ORDER — SODIUM CHLORIDE 0.9 % (FLUSH) 0.9 %
10 SYRINGE (ML) INJECTION
Status: DISCONTINUED | OUTPATIENT
Start: 2025-06-20 | End: 2025-06-22 | Stop reason: HOSPADM

## 2025-06-20 RX ORDER — HYDROCHLOROTHIAZIDE 12.5 MG/1
12.5 TABLET ORAL DAILY
Status: DISCONTINUED | OUTPATIENT
Start: 2025-06-21 | End: 2025-06-21

## 2025-06-20 RX ORDER — LOSARTAN POTASSIUM 50 MG/1
100 TABLET ORAL DAILY
Status: DISCONTINUED | OUTPATIENT
Start: 2025-06-21 | End: 2025-06-21

## 2025-06-20 RX ORDER — ASPIRIN 325 MG
325 TABLET ORAL
Status: COMPLETED | OUTPATIENT
Start: 2025-06-20 | End: 2025-06-20

## 2025-06-20 RX ADMIN — ENOXAPARIN SODIUM 40 MG: 40 INJECTION SUBCUTANEOUS at 04:06

## 2025-06-20 RX ADMIN — CLOPIDOGREL BISULFATE 300 MG: 75 TABLET, FILM COATED ORAL at 01:06

## 2025-06-20 RX ADMIN — ATORVASTATIN CALCIUM 40 MG: 40 TABLET, FILM COATED ORAL at 04:06

## 2025-06-20 RX ADMIN — ASPIRIN 325 MG ORAL TABLET 325 MG: 325 PILL ORAL at 01:06

## 2025-06-20 RX ADMIN — ONDANSETRON 4 MG: 2 INJECTION INTRAMUSCULAR; INTRAVENOUS at 03:06

## 2025-06-20 RX ADMIN — FAMOTIDINE 20 MG: 20 TABLET, FILM COATED ORAL at 08:06

## 2025-06-20 RX ADMIN — SODIUM CHLORIDE, POTASSIUM CHLORIDE, SODIUM LACTATE AND CALCIUM CHLORIDE 1000 ML: 600; 310; 30; 20 INJECTION, SOLUTION INTRAVENOUS at 11:06

## 2025-06-20 RX ADMIN — IOHEXOL 100 ML: 350 INJECTION, SOLUTION INTRAVENOUS at 11:06

## 2025-06-20 NOTE — PHARMACY MED REC
"Admission Medication History     The home medication history was taken by David Stubbs.    You may go to "Admission" then "Reconcile Home Medications" tabs to review and/or act upon these items.     The home medication list has been updated by the Pharmacy department.   Please read ALL comments highlighted in yellow.   Please address this information as you see fit.    Feel free to contact us if you have any questions or require assistance.        Medications listed below were obtained from: Patient/family and Analytic software- "[employer]  No current facility-administered medications on file prior to encounter.     Current Outpatient Medications on File Prior to Encounter   Medication Sig Dispense Refill    hydroCHLOROthiazide 12.5 MG Tab Take 1 tablet (12.5 mg total) by mouth once daily. 90 tablet 0    levothyroxine (SYNTHROID) 125 MCG tablet Take 125 mcg by mouth before breakfast.      losartan (COZAAR) 100 MG tablet Take 1 tablet (100 mg total) by mouth once daily. 90 tablet 0       David Stubbs  EXT 1922                .          "

## 2025-06-20 NOTE — ASSESSMENT & PLAN NOTE
Antithrombotics for secondary stroke prevention: Antiplatelets: Aspirin: 81 mg daily  Clopidogrel: 75 mg daily    Statins for secondary stroke prevention and hyperlipidemia, if present:   Statins: Atorvastatin- 40 mg daily    Aggressive risk factor modification: HTN, Obesity     Rehab efforts: The patient has been evaluated by a stroke team provider and the therapy needs have been fully considered based off the presenting complaints and exam findings. The following therapy evaluations are needed: PT evaluate and treat, OT evaluate and treat, SLP evaluate and treat    Diagnostics ordered/pending: CT scan of head without contrast to asses brain parenchyma, CTA Head to assess vasculature , CTA Neck/Arch to assess vasculature, HgbA1C to assess blood glucose levels, Lipid Profile to assess cholesterol levels, MRI head without contrast to assess brain parenchyma, TTE to assess cardiac function/status , TSH to assess thyroid function    VTE prophylaxis: Enoxaparin 40 mg SQ every 24 hours    BP parameters: TIA: SBP <220 until imaging confirmation of no infarct     Neuro checks  Orthostatic VS  Consult neurology

## 2025-06-20 NOTE — SUBJECTIVE & OBJECTIVE
HPI:  59 y.o. male with a PMHx significant for HTN, prediabetes, thyroid disease presenting with dizziness and gait instability. LKN around 0700 this morning. He reports that he feels like he's going to pass out, feels like he's drunk.      Vitals:    06/20/25 1050   BP: (!) 178/91   Pulse: 70   Resp: 18   Temp: 98.2 °F (36.8 °C)     BG 99    Images personally reviewed and interpreted:  Study: Head CT and CTA Head & Neck  Study Interpretation: No acute intracranial hemorrhage. No LVO on CTA.      Assessment and plan:  # Vertigo  - Peripheral vs central/ischemic stroke (though no associated focal neurologic deficits on exam). Plan to defer IV thrombolysis given low NIHSS, mild symptoms. No LVO on CTA. Plan for follow-up MRI to evaluate.    Lytics recommendation: Thrombolytic therapy not recommended due to Mild Non-Disabling Symptoms, low NIHSS  Thrombectomy recommendation: No; No large vessel occlusion identified on imaging   Placement recommendation: pending further studies     - Load aspirin 325mg, clopidogrel 300mg x1 now while undergoing work-up.   - Recommend follow-up MRI brain without contrast to evaluate for acute ischemia. If positive for acute infarction, recommend admission for stroke work-up including TTE with bubble, lipid panel, hemoglobin A1c, PT/OT/SLP as indicated. If negative for acute infarction with resolution of symptoms, can consider work-up for TIA (inpatient vs outpatient).   - Allow for permissive HTN, SBP < 220, until MRI is completed.   - If above studies are abnormal, please load images to teleneurology imaging system and contact us to review.      Please contact us with any further questions or concerns or if patient has any acute neurological changes (new symptoms, worsening deficits).

## 2025-06-20 NOTE — H&P
[facility] - [place] - Emergency Dept  Hospital Medicine  History & Physical    Patient Name: Tato Weaver  MRN: [id number]  Patient Class: OP- Observation  Admission Date: 6/20/2025  Attending Physician: Krissy Collier MD   Primary Care Provider: Lainey Hayden NP         Patient information was obtained from patient, past medical records, and ER records.     Subjective:     Principal Problem:Stroke-like symptoms    Chief Complaint:   Chief Complaint   Patient presents with    Dizziness     Dizziness started at 7 am. Bilateral leg weakness        HPI: 59-year-old male presented to ED for eval of stroke-like symptoms. pMHx HTN, thyroid disease.  Patient reported around 7:00 a.m. this morning he had sudden onset dizziness lightheadedness when going from seated to standing position at a work meeting, with associated difficulty ambulating and diplopia.  Patient stated when ambulating it feels like he is drunk.  Patient denies other focal neuro deficits.  Denies history of stroke.  Denies history of similar symptoms.  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  In ED today, CTH impression with normal CT appearance of the brain. CTA H/N impression with no clinically significant stenosis  and no LVO. Tele stroke consult done in ED, TNK not recommended 2/2 mild non disabling symptoms, recs MRI brain and stroke/TIA work-up.  Patient loaded with aspirin and Plavix in ED. MRI brain pending. Admit to hospital medicine for further eval.      Past Medical History:   Diagnosis Date    Essential (primary) hypertension     Kidney stones     Thyroid disease        Past Surgical History:   Procedure Laterality Date    CYSTOSCOPY WITH URETEROSCOPY,  RETROGRADE PYELOGRAPHY, AND INSERTION OF STENT Left 11/2/2022    Procedure: CYSTOSCOPY, WITH RETROGRADE PYELOGRAM AND URETERAL STENT INSERTION;  Surgeon: Dayana An MD;  Location: [facility] OR;  Service: Urology;  Laterality: Left;    CYSTOURETEROSCOPY,WITH HOLMIUM LASER LITHOTRIPSY OF URETERAL CALCULUS Left 12/14/2022    Procedure: CYSTOURETEROSCOPY,WITH HOLMIUM LASER LITHOTRIPSY OF URETERAL CALCULUS;  Surgeon: Dayana An MD;  Location: [facility] OR;  Service: Urology;  Laterality: Left;    HERNIA REPAIR      kidney stones      KNEE ARTHROSCOPY W/ LASER      LASER LITHOTRIPSY Left 11/2/2022    Procedure: LITHOTRIPSY, USING LASER;  Surgeon: Dayana An MD;  Location: [facility] OR;  Service: Urology;  Laterality: Left;    RETROGRADE PYELOGRAPHY Left 10/11/2022    Procedure: PYELOGRAM, RETROGRADE;  Surgeon: Dayana An MD;  Location: [facility] OR;  Service: Urology;  Laterality: Left;    URETERAL STENT PLACEMENT N/A 10/11/2022    Procedure: INSERTION, STENT, URETER;  Surgeon: Dayana An MD;  Location: [facility] OR;  Service: Urology;  Laterality: N/A;    URETEROSCOPIC REMOVAL OF URETERIC CALCULUS Left 11/2/2022    Procedure: REMOVAL, CALCULUS, URETER, URETEROSCOPIC;  Surgeon: Dayana An MD;  Location: [facility] OR;  Service: Urology;  Laterality: Left;       Review of patient's allergies indicates:   Allergen Reactions    Lisinopril      Cough       No current facility-administered medications on file prior to encounter.     Current Outpatient Medications on File Prior to Encounter   Medication Sig    hydroCHLOROthiazide 12.5 MG Tab Take 1 tablet (12.5 mg total) by mouth once daily.    levothyroxine (SYNTHROID) 125 MCG tablet Take 125 mcg by mouth before breakfast.    losartan (COZAAR) 100 MG tablet Take 1 tablet (100 mg total) by mouth once daily.     Family History       Problem Relation (Age of Onset)    Cancer Father, Sister    Heart attack Sister    No Known  Problems Mother          Tobacco Use    Smoking status: Former     Current packs/day: 0.00     Average packs/day: 1 pack/day for 31.0 years (31.0 ttl pk-yrs)     Types: Cigarettes     Start date: 1979     Quit date: 2010     Years since quitting: 15.4    Smokeless tobacco: Never   Substance and Sexual Activity    Alcohol use: Yes    Drug use: Never    Sexual activity: Not on file     Review of Systems   Constitutional:  Negative for fever.   HENT:  Negative for congestion and sore throat.    Eyes:  Positive for visual disturbance.   Respiratory:  Negative for shortness of breath.    Cardiovascular:  Negative for chest pain.   Gastrointestinal:  Negative for abdominal pain, constipation, diarrhea, nausea and vomiting.   Genitourinary:  Negative for flank pain.   Musculoskeletal:  Positive for gait problem. Negative for myalgias.   Neurological:  Positive for dizziness and light-headedness. Negative for seizures, syncope, speech difficulty, weakness and numbness.   Psychiatric/Behavioral:  Negative for confusion.      Objective:     Vital Signs (Most Recent):  Temp: 98.2 °F (36.8 °C) (06/20/25 1050)  Pulse: 61 (06/20/25 1131)  Resp: 16 (06/20/25 1132)  BP: 134/85 (06/20/25 1131)  SpO2: 98 % (06/20/25 1131) Vital Signs (24h Range):  Temp:  [98.2 °F (36.8 °C)] 98.2 °F (36.8 °C)  Pulse:  [61-78] 61  Resp:  [16-18] 16  SpO2:  [98 %-99 %] 98 %  BP: (134-188)/(85-98) 134/85     Weight: 120.2 kg (265 lb)  Body mass index is 36.96 kg/m².     Physical Exam  Vitals reviewed.   Constitutional:       General: He is not in acute distress.  HENT:      Head: Normocephalic and atraumatic.      Nose: Nose normal.      Mouth/Throat:      Mouth: Mucous membranes are moist.   Eyes:      Conjunctiva/sclera: Conjunctivae normal.   Cardiovascular:      Rate and Rhythm: Normal rate and regular rhythm.   Pulmonary:      Effort: Pulmonary effort is normal. No respiratory distress.      Breath sounds: Normal breath sounds.   Abdominal:       "General: Bowel sounds are normal.      Palpations: Abdomen is soft.      Tenderness: There is no abdominal tenderness.   Musculoskeletal:         General: Normal range of motion.      Cervical back: Normal range of motion.      Right lower leg: No edema.      Left lower leg: No edema.   Skin:     General: Skin is warm and dry.   Neurological:      Mental Status: He is alert and oriented to person, place, and time.      Sensory: No sensory deficit.      Coordination: Coordination normal.      Gait: Gait abnormal.   Psychiatric:         Mood and Affect: Mood normal.                Significant Labs: All pertinent labs within the past 24 hours have been reviewed.  Bilirubin:   Recent Labs   Lab 06/20/25  1058   BILITOT 0.7     CBC:   Recent Labs   Lab 06/20/25  1058   WBC 5.52   HGB 15.6   HCT 44.1        CMP:   Recent Labs   Lab 06/20/25  1058      K 3.8      CO2 29      BUN 17   CREATININE 1.0   CALCIUM 9.4   PROT 7.4   ALBUMIN 4.6   BILITOT 0.7   ALKPHOS 54*   AST 27   ALT 43   ANIONGAP 8     Cardiac Markers: No results for input(s): "CKMB", "MYOGLOBIN", "BNP", "TROPISTAT" in the last 48 hours.  Troponin: No results for input(s): "TROPONINI", "TROPONINIHS" in the last 48 hours.  TSH:   Recent Labs   Lab 06/20/25  1058   TSH 0.853     Urine Studies: No results for input(s): "COLORU", "APPEARANCEUA", "PHUR", "SPECGRAV", "PROTEINUA", "GLUCUA", "KETONESU", "BILIRUBINUA", "OCCULTUA", "NITRITE", "UROBILINOGEN", "LEUKOCYTESUR", "RBCUA", "WBCUA", "BACTERIA", "SQUAMEPITHEL", "HYALINECASTS" in the last 48 hours.    Invalid input(s): "WRIGHTSUR"    Significant Imaging: I have reviewed all pertinent imaging results/findings within the past 24 hours.  Assessment/Plan:     Assessment & Plan  Stroke-like symptoms    Antithrombotics for secondary stroke prevention: Antiplatelets: Aspirin: 81 mg daily  Clopidogrel: 75 mg daily    Statins for secondary stroke prevention and hyperlipidemia, if present: "   Statins: Atorvastatin- 40 mg daily    Aggressive risk factor modification: HTN, Obesity     Rehab efforts: The patient has been evaluated by a stroke team provider and the therapy needs have been fully considered based off the presenting complaints and exam findings. The following therapy evaluations are needed: PT evaluate and treat, OT evaluate and treat, SLP evaluate and treat    Diagnostics ordered/pending: CT scan of head without contrast to asses brain parenchyma, CTA Head to assess vasculature , CTA Neck/Arch to assess vasculature, HgbA1C to assess blood glucose levels, Lipid Profile to assess cholesterol levels, MRI head without contrast to assess brain parenchyma, TTE to assess cardiac function/status , TSH to assess thyroid function    VTE prophylaxis: Enoxaparin 40 mg SQ every 24 hours    BP parameters: TIA: SBP <220 until imaging confirmation of no infarct     Neuro checks  Orthostatic VS  Consult neurology       Essential (primary) hypertension  Patient's blood pressure range in the last 24 hours was: BP  Min: 134/85  Max: 188/98.The patient's inpatient anti-hypertensive regimen is listed below:  Current Antihypertensives  labetaloL injection 10 mg, Every 15 min PRN, Intravenous    Plan  - BP is controlled, no changes needed to their regimen    Thyroid disease  Resume home levothyroxine    VTE Risk Mitigation (From admission, onward)           Ordered     enoxaparin injection 40 mg  Daily         06/20/25 1355     IP VTE HIGH RISK PATIENT  Once         06/20/25 1355     Place sequential compression device  Until discontinued         06/20/25 1355                      **Review/resume home meds once reconciled**               On 06/20/2025, patient should be placed in hospital observation services under my care in collaboration with Dr. Collier.           Daria Escobar NP  Department of Hospital Medicine  Formerly [facility] - Emergency Dept

## 2025-06-20 NOTE — HPI
59-year-old male presented to ED for eval of stroke-like symptoms. pMHx HTN, thyroid disease.  Patient reported around 7:00 a.m. this morning he had sudden onset dizziness lightheadedness when going from seated to standing position at a work meeting, with associated difficulty ambulating and diplopia.  Patient stated when ambulating it feels like he is drunk.  Patient denies other focal neuro deficits.  Denies history of stroke.  Denies history of similar symptoms.  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  In ED today, CTH impression with normal CT appearance of the brain. CTA H/N impression with no clinically significant stenosis  and no LVO. Tele stroke consult done in ED, TNK not recommended 2/2 mild non disabling symptoms, recs MRI brain and stroke/TIA work-up.  Patient loaded with aspirin and Plavix in ED. MRI brain pending. Admit to hospital medicine for further eval.

## 2025-06-20 NOTE — ED PROVIDER NOTES
Encounter Date: 6/20/2025       History     Chief Complaint   Patient presents with    Dizziness     Dizziness started at 7 am. Bilateral leg weakness     HPI    Mr. Weaver is a 59-year-old male with history of hypertension presenting with dizziness and gait unsteadiness that started at 7:00 a.m..  States he got up from the chair at a meeting at work when he felt unsteady and almost fell forward.  Symptoms were acute onset with the associated dizziness and room spinning.  Reports some nausea without vomiting.  Denies changes in vision, weakness or numbness.  No similar symptoms in the past.  Reports nonadherence to blood pressure medication.  Is not on blood thinner.    Review of patient's allergies indicates:   Allergen Reactions    Lisinopril      Cough     Past Medical History:   Diagnosis Date    Essential (primary) hypertension     Kidney stones     Thyroid disease      Past Surgical History:   Procedure Laterality Date    CYSTOSCOPY WITH URETEROSCOPY, RETROGRADE PYELOGRAPHY, AND INSERTION OF STENT Left 11/2/2022    Procedure: CYSTOSCOPY, WITH RETROGRADE PYELOGRAM AND URETERAL STENT INSERTION;  Surgeon: Dayana An MD;  Location: [facility] OR;  Service: Urology;  Laterality: Left;    CYSTOURETEROSCOPY,WITH HOLMIUM LASER LITHOTRIPSY OF URETERAL CALCULUS Left 12/14/2022    Procedure: CYSTOURETEROSCOPY,WITH HOLMIUM LASER LITHOTRIPSY OF URETERAL CALCULUS;  Surgeon: Dayana An MD;  Location: [facility] OR;  Service: Urology;  Laterality: Left;    HERNIA REPAIR      kidney stones      KNEE ARTHROSCOPY W/ LASER      LASER LITHOTRIPSY Left 11/2/2022    Procedure: LITHOTRIPSY, USING LASER;  Surgeon: Dayana An MD;  Location: [facility] OR;  Service: Urology;  Laterality: Left;    RETROGRADE PYELOGRAPHY Left 10/11/2022    Procedure: PYELOGRAM, RETROGRADE;  Surgeon: Dayana An MD;  Location: [facility] OR;  Service: Urology;  Laterality: Left;    URETERAL STENT PLACEMENT N/A 10/11/2022     Procedure: INSERTION, STENT, URETER;  Surgeon: Dayana An MD;  Location: [facility] OR;  Service: Urology;  Laterality: N/A;    URETEROSCOPIC REMOVAL OF URETERIC CALCULUS Left 11/2/2022    Procedure: REMOVAL, CALCULUS, URETER, URETEROSCOPIC;  Surgeon: Dayana An MD;  Location: [facility] OR;  Service: Urology;  Laterality: Left;     Family History   Problem Relation Name Age of Onset    No Known Problems Mother      Cancer Father      Cancer Sister      Heart attack Sister       Social History[1]  Review of Systems    As noted above    Physical Exam     Initial Vitals [06/20/25 1050]   BP Pulse Resp Temp SpO2   (!) 178/91 70 18 98.2 °F (36.8 °C) 98 %      MAP       --         Physical Exam    Constitutional: He appears well-developed and well-nourished. He is not diaphoretic. No distress.   HENT:   Head: Normocephalic and atraumatic.   Eyes: Conjunctivae and EOM are normal. Pupils are equal, round, and reactive to light.   No nystagmus   Cardiovascular:  Normal rate.           No murmur heard.  Pulmonary/Chest: Breath sounds normal. No respiratory distress. He has no wheezes. He has no rales.   Abdominal: Abdomen is soft. He exhibits no distension. There is no abdominal tenderness.   Musculoskeletal:         General: Normal range of motion.     Neurological: He is alert and oriented to person, place, and time. He has normal strength. No cranial nerve deficit or sensory deficit.   Upon standing patient is unsteady in seems to stumble forward.  Finger-to-nose testing normal.  No dysdiadochokinesis abnormality.  Heel to shin also normal.  Normal strength and sensation.  No cranial nerve deficits.         ED Course   Procedures  Labs Reviewed   COMPREHENSIVE METABOLIC PANEL - Abnormal       Result Value    Sodium 138      Potassium 3.8      Chloride 101      CO2 29      Glucose 104      BUN 17      Creatinine 1.0      Calcium 9.4      Protein Total 7.4      Albumin 4.6      Bilirubin Total 0.7      ALP  54 (*)     AST 27      ALT 43      Anion Gap 8      eGFR >60     LIPID PANEL - Abnormal    Cholesterol Total 134      Triglyceride 74      HDL Cholesterol 38 (*)     LDL Cholesterol 81.2      HDL/Cholesterol Ratio 28.4      Cholesterol/HDL Ratio 3.5      Non HDL Cholesterol 96     CBC WITH DIFFERENTIAL - Abnormal    WBC 5.52      RBC 4.66      Hgb 15.6      Hct 44.1      MCV 95      MCH 33.5 (*)     MCHC 35.4      RDW 12.9      Platelet Count 219      MPV 10.4      Nucleated RBC 0      Neut % 51.9      Lymph % 34.1      Mono % 10.5      Eos % 2.0      Basophil % 1.1      Imm Grans % 0.4      Neut # 2.9      Lymph # 1.88      Mono # 0.58      Eos # 0.11      Baso # 0.06      Imm Grans # 0.02     PROTIME-INR - Normal    PT 11.4      INR 1.0     TSH - Normal    TSH 0.853     CBC W/ AUTO DIFFERENTIAL    Narrative:     The following orders were created for panel order CBC W/ AUTO DIFFERENTIAL.  Procedure                               Abnormality         Status                     ---------                               -----------         ------                     CBC with Differential[[id number]]       Abnormal            Final result                 Please view results for these tests on the individual orders.   EXTRA TUBES   POCT GLUCOSE    POCT Glucose 99     ISTAT CREATININE    POC Creatinine 1.1      Sample VENOUS     POCT GLUCOSE MONITORING CONTINUOUS          Imaging Results              MRI Brain Without Contrast (In process)  Result time 06/20/25 12:41:24                     CTA Head and Neck (xpd) (Final result)  Result time 06/20/25 11:11:58      Final result by Andrei Farah MD (06/20/25 11:11:58)                   Impression:      No clinically significant stenosis, large vessel occlusion, or aneurysm is identified as outlined above.      Electronically signed by: Andrei Farah  Date:    06/20/2025  Time:    11:11               Narrative:    CLINICAL HISTORY:  ([id number] y/o  (1965)  M    Neuro deficit, acute, stroke suspected;    TECHNIQUE:  (A#[id number], exam time 6/20/2025 11:09)    CTA HEAD AND NECK (XPD) [id number]    CT angiography of the head and neck was performed using thin axial slices respectively and reviewed in multiple planes. Two and three dimensional multiplanar reformatted images were generated and submitted to PACS.    POST PROCESSING:    (Vitrea) 3D advanced post-processing was performed by the interpreting physician using an independent workstation utilizing a combination of MIP and MPR techniques to better evaluate anatomy and disease process. 3D rendering was performed with physician participation and supervision.    CONTRAST:    100  mL Omni 350 was injected intravenously.    CMS MANDATED QUALITY DATA - CT RADIATION - 436    All CT scans at this facility utilize dose modulation, iterative reconstruction, and/or weight based dosing when appropriate to reduce radiation dose to as low as reasonably achievable.    CMS MANDATED QUALITY DATA - CAROTID - 195    All measurements and percent stenosis described below were determined using NASCET criteria or criteria similar to NASCET, as defined by the Society of Radiologists in Ultrasound Consensus Conference, Radiology, 2003    COMPARISON:  None available.    FINDINGS:  CTA of the [place]: There is patency of the intracranial circulation including the bilateral internal carotid arteries, the carotid terminus, the A1 and M1 segments, the bilateral intracranial vertebral arteries, the basilar artery and its distal branches. No aneurysm is identified within the limits of the technique.  Both posterior communicating arteries appear patent.  Conventional angiography is more sensitive for the detection of small aneurysms.    CTA of the Neck:    There is a 2 vessel (bovine).  CTA of the neck demonstrates that the origins of the great vessels are widely patent. No aneurysm is seen.    RIGHT:    Common carotid artery origin:  Patent.    Cervical segment: Patent.    External carotid origin characteristics: Patent.    Carotid bifurcation: Patent.    Internal carotid origin characteristics: No focal stenosis.    Vertebral artery: within normal limits.    LEFT    Common carotid artery origin: Patent.    Cervical segment: Patent.    External carotid origin characteristics: Patent.    Carotid bifurcation: Minimal calcified plaque is seen in the left bulb/proximal left internal carotid artery with no measurable stenosis.    Internal carotid origin characteristics: No focal stenosis.    Vertebral artery: within normal limits.                                       CT HEAD FOR CODE STROKE (Final result)  Result time 06/20/25 11:03:17      Final result by Luis Enrique Cervantes MD (06/20/25 11:03:17)                   Impression:      1. Normal CT appearance of the brain.  Findings were called to Dr. Solomon at 11:00 on June 20, 2025.      Electronically signed by: Luis Enrique Cervantes  Date:    06/20/2025  Time:    11:03               Narrative:    EXAMINATION:  CT HEAD FOR CODE STROKE    CLINICAL HISTORY:  Neuro deficit, acute, stroke suspected;.    TECHNIQUE:  Thin section axial images were obtained.  No contrast was administered.    COMPARISON:  None.    FINDINGS:  There is no evidence of intracranial mass, hemorrhage, or midline shift.  The ventricles and sulci are within normal limits.  There are no pathologic extra-axial fluid collections.    There is no evidence of ischemic change or edema.  Cerebellum and brainstem are unremarkable.    The calvarium is intact.  Small mucous retention cysts or polyps are incidentally noted in both maxillary sinuses.                                       Medications   aspirin tablet 325 mg (has no administration in time range)   clopidogreL tablet 300 mg (has no administration in time range)   iohexoL (OMNIPAQUE 350) injection 100 mL (100 mLs Intravenous Given 6/20/25 1103)   lactated ringers bolus 1,000 mL (1,000  mLs Intravenous New Bag 6/20/25 1130)     Medical Decision Making  59-year-old male presenting with acute onset gait unsteadiness and dizziness.  Time of onset 7:00 a.m..  Vital signs here stable with blood pressure 178/91.  Patient's stroke activated.  Glucose 99.  NIH stroke scale of 0.  Tele neurology was consulted in recommended that he was not tPA candidate given stroke scale of 0 and minimal symptoms.  Patient loaded with aspirin and Plavix.  Lab work reviewed without any significant abnormality.  Patient admitted to Hospital Medicine for further workup of possible posterior stroke.     Greater than 30 minutes of critical care time was spent caring for this patient and coordinating care for possible stroke.    Vin Solomon MD  Emergency Medicine      Amount and/or Complexity of Data Reviewed  Labs: ordered.  Radiology: ordered.    Risk  OTC drugs.  Prescription drug management.               ED Course as of 06/20/25 1247   Fri Jun 20, 2025 1137 EKG interpreted by me with normal sinus rhythm, normal axis, normal intervals, no significant ST elevation or depression. [KH]      ED Course User Index  [KH] Vin Solomon MD                           Clinical Impression:  Final diagnoses:  [R29.818] Acute focal neurological deficit                       [1]   Social History  Tobacco Use    Smoking status: Former     Current packs/day: 0.00     Average packs/day: 1 pack/day for 31.0 years (31.0 ttl pk-yrs)     Types: Cigarettes     Start date: 1979     Quit date: 2010     Years since quitting: 15.4    Smokeless tobacco: Never   Substance Use Topics    Alcohol use: Yes    Drug use: Never        Vin Solomon MD  06/20/25 3700

## 2025-06-20 NOTE — TELEPHONE ENCOUNTER
Returned call to patient and advised him to go to the emergency room to be evaluated because we had no available openings. Verbal understanding was expressed.

## 2025-06-20 NOTE — TELEMEDICINE CONSULT
[facility]  Vascular Neurology  Comprehensive Stroke Center  TeleVascular Neurology Acute Consultation Note        Consult Information  Consult to Telemedicine - Acute Stroke  Consult performed by: Jennifer Brewer MD  Consult ordered by: Jovanny Vivar MD          Consulting Provider: JOVANNY VIVAR   Current Providers  No providers found    Patient Location:  [facility] EMERGENCY DEPARTMENT Emergency Department    Spoke hospital nurse at bedside with patient assisting consultant.  Patient information was obtained from patient.       Stroke Documentation  Acute Stroke Times   Last Known Normal Date: 06/20/25  Last Known Normal Time: 0700  Symptom Onset Date: 06/20/25  Symptom Onset Time: 0700  Stroke Team Called Date: 06/20/25  Stroke Team Called Time: 1057  Stroke Team Arrival Date: 06/20/25  Stroke Team Arrival Time: 1057  CT Interpretation Time: 1106  Thrombolytic Therapy Recommended: No    NIH Scale:  Interval: baseline  1a. Level of Consciousness: 0-->Alert, keenly responsive  1b. LOC Questions: 0-->Answers both questions correctly  1c. LOC Commands: 0-->Performs both tasks correctly  2. Best Gaze: 0-->Normal  3. Visual: 0-->No visual loss  4. Facial Palsy: 0-->Normal symmetrical movements  5a. Motor Arm, Left: 0-->No drift, limb holds 90 (or 45) degrees for full 10 secs  5b. Motor Arm, Right: 0-->No drift, limb holds 90 (or 45) degrees for full 10 secs  6a. Motor Leg, Left: 0-->No drift, leg holds 30 degree position for full 5 secs  6b. Motor Leg, Right: 0-->No drift, leg holds 30 degree position for full 5 secs  7. Limb Ataxia: 0-->Absent  8. Sensory: 0-->Normal, no sensory loss  9. Best Language: 0-->No aphasia, normal  10. Dysarthria: 0-->Normal  11. Extinction and Inattention (formerly Neglect): 0-->No abnormality  Total (NIH Stroke Scale): 0      Modified [place] Baseline: Score: 0  Modified [place] Discharge:    [place] Coma Scale:     ABCD2 Score:    [id number] Score:    HAS -BLED  "Score:    ICH Score:    Hunt & Munguia Classification:      Blood pressure (!) 178/91, pulse 70, temperature 98.2 °F (36.8 °C), resp. rate 18, height 5' 11" (1.803 m), weight 120.2 kg (265 lb), SpO2 98%.      In my opinion, this was a: Tier 1; VAN Stroke Assessment: Negative     Medical Decision Making  HPI:  59 y.o. male with a PMHx significant for HTN, prediabetes, thyroid disease presenting with dizziness and gait instability. LKN around 0700 this morning. He reports that he feels like he's going to pass out, feels like he's drunk.      Vitals:    06/20/25 1050   BP: (!) 178/91   Pulse: 70   Resp: 18   Temp: 98.2 °F (36.8 °C)     BG 99    Images personally reviewed and interpreted:  Study: Head CT and CTA Head & Neck  Study Interpretation: No acute intracranial hemorrhage. No LVO on CTA.      Assessment and plan:  # Vertigo  - Peripheral vs central/ischemic stroke (though no associated focal neurologic deficits on exam). Plan to defer IV thrombolysis given low NIHSS, mild symptoms. No LVO on CTA. Plan for follow-up MRI to evaluate.    Lytics recommendation: Thrombolytic therapy not recommended due to Mild Non-Disabling Symptoms, low NIHSS  Thrombectomy recommendation: No; No large vessel occlusion identified on imaging   Placement recommendation: pending further studies     - Load aspirin 325mg, clopidogrel 300mg x1 now while undergoing work-up.   - Recommend follow-up MRI brain without contrast to evaluate for acute ischemia. If positive for acute infarction, recommend admission for stroke work-up including TTE with bubble, lipid panel, hemoglobin A1c, PT/OT/SLP as indicated. If negative for acute infarction with resolution of symptoms, can consider work-up for TIA (inpatient vs outpatient).   - Allow for permissive HTN, SBP < 220, until MRI is completed.   - If above studies are abnormal, please load images to [employer] imaging system and contact us to review.      Please contact us with any further questions " or concerns or if patient has any acute neurological changes (new symptoms, worsening deficits).              ROS  Physical Exam  Past Medical History:   Diagnosis Date    Essential (primary) hypertension     Kidney stones     Thyroid disease      Past Surgical History:   Procedure Laterality Date    CYSTOSCOPY WITH URETEROSCOPY, RETROGRADE PYELOGRAPHY, AND INSERTION OF STENT Left 11/2/2022    Procedure: CYSTOSCOPY, WITH RETROGRADE PYELOGRAM AND URETERAL STENT INSERTION;  Surgeon: Dayana An MD;  Location: [facility] OR;  Service: Urology;  Laterality: Left;    CYSTOURETEROSCOPY,WITH HOLMIUM LASER LITHOTRIPSY OF URETERAL CALCULUS Left 12/14/2022    Procedure: CYSTOURETEROSCOPY,WITH HOLMIUM LASER LITHOTRIPSY OF URETERAL CALCULUS;  Surgeon: Dayana An MD;  Location: [facility] OR;  Service: Urology;  Laterality: Left;    HERNIA REPAIR      kidney stones      KNEE ARTHROSCOPY W/ LASER      LASER LITHOTRIPSY Left 11/2/2022    Procedure: LITHOTRIPSY, USING LASER;  Surgeon: Dayana An MD;  Location: [facility] OR;  Service: Urology;  Laterality: Left;    RETROGRADE PYELOGRAPHY Left 10/11/2022    Procedure: PYELOGRAM, RETROGRADE;  Surgeon: Dayana An MD;  Location: [facility] OR;  Service: Urology;  Laterality: Left;    URETERAL STENT PLACEMENT N/A 10/11/2022    Procedure: INSERTION, STENT, URETER;  Surgeon: Dayana An MD;  Location: [facility] OR;  Service: Urology;  Laterality: N/A;    URETEROSCOPIC REMOVAL OF URETERIC CALCULUS Left 11/2/2022    Procedure: REMOVAL, CALCULUS, URETER, URETEROSCOPIC;  Surgeon: Dayana An MD;  Location: [facility] OR;  Service: Urology;  Laterality: Left;     Family History   Problem Relation Name Age of Onset    No Known Problems Mother      Cancer Father      Cancer Sister      Heart attack Sister         Diagnoses  Dizziness/Imbalance    Jennifer Brewer MD      Emergent/Acute neurological consultation requested by spoke provider due to critical  concerns for possible cerebrovascular event that could result in permanent loss of neurologic/bodily function, severe disability or death of this patient.  Immediate/timely evaluation by a highly prepared expert is paramount for optimal outcomes  High risk for neurological deterioration if not properly diagnosed  High risk for neurological deterioration if not treated promplty/as soon as possible  Complex diagnostic evaluation may be required (advanced imaging)  High risk treatment options (thrombolytics and/or thrombectomy)    Patient care was coordinated with spoke provider, including but not limted to    Discussing likely diagnosis/etiology of symptoms  Making recommendations for further diagnostic studies  Making recommendations for intravenous thrombolytics or other advanced therapies  Making recommendations for disposition (admission/transfer for higher level of care)      Neurology consultation requested by spoke provider. Audiovisual encounter with the patient performed using a secure connection.  Results and impressions from the visit are documented on this note and were communicated to the consulting provider/team via direct communication. The note has been shared for addition to the patients electronic medical record.

## 2025-06-20 NOTE — TELEPHONE ENCOUNTER
----- Message from Herminia sent at 6/20/2025  9:30 AM CDT -----  Pt called to state that he believes his BP is dropping. Pt stated that he is seeing double, feeling woozy when getting up, and other concerns. Pt would like to come in today or know what to do.    [phone]

## 2025-06-20 NOTE — PLAN OF CARE
[facility] - Emergency Dept  Initial Discharge Assessment       Primary Care Provider: Lainey Hayden NP    Admission Diagnosis: Stroke-like symptoms [R29.90]    Admission Date: 6/20/2025  Expected Discharge Date:     Transition of Care Barriers: None    Assessment completed at bedside with patient and spouse.  Patient lives with spouse, is independent with ADL's. Denies HH/DME/HD/Coumadin.   PCP Lainey Hayden.  Pharmacy confirmed.  Spouse to provide transportation on discharge.       Payor: BLUE CROSS BLUE SHIELD / Plan: BCBS ALL OUT OF STATE / Product Type: PPO /     Extended Emergency Contact Information  Primary Emergency Contact: Kyler Villarreal  Address: [address] [place] [address] [place]  Home Phone: [phone]  Mobile Phone: [phone]  Relation: Spouse   needed? No    Discharge Plan A: Home with family  Discharge Plan B: Home with family      [id number] [place] - [address]  [place] [place] [facility] [address]  Phone: [phone] Fax: [phone]      Initial Assessment (most recent)       Adult Discharge Assessment - 06/20/25 1448          Discharge Assessment    Assessment Type Discharge Planning Assessment     Confirmed/corrected address, phone number and insurance Yes     Confirmed Demographics Correct on Facesheet     Source of Information patient;family     Communicated STU with patient/caregiver Date not available/Unable to determine     People in Home spouse     Name(s) of People in Home Kyler Villarreal (spouse) [phone]     Do you expect to return to your current living situation? Yes     Do you have help at home or someone to help you manage your care at home? Yes     Who are your caregiver(s) and their phone number(s)? spouse     Prior to hospitilization cognitive status: Alert/Oriented     Current cognitive status: Alert/Oriented     Walking or Climbing Stairs Difficulty no     Dressing/Bathing  Difficulty no     Equipment Currently Used at Home none     Readmission within 30 days? No     Patient currently being followed by outpatient case management? No     Do you currently have service(s) that help you manage your care at home? No     Do you take prescription medications? Yes     Do you have prescription coverage? Yes     Do you have any problems affording any of your prescribed medications? Yes     If yes, what medications? synthroid - requests generic     Is the patient taking medications as prescribed? yes     Who is going to help you get home at discharge? spouse     How do you get to doctors appointments? car, drives self     Are you on dialysis? No     Do you take coumadin? No     Discharge Plan A Home with family     Discharge Plan B Home with family     DME Needed Upon Discharge  none     Discharge Plan discussed with: Spouse/sig other;Patient     Transition of Care Barriers None

## 2025-06-20 NOTE — ASSESSMENT & PLAN NOTE
Patient's blood pressure range in the last 24 hours was: BP  Min: 134/85  Max: 188/98.The patient's inpatient anti-hypertensive regimen is listed below:  Current Antihypertensives  labetaloL injection 10 mg, Every 15 min PRN, Intravenous    Plan  - BP is controlled, no changes needed to their regimen

## 2025-06-20 NOTE — SUBJECTIVE & OBJECTIVE
Past Medical History:   Diagnosis Date    Essential (primary) hypertension     Kidney stones     Thyroid disease        Past Surgical History:   Procedure Laterality Date    CYSTOSCOPY WITH URETEROSCOPY, RETROGRADE PYELOGRAPHY, AND INSERTION OF STENT Left 11/2/2022    Procedure: CYSTOSCOPY, WITH RETROGRADE PYELOGRAM AND URETERAL STENT INSERTION;  Surgeon: Dayana An MD;  Location: [facility] OR;  Service: Urology;  Laterality: Left;    CYSTOURETEROSCOPY,WITH HOLMIUM LASER LITHOTRIPSY OF URETERAL CALCULUS Left 12/14/2022    Procedure: CYSTOURETEROSCOPY,WITH HOLMIUM LASER LITHOTRIPSY OF URETERAL CALCULUS;  Surgeon: Dayana An MD;  Location: [facility] OR;  Service: Urology;  Laterality: Left;    HERNIA REPAIR      kidney stones      KNEE ARTHROSCOPY W/ LASER      LASER LITHOTRIPSY Left 11/2/2022    Procedure: LITHOTRIPSY, USING LASER;  Surgeon: Dayana An MD;  Location: [facility] OR;  Service: Urology;  Laterality: Left;    RETROGRADE PYELOGRAPHY Left 10/11/2022    Procedure: PYELOGRAM, RETROGRADE;  Surgeon: Dayana An MD;  Location: [facility] OR;  Service: Urology;  Laterality: Left;    URETERAL STENT PLACEMENT N/A 10/11/2022    Procedure: INSERTION, STENT, URETER;  Surgeon: Dayana An MD;  Location: [facility] OR;  Service: Urology;  Laterality: N/A;    URETEROSCOPIC REMOVAL OF URETERIC CALCULUS Left 11/2/2022    Procedure: REMOVAL, CALCULUS, URETER, URETEROSCOPIC;  Surgeon: Dayana An MD;  Location: [facility] OR;  Service: Urology;  Laterality: Left;       Review of patient's allergies indicates:   Allergen Reactions    Lisinopril      Cough       No current facility-administered medications on file prior to encounter.     Current Outpatient Medications on File Prior to Encounter   Medication Sig    hydroCHLOROthiazide 12.5 MG Tab Take 1 tablet (12.5 mg total) by mouth once daily.    levothyroxine (SYNTHROID) 125 MCG tablet Take 125 mcg by mouth before breakfast.     losartan (COZAAR) 100 MG tablet Take 1 tablet (100 mg total) by mouth once daily.     Family History       Problem Relation (Age of Onset)    Cancer Father, Sister    Heart attack Sister    No Known Problems Mother          Tobacco Use    Smoking status: Former     Current packs/day: 0.00     Average packs/day: 1 pack/day for 31.0 years (31.0 ttl pk-yrs)     Types: Cigarettes     Start date: 1979     Quit date: 2010     Years since quitting: 15.4    Smokeless tobacco: Never   Substance and Sexual Activity    Alcohol use: Yes    Drug use: Never    Sexual activity: Not on file     Review of Systems   Constitutional:  Negative for fever.   HENT:  Negative for congestion and sore throat.    Eyes:  Positive for visual disturbance.   Respiratory:  Negative for shortness of breath.    Cardiovascular:  Negative for chest pain.   Gastrointestinal:  Negative for abdominal pain, constipation, diarrhea, nausea and vomiting.   Genitourinary:  Negative for flank pain.   Musculoskeletal:  Positive for gait problem. Negative for myalgias.   Neurological:  Positive for dizziness and light-headedness. Negative for seizures, syncope, speech difficulty, weakness and numbness.   Psychiatric/Behavioral:  Negative for confusion.      Objective:     Vital Signs (Most Recent):  Temp: 98.2 °F (36.8 °C) (06/20/25 1050)  Pulse: 61 (06/20/25 1131)  Resp: 16 (06/20/25 1132)  BP: 134/85 (06/20/25 1131)  SpO2: 98 % (06/20/25 1131) Vital Signs (24h Range):  Temp:  [98.2 °F (36.8 °C)] 98.2 °F (36.8 °C)  Pulse:  [61-78] 61  Resp:  [16-18] 16  SpO2:  [98 %-99 %] 98 %  BP: (134-188)/(85-98) 134/85     Weight: 120.2 kg (265 lb)  Body mass index is 36.96 kg/m².     Physical Exam  Vitals reviewed.   Constitutional:       General: He is not in acute distress.  HENT:      Head: Normocephalic and atraumatic.      Nose: Nose normal.      Mouth/Throat:      Mouth: Mucous membranes are moist.   Eyes:      Conjunctiva/sclera: Conjunctivae normal.  "  Cardiovascular:      Rate and Rhythm: Normal rate and regular rhythm.   Pulmonary:      Effort: Pulmonary effort is normal. No respiratory distress.      Breath sounds: Normal breath sounds.   Abdominal:      General: Bowel sounds are normal.      Palpations: Abdomen is soft.      Tenderness: There is no abdominal tenderness.   Musculoskeletal:         General: Normal range of motion.      Cervical back: Normal range of motion.      Right lower leg: No edema.      Left lower leg: No edema.   Skin:     General: Skin is warm and dry.   Neurological:      Mental Status: He is alert and oriented to person, place, and time.      Sensory: No sensory deficit.      Coordination: Coordination normal.      Gait: Gait abnormal.   Psychiatric:         Mood and Affect: Mood normal.                Significant Labs: All pertinent labs within the past 24 hours have been reviewed.  Bilirubin:   Recent Labs   Lab 06/20/25  1058   BILITOT 0.7     CBC:   Recent Labs   Lab 06/20/25  1058   WBC 5.52   HGB 15.6   HCT 44.1        CMP:   Recent Labs   Lab 06/20/25  1058      K 3.8      CO2 29      BUN 17   CREATININE 1.0   CALCIUM 9.4   PROT 7.4   ALBUMIN 4.6   BILITOT 0.7   ALKPHOS 54*   AST 27   ALT 43   ANIONGAP 8     Cardiac Markers: No results for input(s): "CKMB", "MYOGLOBIN", "BNP", "TROPISTAT" in the last 48 hours.  Troponin: No results for input(s): "TROPONINI", "TROPONINIHS" in the last 48 hours.  TSH:   Recent Labs   Lab 06/20/25  1058   TSH 0.853     Urine Studies: No results for input(s): "COLORU", "APPEARANCEUA", "PHUR", "SPECGRAV", "PROTEINUA", "GLUCUA", "KETONESU", "BILIRUBINUA", "OCCULTUA", "NITRITE", "UROBILINOGEN", "LEUKOCYTESUR", "RBCUA", "WBCUA", "BACTERIA", "SQUAMEPITHEL", "HYALINECASTS" in the last 48 hours.    Invalid input(s): "WRIGHTSUR"    Significant Imaging: I have reviewed all pertinent imaging results/findings within the past 24 hours.  "

## 2025-06-21 ENCOUNTER — CLINICAL SUPPORT (OUTPATIENT)
Dept: CARDIOLOGY | Facility: HOSPITAL | Age: 60
End: 2025-06-21
Attending: STUDENT IN AN ORGANIZED HEALTH CARE EDUCATION/TRAINING PROGRAM
Payer: COMMERCIAL

## 2025-06-21 VITALS — WEIGHT: 264 LBS | HEIGHT: 71 IN | BODY MASS INDEX: 36.96 KG/M2

## 2025-06-21 LAB
ABSOLUTE EOSINOPHIL (SMH): 0.21 K/UL
ABSOLUTE MONOCYTE (SMH): 0.5 K/UL (ref 0.3–1)
ABSOLUTE NEUTROPHIL COUNT (SMH): 2.4 K/UL (ref 1.8–7.7)
ALBUMIN SERPL-MCNC: 4 G/DL (ref 3.5–5.2)
ALP SERPL-CCNC: 53 UNIT/L (ref 55–135)
ALT SERPL-CCNC: 39 UNIT/L (ref 10–44)
ANION GAP (SMH): 7 MMOL/L (ref 8–16)
AORTIC ROOT ANNULUS: 3.6 CM
AORTIC SIZE INDEX (SOV): 1.6 CM/M2
AORTIC SIZE INDEX: 1.5 CM/M2
AORTIC VALVE CUSP SEPERATION: 2 CM
APICAL FOUR CHAMBER EJECTION FRACTION: 53 %
APICAL TWO CHAMBER EJECTION FRACTION: 54 %
APTT PPP: 29.6 SECONDS (ref 21–32)
ASCENDING AORTA: 3.6 CM
AST SERPL-CCNC: 23 UNIT/L (ref 10–40)
AV INDEX (PROSTH): 0.83
AV MEAN GRADIENT: 4 MMHG
AV PEAK GRADIENT: 8 MMHG
AV VALVE AREA BY VELOCITY RATIO: 3 CM²
AV VALVE AREA: 3.2 CM²
AV VELOCITY RATIO: 0.79
BASOPHILS # BLD AUTO: 0.06 K/UL
BASOPHILS NFR BLD AUTO: 1.2 %
BILIRUB SERPL-MCNC: 0.4 MG/DL (ref 0.1–1)
BSA FOR ECHO PROCEDURE: 2.45 M2
BUN SERPL-MCNC: 16 MG/DL (ref 6–20)
CALCIUM SERPL-MCNC: 8.8 MG/DL (ref 8.7–10.5)
CHLORIDE SERPL-SCNC: 105 MMOL/L (ref 95–110)
CO2 SERPL-SCNC: 28 MMOL/L (ref 23–29)
CREAT SERPL-MCNC: 0.9 MG/DL (ref 0.5–1.4)
CV ECHO LV RWT: 0.4 CM
DOP CALC AO PEAK VEL: 1.4 M/S
DOP CALC AO VTI: 28.9 CM
DOP CALC LVOT AREA: 3.8 CM2
DOP CALC LVOT DIAMETER: 2.2 CM
DOP CALC LVOT PEAK VEL: 1.1 M/S
DOP CALC LVOT STROKE VOLUME: 91.2 CM3
DOP CALC MV VTI: 39.1 CM
DOP CALCLVOT PEAK VEL VTI: 24 CM
E WAVE DECELERATION TIME: 261 MSEC
E/A RATIO: 0.82
E/E' RATIO: 11 M/S
EAG (SMH): 128 MG/DL (ref 68–131)
ECHO LV POSTERIOR WALL: 0.9 CM (ref 0.6–1.1)
ERYTHROCYTE [DISTWIDTH] IN BLOOD BY AUTOMATED COUNT: 12.8 % (ref 11.5–14.5)
FRACTIONAL SHORTENING: 26.7 % (ref 28–44)
GFR SERPLBLD CREATININE-BSD FMLA CKD-EPI: >60 ML/MIN/1.73/M2
GLUCOSE SERPL-MCNC: 126 MG/DL (ref 70–110)
HBA1C MFR BLD: 6.1 % (ref 4.5–6.2)
HCT VFR BLD AUTO: 41.9 % (ref 40–54)
HGB BLD-MCNC: 14.8 GM/DL (ref 14–18)
HR MV ECHO: 63 BPM
IMM GRANULOCYTES # BLD AUTO: 0.01 K/UL (ref 0–0.04)
IMM GRANULOCYTES NFR BLD AUTO: 0.2 % (ref 0–0.5)
INR PPP: 1 (ref 0.8–1.2)
INTERVENTRICULAR SEPTUM: 0.9 CM (ref 0.6–1.1)
IVC DIAMETER: 1.3 CM
LA MAJOR: 5.2 CM
LA MINOR: 3.9 CM
LA WIDTH: 3.6 CM
LEFT ATRIUM AREA SYSTOLIC (APICAL 2 CHAMBER): 17.7 CM2
LEFT ATRIUM AREA SYSTOLIC (APICAL 4 CHAMBER): 17.6 CM2
LEFT ATRIUM SIZE: 3.2 CM
LEFT ATRIUM VOLUME INDEX MOD: 22 ML/M2
LEFT ATRIUM VOLUME INDEX: 18 ML/M2
LEFT ATRIUM VOLUME MOD: 51 ML
LEFT ATRIUM VOLUME: 44 CM3
LEFT INTERNAL DIMENSION IN SYSTOLE: 3.3 CM (ref 2.1–4)
LEFT VENTRICLE DIASTOLIC VOLUME INDEX: 38.82 ML/M2
LEFT VENTRICLE DIASTOLIC VOLUME: 92 ML
LEFT VENTRICLE END DIASTOLIC VOLUME APICAL 2 CHAMBER: 109 ML
LEFT VENTRICLE END DIASTOLIC VOLUME APICAL 4 CHAMBER: 111 ML
LEFT VENTRICLE END SYSTOLIC VOLUME APICAL 2 CHAMBER: 50 ML
LEFT VENTRICLE END SYSTOLIC VOLUME APICAL 4 CHAMBER: 49.1 ML
LEFT VENTRICLE MASS INDEX: 56 G/M2
LEFT VENTRICLE SYSTOLIC VOLUME INDEX: 18.6 ML/M2
LEFT VENTRICLE SYSTOLIC VOLUME: 44 ML
LEFT VENTRICULAR INTERNAL DIMENSION IN DIASTOLE: 4.5 CM (ref 3.5–6)
LEFT VENTRICULAR MASS: 132.8 G
LV LATERAL E/E' RATIO: 9.3 M/S
LV SEPTAL E/E' RATIO: 12 M/S
LVED V (TEICH): 92.4 ML
LVES V (TEICH): 44.1 ML
LVOT MG: 3 MMHG
LVOT MV: 0.73 CM/S
LYMPHOCYTES # BLD AUTO: 1.8 K/UL (ref 1–4.8)
MAGNESIUM SERPL-MCNC: 2 MG/DL (ref 1.6–2.6)
MCH RBC QN AUTO: 33.7 PG (ref 27–31)
MCHC RBC AUTO-ENTMCNC: 35.3 G/DL (ref 32–36)
MCV RBC AUTO: 95 FL (ref 82–98)
MV MEAN GRADIENT: 1 MMHG
MV PEAK A VEL: 1.02 M/S
MV PEAK E VEL: 0.84 M/S
MV PEAK GRADIENT: 4 MMHG
MV STENOSIS PRESSURE HALF TIME: 76 MS
MV VALVE AREA BY CONTINUITY EQUATION: 2.33 CM2
MV VALVE AREA P 1/2 METHOD: 2.89 CM2
NUCLEATED RBC (/100WBC) (SMH): 0 /100 WBC
OHS CV RV/LV RATIO: 0.64 CM
OHS LV EJECTION FRACTION SIMPSONS BIPLANE MOD: 53 %
PHOSPHATE SERPL-MCNC: 3.7 MG/DL (ref 2.7–4.5)
PLATELET # BLD AUTO: 179 K/UL (ref 150–450)
PMV BLD AUTO: 10.2 FL (ref 9.2–12.9)
POTASSIUM SERPL-SCNC: 3.7 MMOL/L (ref 3.5–5.1)
PROT SERPL-MCNC: 6.8 GM/DL (ref 6–8.4)
PROTHROMBIN TIME: 11.3 SECONDS (ref 9–12.5)
PV MV: 0.75 M/S
PV PEAK GRADIENT: 3 MMHG
PV PEAK VELOCITY: 0.92 M/S
RA MAJOR: 5 CM
RA PRESSURE ESTIMATED: 3 MMHG
RA WIDTH: 3 CM
RBC # BLD AUTO: 4.39 M/UL (ref 4.6–6.2)
RELATIVE EOSINOPHIL (SMH): 4.3 % (ref 0–8)
RELATIVE LYMPHOCYTE (SMH): 36.4 % (ref 18–48)
RELATIVE MONOCYTE (SMH): 10.1 % (ref 4–15)
RELATIVE NEUTROPHIL (SMH): 47.8 % (ref 38–73)
RIGHT ATRIUM END SYSTOLIC VOLUME APICAL 4 CHAMBER INDEX BSA: 12.49 ML/M2
RIGHT ATRIUM VOLUME AREA LENGTH APICAL 4 CHAMBER: 29.6 ML
RIGHT VENTRICLE DIASTOLIC BASEL DIMENSION: 2.9 CM
RIGHT VENTRICLE DIASTOLIC LENGTH: 7.1 CM
RIGHT VENTRICLE DIASTOLIC MID DIMENSION: 2.5 CM
RIGHT VENTRICULAR END-DIASTOLIC DIMENSION: 2.9 CM
RIGHT VENTRICULAR LENGTH IN DIASTOLE (APICAL 4-CHAMBER VIEW): 7.1 CM
RV MID DIAMA: 2.5 CM
RV TISSUE DOPPLER FREE WALL SYSTOLIC VELOCITY 1 (APICAL 4 CHAMBER VIEW): 12.6 CM/S
SINUS: 3.8 CM
SODIUM SERPL-SCNC: 140 MMOL/L (ref 136–145)
STJ: 3.6 CM
T4 FREE SERPL-MCNC: 1.19 NG/DL (ref 0.71–1.51)
TDI LATERAL: 0.09 M/S
TDI SEPTAL: 0.07 M/S
TDI: 0.08 M/S
TRICUSPID ANNULAR PLANE SYSTOLIC EXCURSION: 2.9 CM
VIT B12 SERPL-MCNC: 449 PG/ML (ref 210–950)
WBC # BLD AUTO: 4.94 K/UL (ref 3.9–12.7)
Z-SCORE OF LEFT VENTRICULAR DIMENSION IN END DIASTOLE: -8.23
Z-SCORE OF LEFT VENTRICULAR DIMENSION IN END SYSTOLE: -4.92

## 2025-06-21 PROCEDURE — 63600175 PHARM REV CODE 636 W HCPCS

## 2025-06-21 PROCEDURE — 85610 PROTHROMBIN TIME: CPT

## 2025-06-21 PROCEDURE — 93306 TTE W/DOPPLER COMPLETE: CPT

## 2025-06-21 PROCEDURE — 92523 SPEECH SOUND LANG COMPREHEN: CPT

## 2025-06-21 PROCEDURE — 92610 EVALUATE SWALLOWING FUNCTION: CPT

## 2025-06-21 PROCEDURE — 25000003 PHARM REV CODE 250

## 2025-06-21 PROCEDURE — 84439 ASSAY OF FREE THYROXINE: CPT | Performed by: NURSE PRACTITIONER

## 2025-06-21 PROCEDURE — 84100 ASSAY OF PHOSPHORUS: CPT

## 2025-06-21 PROCEDURE — G0378 HOSPITAL OBSERVATION PER HR: HCPCS

## 2025-06-21 PROCEDURE — 97161 PT EVAL LOW COMPLEX 20 MIN: CPT

## 2025-06-21 PROCEDURE — 93306 TTE W/DOPPLER COMPLETE: CPT | Mod: 26,,, | Performed by: STUDENT IN AN ORGANIZED HEALTH CARE EDUCATION/TRAINING PROGRAM

## 2025-06-21 PROCEDURE — 82607 VITAMIN B-12: CPT | Performed by: NURSE PRACTITIONER

## 2025-06-21 PROCEDURE — 94760 N-INVAS EAR/PLS OXIMETRY 1: CPT

## 2025-06-21 PROCEDURE — 83036 HEMOGLOBIN GLYCOSYLATED A1C: CPT

## 2025-06-21 PROCEDURE — 83735 ASSAY OF MAGNESIUM: CPT

## 2025-06-21 PROCEDURE — G0426 INPT/ED TELECONSULT50: HCPCS | Mod: GT,,, | Performed by: PSYCHIATRY & NEUROLOGY

## 2025-06-21 PROCEDURE — 80053 COMPREHEN METABOLIC PANEL: CPT

## 2025-06-21 PROCEDURE — 84425 ASSAY OF VITAMIN B-1: CPT | Performed by: NURSE PRACTITIONER

## 2025-06-21 PROCEDURE — 85730 THROMBOPLASTIN TIME PARTIAL: CPT

## 2025-06-21 PROCEDURE — 36415 COLL VENOUS BLD VENIPUNCTURE: CPT

## 2025-06-21 PROCEDURE — 36415 COLL VENOUS BLD VENIPUNCTURE: CPT | Performed by: NURSE PRACTITIONER

## 2025-06-21 PROCEDURE — 85025 COMPLETE CBC W/AUTO DIFF WBC: CPT

## 2025-06-21 PROCEDURE — 96372 THER/PROPH/DIAG INJ SC/IM: CPT

## 2025-06-21 PROCEDURE — 99900031 HC PATIENT EDUCATION (STAT)

## 2025-06-21 PROCEDURE — 25000003 PHARM REV CODE 250: Performed by: NURSE PRACTITIONER

## 2025-06-21 PROCEDURE — 97165 OT EVAL LOW COMPLEX 30 MIN: CPT

## 2025-06-21 RX ORDER — HYDROCHLOROTHIAZIDE 12.5 MG/1
12.5 TABLET ORAL DAILY
Status: DISCONTINUED | OUTPATIENT
Start: 2025-06-21 | End: 2025-06-22 | Stop reason: HOSPADM

## 2025-06-21 RX ORDER — LOSARTAN POTASSIUM 50 MG/1
100 TABLET ORAL DAILY
Status: DISCONTINUED | OUTPATIENT
Start: 2025-06-21 | End: 2025-06-22 | Stop reason: HOSPADM

## 2025-06-21 RX ORDER — HYDRALAZINE HYDROCHLORIDE 20 MG/ML
10 INJECTION INTRAMUSCULAR; INTRAVENOUS EVERY 6 HOURS PRN
Status: DISCONTINUED | OUTPATIENT
Start: 2025-06-21 | End: 2025-06-22 | Stop reason: HOSPADM

## 2025-06-21 RX ADMIN — ENOXAPARIN SODIUM 40 MG: 40 INJECTION SUBCUTANEOUS at 05:06

## 2025-06-21 RX ADMIN — HYDROCHLOROTHIAZIDE 12.5 MG: 12.5 TABLET ORAL at 02:06

## 2025-06-21 RX ADMIN — LEVOTHYROXINE SODIUM 125 MCG: 0.03 TABLET ORAL at 06:06

## 2025-06-21 RX ADMIN — FAMOTIDINE 20 MG: 20 TABLET, FILM COATED ORAL at 10:06

## 2025-06-21 RX ADMIN — ASPIRIN 81 MG: 81 TABLET ORAL at 10:06

## 2025-06-21 RX ADMIN — LOSARTAN POTASSIUM 100 MG: 50 TABLET, FILM COATED ORAL at 02:06

## 2025-06-21 RX ADMIN — CLOPIDOGREL 75 MG: 75 TABLET ORAL at 10:06

## 2025-06-21 RX ADMIN — FAMOTIDINE 20 MG: 20 TABLET, FILM COATED ORAL at 08:06

## 2025-06-21 RX ADMIN — ATORVASTATIN CALCIUM 40 MG: 40 TABLET, FILM COATED ORAL at 10:06

## 2025-06-21 NOTE — PT/OT/SLP EVAL
Physical Therapy Evaluation and Discharge Note    Patient Name:  Tato Weaver   MRN:  [id number]    Recommendations:     Discharge Recommendations: No Therapy Indicated  Discharge Equipment Recommendations: none   Barriers to discharge: None    Assessment:     Tato Weaver is a 59 y.o. male admitted with a medical diagnosis of Stroke-like symptoms. .  At this time, patient is functioning at their prior level of function and does not require further acute PT services.     Recent Surgery: * No surgery found *      Plan:     During this hospitalization, patient does not require further acute PT services.  Please re-consult if situation changes.      Subjective     Chief Complaint: none  Patient/Family Comments/goals: Return to work  Pain/Comfort:  Pain Rating 1: 0/10    Patients cultural, spiritual, Religion conflicts given the current situation:      Living Environment:  Pt lives at home with wife in [facility]  Prior to admission, patients level of function was independent .  Equipment used at home: none.  DME owned (not currently used): none.  Upon discharge, patient will have assistance from wife.    Objective:     Communicated with wife prior to session.  Patient found supine with telemetry upon PT entry to room.    General Precautions: Standard, fall    Orthopedic Precautions:    Braces:    Respiratory Status: Room air    Exams:  Cognitive Exam:  Patient is oriented to Person, Place, Time, and Situation  RLE ROM: WNL  RLE Strength: WNL  LLE ROM: WNL  LLE Strength: WNL    Functional Mobility:  Bed Mobility:     Supine to Sit: independence  Sit to Supine: independence  Transfers:     Sit to Stand:  independence with no AD  Gait: 200 no  AD    AM-PAC 6 CLICK MOBILITY  Total Score:24       Treatment and Education:    PT eval and DC at baseline level of function  AM-PAC 6 CLICK MOBILITY  Total Score:24     Patient left sitting edge of bed with all lines intact and call button in reach.    GOALS:    Multidisciplinary Problems       Physical Therapy Goals       Not on file                    DME Justifications:  No DME recommended requiring DME justifications    History:     Past Medical History:   Diagnosis Date    Essential (primary) hypertension     Kidney stones     Thyroid disease        Past Surgical History:   Procedure Laterality Date    CYSTOSCOPY WITH URETEROSCOPY, RETROGRADE PYELOGRAPHY, AND INSERTION OF STENT Left 11/2/2022    Procedure: CYSTOSCOPY, WITH RETROGRADE PYELOGRAM AND URETERAL STENT INSERTION;  Surgeon: Dayana An MD;  Location: [facility] OR;  Service: Urology;  Laterality: Left;    CYSTOURETEROSCOPY,WITH HOLMIUM LASER LITHOTRIPSY OF URETERAL CALCULUS Left 12/14/2022    Procedure: CYSTOURETEROSCOPY,WITH HOLMIUM LASER LITHOTRIPSY OF URETERAL CALCULUS;  Surgeon: Dayana An MD;  Location: [facility] OR;  Service: Urology;  Laterality: Left;    HERNIA REPAIR      kidney stones      KNEE ARTHROSCOPY W/ LASER      LASER LITHOTRIPSY Left 11/2/2022    Procedure: LITHOTRIPSY, USING LASER;  Surgeon: Dayana An MD;  Location: [facility] OR;  Service: Urology;  Laterality: Left;    RETROGRADE PYELOGRAPHY Left 10/11/2022    Procedure: PYELOGRAM, RETROGRADE;  Surgeon: Dayana An MD;  Location: [facility] OR;  Service: Urology;  Laterality: Left;    URETERAL STENT PLACEMENT N/A 10/11/2022    Procedure: INSERTION, STENT, URETER;  Surgeon: Dayana An MD;  Location: [facility] OR;  Service: Urology;  Laterality: N/A;    URETEROSCOPIC REMOVAL OF URETERIC CALCULUS Left 11/2/2022    Procedure: REMOVAL, CALCULUS, URETER, URETEROSCOPIC;  Surgeon: Dayana An MD;  Location: [facility] OR;  Service: Urology;  Laterality: Left;       Time Tracking:     PT Received On: 06/21/25  PT Start Time: 1108     PT Stop Time: 1122  PT Total Time (min): 14 min     Billable Minutes: Evaluation 14 minutes      06/21/2025

## 2025-06-21 NOTE — ASSESSMENT & PLAN NOTE
Patient's blood pressure range in the last 24 hours was: BP  Min: 124/79  Max: 188/98.The patient's inpatient anti-hypertensive regimen is listed below:  Current Antihypertensives  labetaloL injection 10 mg, Every 15 min PRN, Intravenous  hydroCHLOROthiazide tablet 12.5 mg, Daily, Oral  losartan tablet 100 mg, Daily, Oral    Plan  - BP is controlled, no changes needed to their regimen

## 2025-06-21 NOTE — CONSULTS
[facility]  Adult Nutrition  Education Short Note    Admit Date: 6/20/2025   Total duration of encounter: 1 day   Patient Age: 59 y.o.    Nutrition Education    Previous education: no    Diet at home: General    Written information provided and explained on General, Healthful Mediterranean Nutrition Therapy  diet.     Comments: Pt reports not following any diet restrictions at home. 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     Educated pt on heart healthy diet. Education focused on including healthy fats- gave examples and lowering LDL levels by excluding saturated/trans fat in the diet. Went over types of foods that have saturated/trans fat. Encouraged cooking with olive oil instead of butter. Choosing whole grains over refined grains, choosing canned foods with no salt added and plain frozen veggies instead of veggies cooked in butter and cream sauces. Encouraged patient to choose leaner cuts of meat and decreasing high fat meats such as casillas, sausage, hot dogs, etc. Educated pt on decreasing sodium in diet. To try not to cook with salt and use herbs and spices to make food more flavorful. To limit eating out-if patient chooses to eat out, informed patient to discuss with  to cook meats and veggies with no salt and olive oil instead of butter. Provided handouts on all of these topics for pt to use in the future. Also provided RD contact information for pt to call with future questions.     Discussed with: patient    Educational Need? yes    Barriers: none identified    Interventions: Sodium modified diet and Content related nutrition education    Patient and/or family comprehend instructions: yes    Outcome: Verbalizes understanding     Thanks for the consult!    Dayana Cintron RD 06/21/2025 8:44 AM

## 2025-06-21 NOTE — HOSPITAL COURSE
59-year-old male admitted for dizziness and lightheadedness.  Stroke workup was initiated.  MRI of brain negative.  CTA of head and neck without large vessel occlusion or significant carotid stenosis.  Echocardiogram was obtained revealed a negative bubble study, normal ejection fraction 55-60% with normal diastolic dysfunction no valvular abnormalities.  Neurology was consulted.  Feels that lightheadedness and dizziness could be attributed to TIA versus hypertensive encephalopathy.  Recommends aspirin and Plavix for 21 days then aspirin thereafter.  Also recommended statin.  Patient will be maintained on both.  Patient's blood pressure was controlled with losartan 100 mg daily, hydrochlorothiazide 12.5 mg daily and hydralazine 25 mg t.i.d..  We will continue all medications on discharge.  Patient's symptoms have resolved at this time.  We will discharge patient home with PCP follow up as well as follow up to sleep Medicine for evaluation of sleep apnea as patient has apneic periods while sleeping and has uncontrolled hypertension.  Patient will also be set up with Holter monitor outpatient and can follow up with Cardiology for results.  Return to ER for any concerning symptoms.    Patient was evaluated on day of discharge.  Is complaints have resolved.  He is no longer having a dizziness chest pain lightheadedness or any other concerns at this time.  Blood pressure is controlled with medication changes as stated above.

## 2025-06-21 NOTE — PT/OT/SLP EVAL
Speech Language Pathology Evaluation  Cognitive/Bedside Swallow    Patient Name:  Tato Weaver   MRN:  [id number]  Admitting Diagnosis: Stroke-like symptoms    Recommendations:                  General Recommendations:  Follow-up not indicated at this time; please re-consult if warranted.  Diet recommendations:  Other (Comment), Regular Diet - IDDSI Level 7 (small bites due to incomplete dentition), Thin   Aspiration Precautions: Feed only when awake/alert, HOB to 90 degrees, Meds whole 1 at a time, Remain upright 30 minutes post meal, Small bites/sips, and Standard aspiration precautions   General Precautions: Standard, fall, aspiration  Communication strategies:  none  Discharge recommendations:  No Therapy Indicated   Barriers to Discharge:  to be determined    Assessment:     Tato Weaver is a 59 y.o. male referred to SLP for assessment of cognitive communication and swallow.  Oral stage of swallow today during this evaluation visit was in functional limits with no overt sign of aspiration or pharyngeal dysphagia.  Pt denies change in swallow.  Cognitive communication also was in functional limits.  Pt reports being at baseline in both areas.  Spouse voiced no concerns in either area.  Speech pathology will not follow at this time.  Pt/spouse were educated regarding signs of dysphagia and swallow safety precautions.    History:     59 year old male admitted after acute onset stroke-like symptoms including dizziness.  MRI negative for acute abnormality.      Past Medical History:   Diagnosis Date    Essential (primary) hypertension     Kidney stones     Thyroid disease        Past Surgical History:   Procedure Laterality Date    CYSTOSCOPY WITH URETEROSCOPY, RETROGRADE PYELOGRAPHY, AND INSERTION OF STENT Left 11/2/2022    Procedure: CYSTOSCOPY, WITH RETROGRADE PYELOGRAM AND URETERAL STENT INSERTION;  Surgeon: Dayana An MD;  Location: [facility];  Service: Urology;  Laterality: Left;     CYSTOURETEROSCOPY,WITH HOLMIUM LASER LITHOTRIPSY OF URETERAL CALCULUS Left 12/14/2022    Procedure: CYSTOURETEROSCOPY,WITH HOLMIUM LASER LITHOTRIPSY OF URETERAL CALCULUS;  Surgeon: Dayana An MD;  Location: [facility] OR;  Service: Urology;  Laterality: Left;    HERNIA REPAIR      kidney stones      KNEE ARTHROSCOPY W/ LASER      LASER LITHOTRIPSY Left 11/2/2022    Procedure: LITHOTRIPSY, USING LASER;  Surgeon: Dayana An MD;  Location: [facility] OR;  Service: Urology;  Laterality: Left;    RETROGRADE PYELOGRAPHY Left 10/11/2022    Procedure: PYELOGRAM, RETROGRADE;  Surgeon: Dayana An MD;  Location: [facility] OR;  Service: Urology;  Laterality: Left;    URETERAL STENT PLACEMENT N/A 10/11/2022    Procedure: INSERTION, STENT, URETER;  Surgeon: Dayana An MD;  Location: [facility] OR;  Service: Urology;  Laterality: N/A;    URETEROSCOPIC REMOVAL OF URETERIC CALCULUS Left 11/2/2022    Procedure: REMOVAL, CALCULUS, URETER, URETEROSCOPIC;  Surgeon: Dayana An MD;  Location: [facility] OR;  Service: Urology;  Laterality: Left;       Social History: Patient works as .    Prior Intubation HX:  not associated with this admit    Modified Barium Swallow: none reported or located    Chest X-Rays: no recent chest xrays located    Prior diet: no restrictions.    Occupation/hobbies/homemaking:  as above.    Subjective     Pt reported feeling back to baseline except for mild dizziness when he tries to walk.  Patient goals: none stated     Pain/Comfort:  Pain Rating 1: 0/10    Respiratory Status: Room air    Objective:     Cognitive Status:  Within functional limits (per pt at baseline)  Alert and oriented x 4; follows 3-step pointing instructions; simple math 75% (says he was not good at math at baseline).  Repeats 5 numerals without error; responds to yes/no questions; able to subtracts by 7's for 2/2 trials.  Pt demonstrated 100% accuracy on pattern generation task requiring  use of executive function/visual-spatial skills.       Receptive Language: functional limits  Followed left/right instructions;  1-step commands 5/5, 2-step 3/3, 3-step 2/2.  Responded to yes/no questions without delays.      Pragmatics:  in functional limits  Good eye contact; appropriate; good turn-taking      Expressive Language:  in functional/normal limits  Naming intact; fluent; good picture description        Motor Speech: no problems noted; no dysarthria; fully inteligible      Voice: in functional limits for communication      Visual-Spatial: denies change; denies current blurring or diplopia  Located items left/right/midline in room;.    Reading: no change reported; read aloud 8/8 single words, short paragraph; demonstrated comprehension of short paragraph; followed 1/1 print commands      Written Expression:   Deferred; no report of difficulty    Oral Musculature Evaluation  Oral Musculature: WFL  Dentition: other (see comments) (upper partial; usually removed for eating due to imperfect fit); missing some dentition on lower but functional  Secretion Management: adequate  Mucosal Quality: adequate  Mandibular Strength and Mobility: WFL  Lingual Strength and Mobility: WFL  Velar Elevation: WFL  Buccal Strength and Mobility: WFL  Volitional Cough: WFL  Volitional Swallow: palpated  Voice Prior to PO Intake: dry before and after    Bedside Swallow Eval:     Already on regular consistency diet without complaint (says he ate 90% reg consistency breakfast)  Consistencies Assessed:  Thin liquids (multiple cup and straw sips for at least 3 oz intake)  Solids (yas cracker)     Oral Phase:   WFL (no anterior spillage or residue; mastication time appeared functional)    Pharyngeal Phase:   no overt clinical signs/symptoms of aspiration  no overt clinical signs/symptoms of pharyngeal dysphagia    Compensatory Strategies  Small bites due to incomplete dentition    Treatment: pt and spouse educated regarding swallow  safety precautions; they were advised to notify MD of any change in swallow, cognition, language, speech.    Goals:   Multidisciplinary Problems       SLP Goals       Not on file              Multidisciplinary Problems (Resolved)          Problem: SLP    Goal Priority Disciplines Outcome   SLP Goal   (Resolved)     SLP Met   Description: Evaluation only.  Please re-consult if needed.                       Plan:     Patient to be seen:   (no therapy recommended at this time)   Plan of Care expires:  06/21/25  Plan of Care reviewed with:  patient, spouse   SLP Follow-Up:  No       Time Tracking:     SLP Treatment Date:   06/21/25  Speech Start Time:  0930  Speech Stop Time:  0950     Speech Total Time (min):  20 min    Billable Minutes: Eval 12  and Eval Swallow and Oral Function 8    06/21/2025

## 2025-06-21 NOTE — PLAN OF CARE
Problem: Adult Inpatient Plan of Care  Goal: Plan of Care Review  Outcome: Progressing  Goal: Patient-Specific Goal (Individualized)  Outcome: Progressing  Goal: Absence of Hospital-Acquired Illness or Injury  Outcome: Progressing  Goal: Optimal Comfort and Wellbeing  Outcome: Progressing  Goal: Readiness for Transition of Care  Outcome: Progressing     Problem: Stroke, Ischemic (Includes Transient Ischemic Attack)  Goal: Optimal Coping  Outcome: Progressing  Goal: Effective Bowel Elimination  Outcome: Progressing  Goal: Optimal Cerebral Tissue Perfusion  Outcome: Progressing  Goal: Optimal Cognitive Function  Outcome: Progressing  Goal: Improved Communication Skills  Outcome: Progressing  Goal: Optimal Functional Ability  Outcome: Progressing  Goal: Optimal Nutrition Intake  Outcome: Progressing  Goal: Effective Oxygenation and Ventilation  Outcome: Progressing  Goal: Improved Sensorimotor Function  Outcome: Progressing  Goal: Safe and Effective Swallow  Outcome: Progressing  Goal: Effective Urinary Elimination  Outcome: Progressing     Problem: Fall Injury Risk  Goal: Absence of Fall and Fall-Related Injury  Outcome: Progressing     Problem: Infection  Goal: Absence of Infection Signs and Symptoms  Outcome: Progressing      Burow's Advancement Flap Text: The defect edges were debeveled with a #15 scalpel blade.  Given the location of the defect and the proximity to free margins a Burow's advancement flap was deemed most appropriate.  Using a sterile surgical marker, the appropriate advancement flap was drawn incorporating the defect and placing the expected incisions within the relaxed skin tension lines where possible.    The area thus outlined was incised deep to adipose tissue with a #15 scalpel blade.  The skin margins were undermined to an appropriate distance in all directions utilizing iris scissors.

## 2025-06-21 NOTE — SUBJECTIVE & OBJECTIVE
Interval History:  Patient evaluated at bedside.  Dizziness has improved at present time.  Echocardiogram is pending.  Awaiting Neurology to see patient.    Review of Systems   Constitutional:  Negative for activity change, appetite change, chills and fever.   Respiratory:  Negative for cough and shortness of breath.    Cardiovascular:  Negative for chest pain, palpitations and leg swelling.   Gastrointestinal:  Negative for abdominal pain, nausea and vomiting.   Neurological:  Negative for dizziness, weakness, light-headedness and numbness.     Objective:     Vital Signs (Most Recent):  Temp: 97.4 °F (36.3 °C) (06/21/25 0754)  Pulse: (!) 54 (06/21/25 0757)  Resp: 18 (06/21/25 0757)  BP: (!) 174/100 (06/21/25 0757)  SpO2: 98 % (06/21/25 0757) Vital Signs (24h Range):  Temp:  [97.3 °F (36.3 °C)-98.2 °F (36.8 °C)] 97.4 °F (36.3 °C)  Pulse:  [49-78] 54  Resp:  [16-21] 18  SpO2:  [94 %-99 %] 98 %  BP: (124-188)/() 174/100     Weight: 120 kg (264 lb 8.8 oz)  Body mass index is 36.9 kg/m².    Intake/Output Summary (Last 24 hours) at 6/21/2025 1009  Last data filed at 6/20/2025 2300  Gross per 24 hour   Intake 240 ml   Output --   Net 240 ml         Physical Exam  Constitutional:       Appearance: Normal appearance. He is obese.   HENT:      Head: Normocephalic.      Nose: Nose normal.      Mouth/Throat:      Mouth: Mucous membranes are moist.      Pharynx: Oropharynx is clear.   Cardiovascular:      Rate and Rhythm: Normal rate and regular rhythm.      Pulses: Normal pulses.      Heart sounds: Normal heart sounds.   Pulmonary:      Effort: Pulmonary effort is normal.      Breath sounds: Normal breath sounds.   Abdominal:      General: Bowel sounds are normal. There is no distension.      Palpations: Abdomen is soft.      Tenderness: There is no abdominal tenderness.   Musculoskeletal:      Right lower leg: No edema.      Left lower leg: No edema.   Skin:     General: Skin is warm.      Capillary Refill: Capillary  refill takes less than 2 seconds.   Neurological:      General: No focal deficit present.      Mental Status: He is alert and oriented to person, place, and time.   Psychiatric:         Mood and Affect: Mood normal.               Significant Labs: All pertinent labs within the past 24 hours have been reviewed.  Recent Lab Results  (Last 5 results in the past 24 hours)        06/21/25  0411   06/20/25  1117   06/20/25  1058   06/20/25  1058   06/20/25  1050        Albumin 4.0       4.6         ALP 53       54         ALT 39       43         Anion Gap 7       8         PTT 29.6  Comment: Refer to local heparin nomogram for intensity/dose specific therapeutic range.               AST 23       27         Baso # 0.06       0.06         Basophil % 1.2       1.1         BILIRUBIN TOTAL 0.4  Comment: For infants and newborns, interpretation of results should be based   on gestational age, weight and in agreement with clinical   observations.    Premature Infant recommended reference ranges:   0-24 hours:  <8.0 mg/dL   24-48 hours: <12.0 mg/dL   3-5 days:    <15.0 mg/dL   6-29 days:   <15.0 mg/dL       0.7  Comment: For infants and newborns, interpretation of results should be based   on gestational age, weight and in agreement with clinical   observations.    Premature Infant recommended reference ranges:   0-24 hours:  <8.0 mg/dL   24-48 hours: <12.0 mg/dL   3-5 days:    <15.0 mg/dL   6-29 days:   <15.0 mg/dL         BUN 16       17         Calcium 8.8       9.4         Chloride 105       101         Cholesterol Total       134  Comment: The National Cholesterol Education Program (NCEP) has set the  following guidelines (reference ranges) for Cholesterol:  Optimal.....................<200 mg/dL  Borderline High.............200-239 mg/dL  High........................> or = 240 mg/dL         CO2 28       29         Creatinine 0.9       1.0         eGFR >60       >60         Eos # 0.21       0.11         Eos % 4.3       2.0          Estimated Avg Glucose 128               Glucose 126       104         HDL       38  Comment: The National Cholesterol Education Program (NCEP) has set the  following guidelines (reference values) for HDL Cholesterol:  Low...............<40 mg/dL  Optimal...........>60 mg/dL         HDL/Cholesterol Ratio       28.4         Hematocrit 41.9       44.1         Hemoglobin 14.8       15.6         Hemoglobin A1C External 6.1  Comment: According to ADA guidelines, hemoglobin A1C <7.0% represents  optimal control in non-pregnant diabetic patients.  Different  metrics may apply to specific populations.      Standards of Medical Care in Diabetes - 2016.    For the purpose of screening for the presence of diabetes:  <5.7%     Consistent with the absence of diabetes  5.7-6.4%  Consistent with increasing risk for diabetes             (prediabetes)  >or=6.5%  Consistent with diabetes    Currently no consensus exists for use of hemoglobin A1C  for diagnosis of diabetes for children.               Immature Grans (Abs) 0.01  Comment: Mild elevation in immature granulocytes is non specific and can be seen in a variety of conditions including stress response, acute inflammation, trauma and pregnancy. Correlation with other laboratory and clinical findings is essential.       0.02  Comment: Mild elevation in immature granulocytes is non specific and can be seen in a variety of conditions including stress response, acute inflammation, trauma and pregnancy. Correlation with other laboratory and clinical findings is essential.         Immature Granulocytes 0.2       0.4         INR 1.0  Comment: Coumadin Therapy:    2.0 - 3.0 for INR for all indicators except mechanical heart valves    and antiphospholipid syndromes which should use 2.5 - 3.5.       1.0  Comment: Coumadin Therapy:    2.0 - 3.0 for INR for all indicators except mechanical heart valves    and antiphospholipid syndromes which should use 2.5 - 3.5.         LDL  Cholesterol       81.2  Comment: The National Cholesterol Education Program (NCEP) has set the  following guidelines (reference values) for LDL Cholesterol:  Optimal.......................<130 mg/dL  Borderline High...............130-159 mg/dL  High..........................160-189 mg/dL  Very High.....................>190 mg/dL           Lymph # 1.80       1.88         LYMPH % 36.4       34.1         Magnesium  2.0               MCH 33.7       33.5         MCHC 35.3       35.4         MCV 95       95         Mono # 0.50       0.58         Mono % 10.1       10.5         MPV 10.2       10.4         Neut # 2.4       2.9         Neut % 47.8       51.9         Non-HDL Cholesterol       96  Comment: Risk category and Non-HDL cholesterol goals:  Coronary heart disease (CHD)or equivalent (10-year risk of CHD >20%):  Non-HDL cholesterol goal     <130 mg/dL  Two or more CHD risk factors and 10-year risk of CHD <= 20%:  Non-HDL cholesterol goal     <160 mg/dL  0 to 1 CHD risk factor:  Non-HDL cholesterol goal     <190 mg/dL         nRBC 0       0         QRS Duration   104             [facility] QTC Calculation   445             Phosphorus Level 3.7               Platelet Count 179       219         POC Creatinine     1.1           POCT Glucose         99       Potassium 3.7       3.8         PROTEIN TOTAL 6.8       7.4         PT 11.3       11.4         RBC 4.39       4.66         RDW 12.8       12.9         Sample     VENOUS           Sodium 140       138         Total Cholesterol/HDL Ratio       3.5         Triglycerides       74  Comment: The National Cholesterol Education Program (NCEP) has set the  following guidelines (reference values) for triglycerides:  Normal......................<150 mg/dL  Borderline High.............150-199 mg/dL  High........................200-499 mg/dL           TSH       0.853         WBC 4.94       5.52                                Significant Imaging: I have reviewed all pertinent imaging  results/findings within the past 24 hours.

## 2025-06-21 NOTE — PROGRESS NOTES
[facility] Medicine  Progress Note    Patient Name: Tato Weaver  MRN: [id number]  Patient Class: OP- Observation   Admission Date: 6/20/2025  Length of Stay: 0 days  Attending Physician: Tello Lai MD  Primary Care Provider: Lainey Hayden NP        Subjective     Principal Problem:Stroke-like symptoms        HPI:  59-year-old male presented to ED for eval of stroke-like symptoms. pMHx HTN, thyroid disease.  Patient reported around 7:00 a.m. this morning he had sudden onset dizziness lightheadedness when going from seated to standing position at a work meeting, with associated difficulty ambulating and diplopia.  Patient stated when ambulating it feels like he is drunk.  Patient denies other focal neuro deficits.  Denies history of stroke.  Denies history of similar symptoms.  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  In ED today, CTH impression with normal CT appearance of the brain. CTA H/N impression with no clinically significant stenosis  and no LVO. Tele stroke consult done in ED, TNK not recommended 2/2 mild non disabling symptoms, recs MRI brain and stroke/TIA work-up.  Patient loaded with aspirin and Plavix in ED. MRI brain pending. Admit to hospital medicine for further eval.      Overview/Hospital Course:  59-year-old male admitted for dizziness and lightheadedness.  Stroke workup was initiated.  MRI of brain negative.  CTA of head and neck without large vessel occlusion or significant carotid stenosis.  Echocardiogram was obtained.  Neurology was consulted.    Interval History:  Patient evaluated at bedside.  Dizziness has improved at present time.  Echocardiogram is pending.  Awaiting Neurology  to see patient.    Review of Systems   Constitutional:  Negative for activity change, appetite change, chills and fever.   Respiratory:  Negative for cough and shortness of breath.    Cardiovascular:  Negative for chest pain, palpitations and leg swelling.   Gastrointestinal:  Negative for abdominal pain, nausea and vomiting.   Neurological:  Negative for dizziness, weakness, light-headedness and numbness.     Objective:     Vital Signs (Most Recent):  Temp: 97.4 °F (36.3 °C) (06/21/25 0754)  Pulse: (!) 54 (06/21/25 0757)  Resp: 18 (06/21/25 0757)  BP: (!) 174/100 (06/21/25 0757)  SpO2: 98 % (06/21/25 0757) Vital Signs (24h Range):  Temp:  [97.3 °F (36.3 °C)-98.2 °F (36.8 °C)] 97.4 °F (36.3 °C)  Pulse:  [49-78] 54  Resp:  [16-21] 18  SpO2:  [94 %-99 %] 98 %  BP: (124-188)/() 174/100     Weight: 120 kg (264 lb 8.8 oz)  Body mass index is 36.9 kg/m².    Intake/Output Summary (Last 24 hours) at 6/21/2025 1009  Last data filed at 6/20/2025 2300  Gross per 24 hour   Intake 240 ml   Output --   Net 240 ml         Physical Exam  Constitutional:       Appearance: Normal appearance. He is obese.   HENT:      Head: Normocephalic.      Nose: Nose normal.      Mouth/Throat:      Mouth: Mucous membranes are moist.      Pharynx: Oropharynx is clear.   Cardiovascular:      Rate and Rhythm: Normal rate and regular rhythm.      Pulses: Normal pulses.      Heart sounds: Normal heart sounds.   Pulmonary:      Effort: Pulmonary effort is normal.      Breath sounds: Normal breath sounds.   Abdominal:      General: Bowel sounds are normal. There is no distension.      Palpations: Abdomen is soft.      Tenderness: There is no abdominal tenderness.   Musculoskeletal:      Right lower leg: No edema.      Left lower leg: No edema.   Skin:     General: Skin is warm.      Capillary Refill: Capillary refill takes less than 2 seconds.   Neurological:      General: No focal deficit present.      Mental Status: He is alert and oriented to  person, place, and time.   Psychiatric:         Mood and Affect: Mood normal.               Significant Labs: All pertinent labs within the past 24 hours have been reviewed.  Recent Lab Results  (Last 5 results in the past 24 hours)        06/21/25  0411   06/20/25  1117   06/20/25  1058   06/20/25  1058   06/20/25  1050        Albumin 4.0       4.6         ALP 53       54         ALT 39       43         Anion Gap 7       8         PTT 29.6  Comment: Refer to local heparin nomogram for intensity/dose specific therapeutic range.               AST 23       27         Baso # 0.06       0.06         Basophil % 1.2       1.1         BILIRUBIN TOTAL 0.4  Comment: For infants and newborns, interpretation of results should be based   on gestational age, weight and in agreement with clinical   observations.    Premature Infant recommended reference ranges:   0-24 hours:  <8.0 mg/dL   24-48 hours: <12.0 mg/dL   3-5 days:    <15.0 mg/dL   6-29 days:   <15.0 mg/dL       0.7  Comment: For infants and newborns, interpretation of results should be based   on gestational age, weight and in agreement with clinical   observations.    Premature Infant recommended reference ranges:   0-24 hours:  <8.0 mg/dL   24-48 hours: <12.0 mg/dL   3-5 days:    <15.0 mg/dL   6-29 days:   <15.0 mg/dL         BUN 16       17         Calcium 8.8       9.4         Chloride 105       101         Cholesterol Total       134  Comment: The National Cholesterol Education Program (NCEP) has set the  following guidelines (reference ranges) for Cholesterol:  Optimal.....................<200 mg/dL  Borderline High.............200-239 mg/dL  High........................> or = 240 mg/dL         CO2 28       29         Creatinine 0.9       1.0         eGFR >60       >60         Eos # 0.21       0.11         Eos % 4.3       2.0         Estimated Avg Glucose 128               Glucose 126       104         HDL       38  Comment: The National Cholesterol Education  Program (NCEP) has set the  following guidelines (reference values) for HDL Cholesterol:  Low...............<40 mg/dL  Optimal...........>60 mg/dL         HDL/Cholesterol Ratio       28.4         Hematocrit 41.9       44.1         Hemoglobin 14.8       15.6         Hemoglobin A1C External 6.1  Comment: According to ADA guidelines, hemoglobin A1C <7.0% represents  optimal control in non-pregnant diabetic patients.  Different  metrics may apply to specific populations.      Standards of Medical Care in Diabetes - 2016.    For the purpose of screening for the presence of diabetes:  <5.7%     Consistent with the absence of diabetes  5.7-6.4%  Consistent with increasing risk for diabetes             (prediabetes)  >or=6.5%  Consistent with diabetes    Currently no consensus exists for use of hemoglobin A1C  for diagnosis of diabetes for children.               Immature Grans (Abs) 0.01  Comment: Mild elevation in immature granulocytes is non specific and can be seen in a variety of conditions including stress response, acute inflammation, trauma and pregnancy. Correlation with other laboratory and clinical findings is essential.       0.02  Comment: Mild elevation in immature granulocytes is non specific and can be seen in a variety of conditions including stress response, acute inflammation, trauma and pregnancy. Correlation with other laboratory and clinical findings is essential.         Immature Granulocytes 0.2       0.4         INR 1.0  Comment: Coumadin Therapy:    2.0 - 3.0 for INR for all indicators except mechanical heart valves    and antiphospholipid syndromes which should use 2.5 - 3.5.       1.0  Comment: Coumadin Therapy:    2.0 - 3.0 for INR for all indicators except mechanical heart valves    and antiphospholipid syndromes which should use 2.5 - 3.5.         LDL Cholesterol       81.2  Comment: The National Cholesterol Education Program (NCEP) has set the  following guidelines (reference values) for LDL  Cholesterol:  Optimal.......................<130 mg/dL  Borderline High...............130-159 mg/dL  High..........................160-189 mg/dL  Very High.....................>190 mg/dL           Lymph # 1.80       1.88         LYMPH % 36.4       34.1         Magnesium  2.0               MCH 33.7       33.5         MCHC 35.3       35.4         MCV 95       95         Mono # 0.50       0.58         Mono % 10.1       10.5         MPV 10.2       10.4         Neut # 2.4       2.9         Neut % 47.8       51.9         Non-HDL Cholesterol       96  Comment: Risk category and Non-HDL cholesterol goals:  Coronary heart disease (CHD)or equivalent (10-year risk of CHD >20%):  Non-HDL cholesterol goal     <130 mg/dL  Two or more CHD risk factors and 10-year risk of CHD <= 20%:  Non-HDL cholesterol goal     <160 mg/dL  0 to 1 CHD risk factor:  Non-HDL cholesterol goal     <190 mg/dL         nRBC 0       0         QRS Duration   104             OHS QTC Calculation   445             Phosphorus Level 3.7               Platelet Count 179       219         POC Creatinine     1.1           POCT Glucose         99       Potassium 3.7       3.8         PROTEIN TOTAL 6.8       7.4         PT 11.3       11.4         RBC 4.39       4.66         RDW 12.8       12.9         Sample     VENOUS           Sodium 140       138         Total Cholesterol/HDL Ratio       3.5         Triglycerides       74  Comment: The National Cholesterol Education Program (NCEP) has set the  following guidelines (reference values) for triglycerides:  Normal......................<150 mg/dL  Borderline High.............150-199 mg/dL  High........................200-499 mg/dL           TSH       0.853         WBC 4.94       5.52                                Significant Imaging: I have reviewed all pertinent imaging results/findings within the past 24 hours.      Assessment & Plan  Stroke-like symptoms    Antithrombotics for secondary stroke prevention:  Antiplatelets: Aspirin: 81 mg daily  Clopidogrel: 75 mg daily    Statins for secondary stroke prevention and hyperlipidemia, if present:   Statins: Atorvastatin- 40 mg daily    Aggressive risk factor modification: HTN, Obesity     Rehab efforts: The patient has been evaluated by a stroke team provider and the therapy needs have been fully considered based off the presenting complaints and exam findings. The following therapy evaluations are needed: PT evaluate and treat, OT evaluate and treat, SLP evaluate and treat    Diagnostics ordered/pending: CT scan of head without contrast to asses brain parenchyma, CTA Head to assess vasculature , CTA Neck/Arch to assess vasculature, HgbA1C to assess blood glucose levels, Lipid Profile to assess cholesterol levels, MRI head without contrast to assess brain parenchyma, TTE to assess cardiac function/status , TSH to assess thyroid function    VTE prophylaxis: Enoxaparin 40 mg SQ every 24 hours    BP parameters: TIA: SBP <220 until imaging confirmation of no infarct     Neuro checks  Orthostatic VS, not orthostatic  Consult neurology       Essential (primary) hypertension  Patient's blood pressure range in the last 24 hours was: BP  Min: 124/79  Max: 188/98.The patient's inpatient anti-hypertensive regimen is listed below:  Current Antihypertensives  labetaloL injection 10 mg, Every 15 min PRN, Intravenous  hydroCHLOROthiazide tablet 12.5 mg, Daily, Oral  losartan tablet 100 mg, Daily, Oral    Plan  - BP is controlled, no changes needed to their regimen    Thyroid disease  TSH 0.8, check free T4  Resume home levothyroxine    VTE Risk Mitigation (From admission, onward)           Ordered     enoxaparin injection 40 mg  Daily         06/20/25 1355     IP VTE HIGH RISK PATIENT  Once         06/20/25 1355     Place sequential compression device  Until discontinued         06/20/25 1355                    Discharge Planning   STU: 6/22/2025     Code Status: Full Code   Medical  Readiness for Discharge Date:   Discharge Plan A: Home with family                  Please place Justification for DME      GISEL Torres  Department of Hospital Medicine   [facility]

## 2025-06-21 NOTE — PROGRESS NOTES
06/21/25 0754 06/21/25 0757 06/21/25 0757   Vital Signs   Pulse (!) 54 (!) 49 (!) 54   Heart Rate Source Monitor Monitor Monitor   Resp 18 18 18   SpO2 96 % 96 % 98 %   Device (Oxygen Therapy) room air room air room air   BP (!) 166/99 (!) 155/97 (!) 174/100   MAP (mmHg) 121 116 125   BP Location Right arm Right arm Right arm   BP Method Automatic Automatic Automatic   Patient Position Lying Sitting Standing   Orthostatic VS Yes Yes Yes

## 2025-06-21 NOTE — SUBJECTIVE & OBJECTIVE
HPI:  59-year-old male presented to ED for eval of stroke-like symptoms. pMHx HTN, thyroid disease.  Patient reported around 7:00 a.m. this morning he had sudden onset dizziness lightheadedness when going from seated to standing position at a work meeting, with associated difficulty ambulating and diplopia.  Patient stated when ambulating it feels like he is drunk.  Patient denies other focal neuro deficits.  Denies history of stroke.  Denies history of similar symptoms.  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  In ED today, CTH impression with normal CT appearance of the brain. CTA H/N impression with no clinically significant stenosis  and no LVO. Tele stroke consult done in ED, TNK not recommended 2/2 mild non disabling symptoms, recs MRI brain and stroke/TIA work-up.  Patient loaded with aspirin and Plavix in ED. MRI brain is negative for acute stroke. CTA is neg for LVO. Patient had dizziness and reported ataxia- suspect TIA vs hypertensive urgency. Agree with DAPT x 21 days then monotx with asa 81 mg. Lipitor 40 mg daily.      Images personally reviewed and interpreted:  Study: MRI Brain  Study Interpretation: neg for stroke    Additional studies reviewed:   Study: Cardiac_Neuro: 2D echo  Study Interpretation: N/A    Laboratory studies reviewed:  BMP:   Lab Results   Component Value Date     06/21/2025    K 3.7 06/21/2025     06/21/2025    CO2 28 06/21/2025    BUN 16 06/21/2025    CREATININE 0.9 06/21/2025    CALCIUM 8.8 06/21/2025     CBC:   Lab Results   Component Value Date    WBC 4.94 06/21/2025    RBC 4.39 (L) 06/21/2025    HGB 14.8 06/21/2025    HCT 41.9 06/21/2025     06/21/2025    MCV 95 06/21/2025    MCH 33.7  (H) 06/21/2025    MCHC 35.3 06/21/2025     Lipid Panel:   Lab Results   Component Value Date    CHOL 134 06/20/2025    LDLCALC 81.2 06/20/2025    HDL 38 (L) 06/20/2025    TRIG 74 06/20/2025     Coagulation:   Lab Results   Component Value Date    INR 1.0 06/21/2025    APTT 29.6 06/21/2025     Hgb A1C:   Lab Results   Component Value Date    HGBA1C 6.1 06/21/2025     TSH:   Lab Results   Component Value Date    TSH 0.853 06/20/2025       Documentation personally reviewed:  Notes: {Notes:[id number]}     Assessment and plan:  59-year-old male presented to ED for eval of stroke-like symptoms. pMHx HTN, thyroid disease.  Patient reported around 7:00 a.m. this morning he had sudden onset dizziness lightheadedness when going from seated to standing position at a work meeting, with associated difficulty ambulating and diplopia.  Patient stated when ambulating it feels like he is drunk.  Patient denies other focal neuro deficits.  Denies history of stroke.  Denies history of similar symptoms.  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  In ED today, CTH impression with normal CT appearance of the brain. CTA H/N impression with no clinically significant stenosis  and no LVO. Tele stroke consult done in ED, TNK not recommended 2/2 mild non disabling symptoms, recs MRI brain and stroke/TIA work-up.  Patient loaded with aspirin and Plavix in ED. MRI brain is negative for acute stroke. CTA is neg for LVO. Patient had dizziness and reported ataxia- suspect TIA vs hypertensive urgency. Agree with DAPT x 21 days then monotx with asa 81 mg. Lipitor 40 mg daily.     DAPT x 21 days then monotx with asa 81 mg  Lipitor 40 mg daily- titrate to LDL < 70  BP control- vascular  risk factor optimization  PT/OT/SLP        Post charge discharge plan:  Clinic follow up: General Neurology    Visit Type: in person or virtual  Timeframe: 6 weeks

## 2025-06-21 NOTE — ASSESSMENT & PLAN NOTE
Antithrombotics for secondary stroke prevention: Antiplatelets: Aspirin: 81 mg daily  Clopidogrel: 75 mg daily    Statins for secondary stroke prevention and hyperlipidemia, if present:   Statins: Atorvastatin- 40 mg daily    Aggressive risk factor modification: HTN, Obesity     Rehab efforts: The patient has been evaluated by a stroke team provider and the therapy needs have been fully considered based off the presenting complaints and exam findings. The following therapy evaluations are needed: PT evaluate and treat, OT evaluate and treat, SLP evaluate and treat    Diagnostics ordered/pending: CT scan of head without contrast to asses brain parenchyma, CTA Head to assess vasculature , CTA Neck/Arch to assess vasculature, HgbA1C to assess blood glucose levels, Lipid Profile to assess cholesterol levels, MRI head without contrast to assess brain parenchyma, TTE to assess cardiac function/status , TSH to assess thyroid function    VTE prophylaxis: Enoxaparin 40 mg SQ every 24 hours    BP parameters: TIA: SBP <220 until imaging confirmation of no infarct     Neuro checks  Orthostatic VS, not orthostatic  Consult neurology

## 2025-06-21 NOTE — TELEMEDICINE CONSULT
"[facility]   General Neurology  Consult Note      Consult Information  Inpatient Consult to Neurology Services (General Neurology)  Consult performed by: Aleksey García MD  Consult ordered by: Nicky Matamoros FNP      Inpatient Consult to Neurology Services (General Neurology)  Consult performed by: Aleksey García MD  Consult ordered by: Daria Escobar NP          Consulting Provider: JOVANNY VIVAR   Current Providers  No providers found    Patient Location:  [address]    Spoke hospital nurse at bedside with patient assisting consultant.  Patient information was obtained from patient.       Neurology Documentation  Acute Stroke Times   Last Known Normal Date: 06/20/25  Last Known Normal Time: 0700  Symptom Onset Date: 06/20/25  Symptom Onset Time: 0700  Stroke Team Called Date: 06/20/25  Stroke Team Called Time: 1057  Stroke Team Arrival Date: 06/20/25  Stroke Team Arrival Time: 1057  CT Interpretation Time: 1106  Thrombolytic Therapy Recommended: No    Blood pressure (!) 169/103, pulse (!) 50, temperature 97.5 °F (36.4 °C), temperature source Oral, resp. rate 18, height 5' 11" (1.803 m), weight 120 kg (264 lb 8.8 oz), SpO2 97%.    Medical Decision Making  HPI:  59-year-old male presented to ED for eval of stroke-like symptoms. pMHx HTN, thyroid disease.  Patient reported around 7:00 a.m. this morning he had sudden onset dizziness lightheadedness when going from seated to standing position at a work meeting, with associated difficulty ambulating and diplopia.  Patient stated when ambulating it feels like he is drunk.  Patient denies other focal neuro deficits.  Denies history of stroke.  Denies history of similar symptoms.  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  In ED today, CTH impression with normal CT appearance of the brain. CTA H/N impression with no clinically significant stenosis  and no LVO. Tele stroke consult done in ED, TNK not recommended 2/2 mild non disabling symptoms, recs MRI brain and stroke/TIA work-up.  Patient loaded with aspirin and Plavix in ED. MRI brain is negative for acute stroke. CTA is neg for LVO. Patient had dizziness and reported ataxia- suspect TIA vs hypertensive urgency. Agree with DAPT x 21 days then monotx with asa 81 mg. Lipitor 40 mg daily.      Images personally reviewed and interpreted:  Study: MRI Brain  Study Interpretation: neg for stroke    Additional studies reviewed:   Study: Cardiac_Neuro: 2D echo  Study Interpretation: N/A    Laboratory studies reviewed:  BMP:   Lab Results   Component Value Date     06/21/2025    K 3.7 06/21/2025     06/21/2025    CO2 28 06/21/2025    BUN 16 06/21/2025    CREATININE 0.9 06/21/2025    CALCIUM 8.8 06/21/2025     CBC:   Lab Results   Component Value Date    WBC 4.94 06/21/2025    RBC 4.39 (L) 06/21/2025    HGB 14.8 06/21/2025    HCT 41.9 06/21/2025     06/21/2025    MCV 95 06/21/2025    MCH 33.7 (H) 06/21/2025    MCHC 35.3 06/21/2025     Lipid Panel:   Lab Results   Component Value Date    CHOL 134 06/20/2025    LDLCALC 81.2 06/20/2025    HDL 38 (L) 06/20/2025    TRIG 74 06/20/2025     Coagulation:   Lab Results   Component Value Date    INR 1.0 06/21/2025    APTT 29.6 06/21/2025     Hgb A1C:   Lab Results   Component Value Date    HGBA1C 6.1 06/21/2025     TSH:   Lab Results   Component Value Date    TSH 0.853 06/20/2025       Documentation personally reviewed:  Notes: H&P, ED note      Assessment and plan:  59-year-old male presented to ED for eval of stroke-like symptoms. pMHx HTN, thyroid disease.  Patient reported around 7:00 a.m. this morning he had sudden onset dizziness lightheadedness when going from seated  to standing position at a work meeting, with associated difficulty ambulating and diplopia.  Patient stated when ambulating it feels like he is drunk.  Patient denies other focal neuro deficits.  Denies history of stroke.  Denies history of similar symptoms.  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  In ED today, CTH impression with normal CT appearance of the brain. CTA H/N impression with no clinically significant stenosis  and no LVO. Tele stroke consult done in ED, TNK not recommended 2/2 mild non disabling symptoms, recs MRI brain and stroke/TIA work-up.  Patient loaded with aspirin and Plavix in ED. MRI brain is negative for acute stroke. CTA is neg for LVO. Patient had dizziness and reported ataxia- suspect TIA vs hypertensive urgency. Agree with DAPT x 21 days then monotx with asa 81 mg. Lipitor 40 mg daily.     DAPT x 21 days then monotx with asa 81 mg  Lipitor 40 mg daily- titrate to LDL < 70  BP control- vascular risk factor optimization  PT/OT/SLP        Post charge discharge plan:  Clinic follow up: General Neurology    Visit Type: in person or virtual  Timeframe: 6 weeks        Additional Physical Exam, History, & ROS  Review of Systems   Neurological:  Positive for dizziness. Negative for sensory change, speech change, focal weakness, seizures, loss of consciousness and weakness.        Reported ataxia     Physical Exam  Neurological:      Cranial Nerves: No dysarthria or facial asymmetry.      Motor: No weakness, tremor or seizure activity.      Coordination: Finger-Nose-Finger Test normal.       Past Medical History:   Diagnosis Date    Essential (primary) hypertension     Kidney stones     Thyroid disease      Past Surgical  History:   Procedure Laterality Date    CYSTOSCOPY WITH URETEROSCOPY, RETROGRADE PYELOGRAPHY, AND INSERTION OF STENT Left 11/2/2022    Procedure: CYSTOSCOPY, WITH RETROGRADE PYELOGRAM AND URETERAL STENT INSERTION;  Surgeon: Dayana An MD;  Location: [facility] OR;  Service: Urology;  Laterality: Left;    CYSTOURETEROSCOPY,WITH HOLMIUM LASER LITHOTRIPSY OF URETERAL CALCULUS Left 12/14/2022    Procedure: CYSTOURETEROSCOPY,WITH HOLMIUM LASER LITHOTRIPSY OF URETERAL CALCULUS;  Surgeon: Dayana An MD;  Location: [facility] OR;  Service: Urology;  Laterality: Left;    HERNIA REPAIR      kidney stones      KNEE ARTHROSCOPY W/ LASER      LASER LITHOTRIPSY Left 11/2/2022    Procedure: LITHOTRIPSY, USING LASER;  Surgeon: Dayana An MD;  Location: [facility] OR;  Service: Urology;  Laterality: Left;    RETROGRADE PYELOGRAPHY Left 10/11/2022    Procedure: PYELOGRAM, RETROGRADE;  Surgeon: Dayana An MD;  Location: [facility] OR;  Service: Urology;  Laterality: Left;    URETERAL STENT PLACEMENT N/A 10/11/2022    Procedure: INSERTION, STENT, URETER;  Surgeon: Dayana An MD;  Location: [facility] OR;  Service: Urology;  Laterality: N/A;    URETEROSCOPIC REMOVAL OF URETERIC CALCULUS Left 11/2/2022    Procedure: REMOVAL, CALCULUS, URETER, URETEROSCOPIC;  Surgeon: Dayana An MD;  Location: [facility] OR;  Service: Urology;  Laterality: Left;     Family History   Problem Relation Name Age of Onset    No Known Problems Mother      Cancer Father      Cancer Sister      Heart attack Sister         Diagnoses  No problems updated.    Aleksey García MD    Neurology consultation requested by spoke provider. Audiovisual encounter with the patient performed using a secure connection.  Results and impressions from the visit are documented on this note and were communicated to the consulting provider/team via direct communication. The note has been shared for addition to the patients  electronic medical record.

## 2025-06-21 NOTE — CARE UPDATE
06/21/25 0753   Patient Assessment/Suction   Level of Consciousness (AVPU) alert   Respiratory Effort Normal   Expansion/Accessory Muscles/Retractions no use of accessory muscles   All Lung Fields Breath Sounds clear   Rhythm/Pattern, Respiratory unlabored   Cough Frequency no cough   PRE-TX-O2   Device (Oxygen Therapy) room air   SpO2 97 %   Pulse Oximetry Type Intermittent   $ Pulse Oximetry - Single Charge Pulse Oximetry - Single   Pulse (!) 59   Resp 16   Education   $ Education 15 min;Other (see comment)  (pulse)

## 2025-06-21 NOTE — PLAN OF CARE
Problem: SLP  Goal: SLP Goal  Description: Evaluation only.  Please re-consult if needed.  Outcome: Met

## 2025-06-21 NOTE — PT/OT/SLP EVAL
Occupational Therapy   Evaluation and Discharge Note    Name: Tato Weaver  MRN: [id number]  Admitting Diagnosis: Stroke-like symptoms  Recent Surgery: * No surgery found *    Diagnoses of Acute focal neurological deficit and Stroke-like symptoms were pertinent to this visit.    Recommendations:     Discharge Recommendations: No Therapy Indicated  Discharge Equipment Recommendations: none  Barriers to discharge:  None    Assessment:     Tato Weaver is a 59 y.o. male with a medical diagnosis of Stroke-like symptoms. At this time, patient is functioning at their prior level of function and does not require further acute OT services.   No residual effects of TIA noted. Pt says all symptoms of dizziness and decreased balance have resolved. Pt was counseled by OT on importance of healthy lifestyle change post TIA to include reqular exercise & healthy eating. Also educated pt on BEFAST s/s of stroke. OT recommend pt consult w/ MD before starting any exercise program for clearance and nutritionist for healthy food advice. Pt verbalized understanding and in agreement to recommendations. Discharge OT.  Plan:     During this hospitalization, patient does not require further acute OT services.  Please re-consult if situation changes.    Plan of Care Reviewed with: patient    Subjective     Chief Complaint: none  Patient/Family Comments/goals: I am better, no more dizziness or balance issues.   I am going to start going to the gym with my brother n law.   Occupational Profile:  Living Environment: pt lives with spouse in [facility] w/ no steps; WIS .  Previous level of function: Indep ADLS, ambulation, droves, works full time as .  Roles and Routines: , worker, active.  Equipment Used at home: none  Assistance upon Discharge: spouse    Pain/Comfort:  Pain Rating 1: 0/10  Pain Rating Post-Intervention 1: 0/10    Patients cultural, spiritual, [employer] conflicts given the current situation:  no    Objective:     Communicated with: nurse prior to session.  Patient found HOB elevated with telemetry upon OT entry to room.    General Precautions: Standard, fall  Orthopedic Precautions: N/A  Braces: N/A  Respiratory Status: Room air     Occupational Performance:    Bed Mobility:    Patient completed Rolling/Turning to Left with  independence  Patient completed Scooting/Bridging with independence  Patient completed Supine to Sit with independence  Patient completed Sit to Supine with independence    Functional Mobility/Transfers:  Patient completed Sit <> Stand Transfer with independence  with  no assistive device   Functional Mobility: indep in room w/o a device; no LOB    Activities of Daily Living:  Feeding:  independence .  Grooming: independence    Bathing: independence simulated  Upper Body Dressing: independence    Lower Body Dressing: independence    Toileting: independence simulated    Cognitive/Visual Perceptual:  Cognitive/Psychosocial Skills:     -       Oriented to: Person, Place, Time, and Situation   -       Follows Commands/attention:Follows multistep  commands  -       Communication: clear/fluent  -       Memory: No Deficits noted  -       Safety awareness/insight to disability: intact   -       Mood/Affect/Coping skills/emotional control: Cooperative and Pleasant  Visual/Perceptual:      -Intact ; L eye bulge-pt report chronic and not affecting vision    Physical Exam:  Balance:    -       good standing and sitting  Postural examination/scapula alignment:    -       No postural abnormalities identified  Sensation:    -       Intact  Motor Planning:    -       intact  Dominant hand:    -       right  Upper Extremity Range of Motion:     -       Right Upper Extremity: WNL  -       Left Upper Extremity: WNL  Upper Extremity Strength:    -       Right Upper Extremity: WNL  -       Left Upper Extremity: WNL   Strength:    -       Right Upper Extremity: WNL  -       Left Upper Extremity:  WNL  Fine Motor Coordination:    -       Intact  Gross motor coordination:   WFL  Neurological:    -       normal tone    AMPAC 6 Click ADL:  AMPAC Total Score: 24    Treatment & Education:  Purpose of OT and pt education on  Healthy lifestyle change post TIA to include reqular exercise & healthy diet. BEFAST s/s of stroke: OT recommend pt consult w/ MD before starting any exercise program for clearance and nutritionist for healthy food advice.       Patient left HOB elevated with all lines intact and call button in reach    GOALS:   Multidisciplinary Problems       Occupational Therapy Goals       Not on file                    DME Justifications:  No DME recommended requiring DME justifications    History:     Past Medical History:   Diagnosis Date    Essential (primary) hypertension     Kidney stones     Thyroid disease          Past Surgical History:   Procedure Laterality Date    CYSTOSCOPY WITH URETEROSCOPY, RETROGRADE PYELOGRAPHY, AND INSERTION OF STENT Left 11/2/2022    Procedure: CYSTOSCOPY, WITH RETROGRADE PYELOGRAM AND URETERAL STENT INSERTION;  Surgeon: Dayana An MD;  Location: [facility] OR;  Service: Urology;  Laterality: Left;    CYSTOURETEROSCOPY,WITH HOLMIUM LASER LITHOTRIPSY OF URETERAL CALCULUS Left 12/14/2022    Procedure: CYSTOURETEROSCOPY,WITH HOLMIUM LASER LITHOTRIPSY OF URETERAL CALCULUS;  Surgeon: Dayana An MD;  Location: [facility] OR;  Service: Urology;  Laterality: Left;    HERNIA REPAIR      kidney stones      KNEE ARTHROSCOPY W/ LASER      LASER LITHOTRIPSY Left 11/2/2022    Procedure: LITHOTRIPSY, USING LASER;  Surgeon: Dayana An MD;  Location: [facility] OR;  Service: Urology;  Laterality: Left;    RETROGRADE PYELOGRAPHY Left 10/11/2022    Procedure: PYELOGRAM, RETROGRADE;  Surgeon: Dayana An MD;  Location: [facility] OR;  Service: Urology;  Laterality: Left;    URETERAL STENT PLACEMENT N/A 10/11/2022    Procedure: INSERTION, STENT, URETER;   Surgeon: Dayana An MD;  Location: [facility];  Service: Urology;  Laterality: N/A;    URETEROSCOPIC REMOVAL OF URETERIC CALCULUS Left 11/2/2022    Procedure: REMOVAL, CALCULUS, URETER, URETEROSCOPIC;  Surgeon: Dayana An MD;  Location: [facility];  Service: Urology;  Laterality: Left;       Time Tracking:     OT Date of Treatment:    OT Start Time: 1350 (1554) OT eval was interrupted by virtual visit from MD. OT left room and returned later to complete eval.   OT Stop Time: 1354 (1604)  OT Total Time (min): 4 min+10=14 minutes    Billable Minutes:Evaluation 14  Total Time 14    6/21/2025

## 2025-06-22 VITALS
HEART RATE: 78 BPM | BODY MASS INDEX: 37.04 KG/M2 | DIASTOLIC BLOOD PRESSURE: 73 MMHG | OXYGEN SATURATION: 97 % | WEIGHT: 264.56 LBS | HEIGHT: 71 IN | RESPIRATION RATE: 17 BRPM | TEMPERATURE: 97 F | SYSTOLIC BLOOD PRESSURE: 121 MMHG

## 2025-06-22 LAB
ABSOLUTE EOSINOPHIL (SMH): 0.19 K/UL
ABSOLUTE MONOCYTE (SMH): 0.46 K/UL (ref 0.3–1)
ABSOLUTE NEUTROPHIL COUNT (SMH): 2.2 K/UL (ref 1.8–7.7)
ALBUMIN SERPL-MCNC: 4.1 G/DL (ref 3.5–5.2)
ALP SERPL-CCNC: 52 UNIT/L (ref 55–135)
ALT SERPL-CCNC: 37 UNIT/L (ref 10–44)
ANION GAP (SMH): 9 MMOL/L (ref 8–16)
AST SERPL-CCNC: 22 UNIT/L (ref 10–40)
BASOPHILS # BLD AUTO: 0.08 K/UL
BASOPHILS NFR BLD AUTO: 1.6 %
BILIRUB SERPL-MCNC: 0.6 MG/DL (ref 0.1–1)
BUN SERPL-MCNC: 14 MG/DL (ref 6–20)
CALCIUM SERPL-MCNC: 9.2 MG/DL (ref 8.7–10.5)
CHLORIDE SERPL-SCNC: 104 MMOL/L (ref 95–110)
CO2 SERPL-SCNC: 29 MMOL/L (ref 23–29)
CREAT SERPL-MCNC: 1 MG/DL (ref 0.5–1.4)
ERYTHROCYTE [DISTWIDTH] IN BLOOD BY AUTOMATED COUNT: 12.5 % (ref 11.5–14.5)
GFR SERPLBLD CREATININE-BSD FMLA CKD-EPI: >60 ML/MIN/1.73/M2
GLUCOSE SERPL-MCNC: 103 MG/DL (ref 70–110)
HCT VFR BLD AUTO: 44 % (ref 40–54)
HGB BLD-MCNC: 15.1 GM/DL (ref 14–18)
IMM GRANULOCYTES # BLD AUTO: 0.02 K/UL (ref 0–0.04)
IMM GRANULOCYTES NFR BLD AUTO: 0.4 % (ref 0–0.5)
LYMPHOCYTES # BLD AUTO: 2.17 K/UL (ref 1–4.8)
MCH RBC QN AUTO: 33.6 PG (ref 27–31)
MCHC RBC AUTO-ENTMCNC: 34.3 G/DL (ref 32–36)
MCV RBC AUTO: 98 FL (ref 82–98)
NUCLEATED RBC (/100WBC) (SMH): 0 /100 WBC
PLATELET # BLD AUTO: 193 K/UL (ref 150–450)
PMV BLD AUTO: 10.2 FL (ref 9.2–12.9)
POTASSIUM SERPL-SCNC: 3.9 MMOL/L (ref 3.5–5.1)
PROT SERPL-MCNC: 6.7 GM/DL (ref 6–8.4)
RBC # BLD AUTO: 4.5 M/UL (ref 4.6–6.2)
RELATIVE EOSINOPHIL (SMH): 3.7 % (ref 0–8)
RELATIVE LYMPHOCYTE (SMH): 42.2 % (ref 18–48)
RELATIVE MONOCYTE (SMH): 8.9 % (ref 4–15)
RELATIVE NEUTROPHIL (SMH): 43.2 % (ref 38–73)
SODIUM SERPL-SCNC: 142 MMOL/L (ref 136–145)
WBC # BLD AUTO: 5.14 K/UL (ref 3.9–12.7)

## 2025-06-22 PROCEDURE — 80053 COMPREHEN METABOLIC PANEL: CPT

## 2025-06-22 PROCEDURE — 85025 COMPLETE CBC W/AUTO DIFF WBC: CPT

## 2025-06-22 PROCEDURE — G0378 HOSPITAL OBSERVATION PER HR: HCPCS

## 2025-06-22 PROCEDURE — 25000003 PHARM REV CODE 250

## 2025-06-22 PROCEDURE — 36415 COLL VENOUS BLD VENIPUNCTURE: CPT

## 2025-06-22 PROCEDURE — 25000003 PHARM REV CODE 250: Performed by: NURSE PRACTITIONER

## 2025-06-22 RX ORDER — ASPIRIN 81 MG/1
81 TABLET ORAL DAILY
Qty: 30 TABLET | Refills: 0 | Status: SHIPPED | OUTPATIENT
Start: 2025-06-23 | End: 2025-07-23

## 2025-06-22 RX ORDER — ATORVASTATIN CALCIUM 40 MG/1
40 TABLET, FILM COATED ORAL DAILY
Qty: 90 TABLET | Refills: 3 | Status: SHIPPED | OUTPATIENT
Start: 2025-06-23 | End: 2026-06-23

## 2025-06-22 RX ORDER — HYDRALAZINE HYDROCHLORIDE 25 MG/1
25 TABLET, FILM COATED ORAL EVERY 8 HOURS
Qty: 90 TABLET | Refills: 11 | Status: SHIPPED | OUTPATIENT
Start: 2025-06-22 | End: 2026-06-22

## 2025-06-22 RX ORDER — CLOPIDOGREL BISULFATE 75 MG/1
75 TABLET ORAL DAILY
Qty: 30 TABLET | Refills: 11 | Status: SHIPPED | OUTPATIENT
Start: 2025-06-23 | End: 2025-06-22

## 2025-06-22 RX ORDER — CLOPIDOGREL BISULFATE 75 MG/1
75 TABLET ORAL DAILY
Qty: 21 TABLET | Refills: 0 | Status: SHIPPED | OUTPATIENT
Start: 2025-06-23 | End: 2025-07-14

## 2025-06-22 RX ORDER — HYDRALAZINE HYDROCHLORIDE 25 MG/1
25 TABLET, FILM COATED ORAL EVERY 8 HOURS
Status: DISCONTINUED | OUTPATIENT
Start: 2025-06-22 | End: 2025-06-22 | Stop reason: HOSPADM

## 2025-06-22 RX ORDER — BISACODYL 10 MG/1
10 SUPPOSITORY RECTAL DAILY PRN
Status: DISCONTINUED | OUTPATIENT
Start: 2025-06-23 | End: 2025-06-22 | Stop reason: HOSPADM

## 2025-06-22 RX ADMIN — ATORVASTATIN CALCIUM 40 MG: 40 TABLET, FILM COATED ORAL at 09:06

## 2025-06-22 RX ADMIN — LEVOTHYROXINE SODIUM 125 MCG: 0.03 TABLET ORAL at 06:06

## 2025-06-22 RX ADMIN — ASPIRIN 81 MG: 81 TABLET ORAL at 09:06

## 2025-06-22 RX ADMIN — HYDRALAZINE HYDROCHLORIDE 25 MG: 25 TABLET ORAL at 01:06

## 2025-06-22 RX ADMIN — HYDROCHLOROTHIAZIDE 12.5 MG: 12.5 TABLET ORAL at 09:06

## 2025-06-22 RX ADMIN — CLOPIDOGREL 75 MG: 75 TABLET ORAL at 09:06

## 2025-06-22 RX ADMIN — LOSARTAN POTASSIUM 100 MG: 50 TABLET, FILM COATED ORAL at 09:06

## 2025-06-22 RX ADMIN — FAMOTIDINE 20 MG: 20 TABLET, FILM COATED ORAL at 09:06

## 2025-06-22 NOTE — PROGRESS NOTES
Pt discharged home. IV and telemetry removed. Discharge instructions reviewed. Pt voices understanding. Pt declined wheelchair assistance. Pt walked to private vehicle.

## 2025-06-22 NOTE — DISCHARGE SUMMARY
[facility] Medicine  Discharge Summary      Patient Name: Tato Weaver  MRN: [id number]  TONY: [id number]  Patient Class: OP- Observation  Admission Date: 6/20/2025  Hospital Length of Stay: 0 days  Discharge Date and Time: 06/22/2025 3:45 PM  Attending Physician: Tello Lai MD   Discharging Provider: GISEL Torres  Primary Care Provider: Lainey Hayden NP    Primary Care Team: Networked reference to record PCT     HPI:   59-year-old male presented to ED for eval of stroke-like symptoms. pMHx HTN, thyroid disease.  Patient reported around 7:00 a.m. this morning he had sudden onset dizziness lightheadedness when going from seated to standing position at a work meeting, with associated difficulty ambulating and diplopia.  Patient stated when ambulating it feels like he is drunk.  Patient denies other focal neuro deficits.  Denies history of stroke.  Denies history of similar symptoms.  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  In ED today, CTH impression with normal CT appearance of the brain. CTA H/N impression with no clinically significant stenosis  and no LVO. Tele stroke consult done in ED, TNK not recommended 2/2 mild non disabling symptoms, recs MRI brain and stroke/TIA work-up.  Patient loaded with aspirin and Plavix in ED. MRI brain pending. Admit to hospital medicine for further eval.      * No surgery found *      Hospital Course:   59-year-old male admitted for dizziness and lightheadedness.  Stroke workup was initiated.  MRI of brain negative.  CTA of head and neck without large vessel occlusion or significant carotid stenosis.  Echocardiogram was obtained revealed a negative bubble study,  normal ejection fraction 55-60% with normal diastolic dysfunction no valvular abnormalities.  Neurology was consulted.  Feels that lightheadedness and dizziness could be attributed to TIA versus hypertensive encephalopathy.  Recommends aspirin and Plavix for 21 days then aspirin thereafter.  Also recommended statin.  Patient will be maintained on both.  Patient's blood pressure was controlled with losartan 100 mg daily, hydrochlorothiazide 12.5 mg daily and hydralazine 25 mg t.i.d..  We will continue all medications on discharge.  Patient's symptoms have resolved at this time.  We will discharge patient home with PCP follow up as well as follow up to sleep Medicine for evaluation of sleep apnea as patient has apneic periods while sleeping and has uncontrolled hypertension.  Patient will also be set up with Holter monitor outpatient and can follow up with Cardiology for results.  Return to ER for any concerning symptoms.    Patient was evaluated on day of discharge.  Is complaints have resolved.  He is no longer having a dizziness chest pain lightheadedness or any other concerns at this time.  Blood pressure is controlled with medication changes as stated above.     Goals of Care Treatment Preferences:  Code Status: Full Code         Consults:   Consults (From admission, onward)          Status Ordering Provider     Inpatient Consult to Neurology Services (General Neurology)  Once        Provider:  Aleksey García MD    Completed BARBIE CULVER     Inpatient consult to Registered Dietitian/Nutritionist  Once        Provider:  (Not yet assigned)    Completed TERESITA RAMIREZ     IP consult to case management/social work  Once        Provider:  (Not yet assigned)    Completed TERESITA RAMIREZ     Inpatient Consult to Neurology Services (General Neurology)  Once        Provider:  (Not yet assigned)    Completed TERESITA RAMIREZ     Consult to Telemedicine - Acute Stroke  Once        Provider:  (Not yet assigned)     Completed JOVANNY VIVAR            Assessment & Plan  Stroke-like symptoms    Antithrombotics for secondary stroke prevention: Antiplatelets: Aspirin: 81 mg daily  Clopidogrel: 75 mg daily    Statins for secondary stroke prevention and hyperlipidemia, if present:   Statins: Atorvastatin- 40 mg daily    Aggressive risk factor modification: HTN, Obesity     Rehab efforts: The patient has been evaluated by a stroke team provider and the therapy needs have been fully considered based off the presenting complaints and exam findings. The following therapy evaluations are needed: PT evaluate and treat, OT evaluate and treat, SLP evaluate and treat    Diagnostics ordered/pending: CT scan of head without contrast to asses brain parenchyma, CTA Head to assess vasculature , CTA Neck/Arch to assess vasculature, HgbA1C to assess blood glucose levels, Lipid Profile to assess cholesterol levels, MRI head without contrast to assess brain parenchyma, TTE to assess cardiac function/status , TSH to assess thyroid function    VTE prophylaxis: Enoxaparin 40 mg SQ every 24 hours    BP parameters: TIA: SBP <220 until imaging confirmation of no infarct     Neuro checks  Orthostatic VS, not orthostatic  Consult neurology       Essential (primary) hypertension  Patient's blood pressure range in the last 24 hours was: BP  Min: 121/73  Max: 163/83.The patient's inpatient anti-hypertensive regimen is listed below:  Current Antihypertensives  labetaloL injection 10 mg, Every 15 min PRN, Intravenous  losartan tablet 100 mg, Daily, Oral  hydroCHLOROthiazide tablet 12.5 mg, Daily, Oral  hydrALAZINE injection 10 mg, Every 6 hours PRN, Intravenous  hydrALAZINE tablet 25 mg, Every 8 hours, Oral  hydrALAZINE (APRESOLINE) tablet, Every 8 hours, Oral    Plan  - BP is controlled, no changes needed to their regimen    Thyroid disease  TSH 0.8, check free T4  Resume home levothyroxine    Final Active Diagnoses:    Diagnosis Date Noted POA    PRINCIPAL  PROBLEM:  Stroke-like symptoms [R29.90] 06/20/2025 Yes    Thyroid disease [E07.9]  Yes    Essential (primary) hypertension [I10]  Yes      Problems Resolved During this Admission:       Discharged Condition: good    Disposition: Home or Self Care    Follow Up:   Follow-up Information       Lainey Hayden NP Follow up in 1 week(s).    Specialty: Family Medicine  Contact information:  [address]  [facility]  [place] [address]  [phone]               Prabhu Gupta MD Follow up in 1 week(s).    Specialties: Interventional Cardiology, Cardiology  Why: Ordered outpatient cardiac monitoring to evaluate for lightheadedness  Contact information:  [address]  [address]  [place] [address]  [phone]                           Patient Instructions:      Ambulatory referral/consult to Sleep Disorders   Standing Status: Future   Referral Priority: Routine Referral Type: Consultation   Requested Specialty: Sleep Medicine   Number of Visits Requested: 1     Diet Cardiac   Order Comments: See Stroke Patient Education Guide Booklet for details.     Call 911 for any of the following:   Order Comments: Call 911  right away if any of the following warning signs come on suddenly, even if the symptoms only last for a few minutes. With stroke, timing is very important.   - Warning Signs of Stroke:  - Weakness: You may feel a sudden weakness, tingling or loss of feeling on one side of your face or body.  - Vision Problems: You may have sudden double vision or trouble seeing in one or both eyes.  - Speech Problems: You may have sudden trouble talking, slured speech, or problems understanding others.  - Headache: You may have sudden, severe headache.  - Movement Problems: You may experience dizziness, a feeling of spinning, a loss of balance, a feeling of falling or blackouts.     Call MD for:  temperature >100.4     Call MD for:  persistent nausea and vomiting or diarrhea     Call MD for:   severe uncontrolled pain     Call MD for:     Call MD for:  increased confusion or weakness     Call MD for:  worsening rash     Call MD for:  difficulty breathing or increased cough     Call MD for:  redness, tenderness, or signs of infection (pain, swelling, redness, odor or green/yellow discharge around incision site)     Call MD for:  severe persistent headache     Call MD for:  persistent dizziness, light-headedness, or visual disturbances     30 Day Outpatient Telemetry   Standing Status: Future Standing Exp. Date: 06/22/26     Order Specific Question Answer Comments   Duration: 21 days    Release to patient Immediate        Significant Diagnostic Studies: Labs: All labs within the past 24 hours have been reviewed    Pending Diagnostic Studies:       Procedure Component Value Units Date/Time    EXTRA TUBES [[id number]]     Order Status: Sent Lab Status: No result     Specimen: Blood, Venous     Urinalysis, Reflex to Urine Culture Urine, Clean Catch [[id number]]     Order Status: Sent Lab Status: No result     Specimen: Urine     Vitamin B1 [[id number]] Collected: 06/21/25 1101    Order Status: Sent Lab Status: In process Updated: 06/21/25 1108    Specimen: Blood            Medications:  Reconciled Home Medications:      Medication List        START taking these medications      aspirin 81 MG EC tablet  Commonly known as: ECOTRIN  Take 1 tablet (81 mg total) by mouth once daily.  Start taking on: June 23, 2025     atorvastatin 40 MG tablet  Commonly known as: LIPITOR  Take 1 tablet (40 mg total) by mouth once daily.  Start taking on: June 23, 2025     clopidogreL 75 mg tablet  Commonly known as: PLAVIX  Take 1 tablet (75 mg total) by mouth once daily.  Start taking on: June 23, 2025     hydrALAZINE 25 MG tablet  Commonly known as: APRESOLINE  Take 1 tablet (25 mg total) by mouth every 8 (eight) hours.            CONTINUE taking these medications      hydroCHLOROthiazide 12.5 MG Tab  Take 1 tablet (12.5 mg  total) by mouth once daily.     losartan 100 MG tablet  Commonly known as: COZAAR  Take 1 tablet (100 mg total) by mouth once daily.     SYNTHROID 125 MCG tablet  Generic drug: levothyroxine  Take 125 mcg by mouth before breakfast.              Indwelling Lines/Drains at time of discharge:   Lines/Drains/Airways       None                       Time spent on the discharge of patient: 35 minutes         GISEL Torres  Department of Hospital Medicine  [facility]

## 2025-06-22 NOTE — ASSESSMENT & PLAN NOTE
Patient's blood pressure range in the last 24 hours was: BP  Min: 121/73  Max: 163/83.The patient's inpatient anti-hypertensive regimen is listed below:  Current Antihypertensives  labetaloL injection 10 mg, Every 15 min PRN, Intravenous  losartan tablet 100 mg, Daily, Oral  hydroCHLOROthiazide tablet 12.5 mg, Daily, Oral  hydrALAZINE injection 10 mg, Every 6 hours PRN, Intravenous  hydrALAZINE tablet 25 mg, Every 8 hours, Oral  hydrALAZINE (APRESOLINE) tablet, Every 8 hours, Oral    Plan  - BP is controlled, no changes needed to their regimen

## 2025-06-22 NOTE — PLAN OF CARE
Patient cleared for discharge from case management standpoint. Patient's spouse will transport him home.    Chart and discharge orders reviewed.  Patient discharged with no further case management needs.

## 2025-06-22 NOTE — DISCHARGE INSTRUCTIONS
We will discharge patient home with follow up to PCP and order for outpatient sleep study with sleep Medicine.  Patient also can return for outpatient cardiac Holter monitoring order has been put in.  Patient is also recommended to follow up with Cardiology to evaluate results of outpatient Holter monitor.    Continue aspirin and Plavix for 21 days then can stop Plavix and continue aspirin thereafter.  Continue Lipitor.    Continue antihypertensives outpatient.  Check your blood pressure throughout the day and keep log and bring to your next PCP or cardiology appointment.  Continue losartan 100 mg q.a.m., hydrochlorothiazide 12.5 mg q.a.m. and hydralazine 25 mg every 8 hours.

## 2025-06-23 ENCOUNTER — PATIENT OUTREACH (OUTPATIENT)
Dept: FAMILY MEDICINE | Facility: CLINIC | Age: 60
End: 2025-06-23
Payer: COMMERCIAL

## 2025-06-23 ENCOUNTER — HOSPITAL ENCOUNTER (EMERGENCY)
Facility: HOSPITAL | Age: 60
Discharge: HOME | End: 2025-06-23
Attending: EMERGENCY MEDICINE
Payer: COMMERCIAL

## 2025-06-23 ENCOUNTER — OFFICE VISIT (OUTPATIENT)
Dept: OTOLARYNGOLOGY | Facility: CLINIC | Age: 60
End: 2025-06-23
Payer: COMMERCIAL

## 2025-06-23 VITALS
HEIGHT: 71 IN | TEMPERATURE: 98 F | WEIGHT: 260 LBS | BODY MASS INDEX: 36.4 KG/M2 | RESPIRATION RATE: 18 BRPM | SYSTOLIC BLOOD PRESSURE: 160 MMHG | OXYGEN SATURATION: 94 % | HEART RATE: 70 BPM | DIASTOLIC BLOOD PRESSURE: 84 MMHG

## 2025-06-23 VITALS
HEART RATE: 73 BPM | SYSTOLIC BLOOD PRESSURE: 126 MMHG | BODY MASS INDEX: 36.39 KG/M2 | DIASTOLIC BLOOD PRESSURE: 87 MMHG | WEIGHT: 259.94 LBS | HEIGHT: 71 IN

## 2025-06-23 DIAGNOSIS — R42 DIZZINESS: Primary | ICD-10-CM

## 2025-06-23 DIAGNOSIS — R42 DIZZINESS: ICD-10-CM

## 2025-06-23 LAB — POCT GLUCOSE: 130 MG/DL (ref 70–110)

## 2025-06-23 PROCEDURE — 4010F ACE/ARB THERAPY RXD/TAKEN: CPT | Mod: CPTII,S$GLB,, | Performed by: OTOLARYNGOLOGY

## 2025-06-23 PROCEDURE — 3008F BODY MASS INDEX DOCD: CPT | Mod: CPTII,S$GLB,, | Performed by: OTOLARYNGOLOGY

## 2025-06-23 PROCEDURE — 99204 OFFICE O/P NEW MOD 45 MIN: CPT | Mod: S$GLB,,, | Performed by: OTOLARYNGOLOGY

## 2025-06-23 PROCEDURE — 1160F RVW MEDS BY RX/DR IN RCRD: CPT | Mod: CPTII,S$GLB,, | Performed by: OTOLARYNGOLOGY

## 2025-06-23 PROCEDURE — 99283 EMERGENCY DEPT VISIT LOW MDM: CPT

## 2025-06-23 PROCEDURE — 82962 GLUCOSE BLOOD TEST: CPT

## 2025-06-23 PROCEDURE — 1159F MED LIST DOCD IN RCRD: CPT | Mod: CPTII,S$GLB,, | Performed by: OTOLARYNGOLOGY

## 2025-06-23 PROCEDURE — 99999 PR PBB SHADOW E&M-EST. PATIENT-LVL III: CPT | Mod: PBBFAC,,, | Performed by: OTOLARYNGOLOGY

## 2025-06-23 PROCEDURE — 3079F DIAST BP 80-89 MM HG: CPT | Mod: CPTII,S$GLB,, | Performed by: OTOLARYNGOLOGY

## 2025-06-23 PROCEDURE — 3044F HG A1C LEVEL LT 7.0%: CPT | Mod: CPTII,S$GLB,, | Performed by: OTOLARYNGOLOGY

## 2025-06-23 PROCEDURE — 3074F SYST BP LT 130 MM HG: CPT | Mod: CPTII,S$GLB,, | Performed by: OTOLARYNGOLOGY

## 2025-06-23 PROCEDURE — 25000003 PHARM REV CODE 250: Performed by: EMERGENCY MEDICINE

## 2025-06-23 RX ORDER — LORAZEPAM 1 MG/1
1 TABLET ORAL
Status: COMPLETED | OUTPATIENT
Start: 2025-06-23 | End: 2025-06-23

## 2025-06-23 RX ORDER — DIAZEPAM 2 MG/1
TABLET ORAL
Qty: 8 TABLET | Refills: 0 | Status: SHIPPED | OUTPATIENT
Start: 2025-06-23

## 2025-06-23 RX ORDER — ONDANSETRON 4 MG/1
4 TABLET, ORALLY DISINTEGRATING ORAL
Status: COMPLETED | OUTPATIENT
Start: 2025-06-23 | End: 2025-06-23

## 2025-06-23 RX ORDER — MECLIZINE HYDROCHLORIDE 25 MG/1
25 TABLET ORAL 3 TIMES DAILY PRN
Qty: 20 TABLET | Refills: 0 | Status: SHIPPED | OUTPATIENT
Start: 2025-06-23

## 2025-06-23 RX ADMIN — LORAZEPAM 1 MG: 1 TABLET ORAL at 08:06

## 2025-06-23 RX ADMIN — ONDANSETRON 4 MG: 4 TABLET, ORALLY DISINTEGRATING ORAL at 08:06

## 2025-06-23 NOTE — ED PROVIDER NOTES
"Encounter Date: 6/23/2025       History     Chief Complaint   Patient presents with    Dizziness     Patient reports "feeling like I'm drunk"  Since Friday.  Here Friday and admitted for TIA discharged yesterday.  Pt reports "feeling bad still this morning so came here"  Pt reports "they were having trouble keeping my blood pressure down yesterday.  Took all medications this morning.       Patient presents complaining of dizziness.  Patient states he has a feeling of unsteadiness and feeling drunk.  Patient states he felt similar on Friday.  Saturday while in the hospital he felt improved and yesterday he felt improved.  Symptoms returned this morning.  Patient had recent workup including MRI, CT head, CTA.  No acute stroke was found on imaging however there was concern for possible TIA.  Patient was advised neurology follow up.  He denies any one-sided numbness tingling or weakness.  He is able to walk although he feels unsteady.      Review of patient's allergies indicates:   Allergen Reactions    Lisinopril      Cough     Past Medical History:   Diagnosis Date    Essential (primary) hypertension     Kidney stones     Thyroid disease      Past Surgical History:   Procedure Laterality Date    CYSTOSCOPY WITH URETEROSCOPY, RETROGRADE PYELOGRAPHY, AND INSERTION OF STENT Left 11/2/2022    Procedure: CYSTOSCOPY, WITH RETROGRADE PYELOGRAM AND URETERAL STENT INSERTION;  Surgeon: Dayana An MD;  Location: [facility] OR;  Service: Urology;  Laterality: Left;    CYSTOURETEROSCOPY,WITH HOLMIUM LASER LITHOTRIPSY OF URETERAL CALCULUS Left 12/14/2022    Procedure: CYSTOURETEROSCOPY,WITH HOLMIUM LASER LITHOTRIPSY OF URETERAL CALCULUS;  Surgeon: Dayana An MD;  Location: [facility] OR;  Service: Urology;  Laterality: Left;    HERNIA REPAIR      kidney stones      KNEE ARTHROSCOPY W/ LASER      LASER LITHOTRIPSY Left 11/2/2022    Procedure: LITHOTRIPSY, USING LASER;  Surgeon: Dayana An MD;  Location: " [facility] OR;  Service: Urology;  Laterality: Left;    RETROGRADE PYELOGRAPHY Left 10/11/2022    Procedure: PYELOGRAM, RETROGRADE;  Surgeon: Dayana An MD;  Location: [facility] OR;  Service: Urology;  Laterality: Left;    URETERAL STENT PLACEMENT N/A 10/11/2022    Procedure: INSERTION, STENT, URETER;  Surgeon: Dayana An MD;  Location: [facility] OR;  Service: Urology;  Laterality: N/A;    URETEROSCOPIC REMOVAL OF URETERIC CALCULUS Left 11/2/2022    Procedure: REMOVAL, CALCULUS, URETER, URETEROSCOPIC;  Surgeon: Dayana An MD;  Location: [facility] OR;  Service: Urology;  Laterality: Left;     Family History   Problem Relation Name Age of Onset    No Known Problems Mother      Cancer Father      Cancer Sister      Heart attack Sister       Social History[1]  Review of Systems   All other systems reviewed and are negative.      Physical Exam     Initial Vitals [06/23/25 0727]   BP Pulse Resp Temp SpO2   (!) 163/99 72 18 97.8 °F (36.6 °C) 97 %      MAP       --         Physical Exam    Nursing note and vitals reviewed.  Constitutional: He appears well-developed and well-nourished. He is not diaphoretic. No distress.   HENT:   Head: Normocephalic and atraumatic.   Eyes: EOM are normal.   Neck: Neck supple.   Normal range of motion.  Cardiovascular:  Intact distal pulses.           Pulmonary/Chest: No respiratory distress.   Musculoskeletal:      Cervical back: Normal range of motion and neck supple.     Neurological: He is alert and oriented to person, place, and time. He has normal strength.   Vision-normal  Neglect-normal  Aphasia - normal  Pronator drift - normal  Cerebellum - normal    Patient has a normal gait   Skin: Skin is warm and dry.   Psychiatric: He has a normal mood and affect. His behavior is normal. Judgment and thought content normal.         ED Course   Procedures  Labs Reviewed   POCT GLUCOSE - Abnormal       Result Value    POCT Glucose 130 (*)           Imaging Results    None           Medications   LORazepam tablet 1 mg (1 mg Oral Given 6/23/25 0812)   ondansetron disintegrating tablet 4 mg (4 mg Oral Given 6/23/25 0812)     Medical Decision Making  Patient presenting with dizziness    Patient with recent ED workup for similar complaint and hospital admission.  Patient with recent CT head, CTA head and neck, MRI.  Patient with recent labs and EKG.  Patient's symptoms are similar.  He does have a normal gait and there is no focal signs of any neurological deficit.  I offered that this additionally could be considered as a possible vertigo type of the event.  I did advise he should continue to seek neurology follow up and offered meclizine in addition to his current treatment.  He did get some symptomatic relief with Zofran and Ativan in the emergency department.  Patient will be discharged in stable condition.  Detailed return precautions discussed.    Risk  Prescription drug management.                                      Clinical Impression:  Final diagnoses:  [R42] Dizziness (Primary)          ED Disposition Condition    Discharge Stable          ED Prescriptions       Medication Sig Dispense Start Date End Date Auth. Provider    meclizine (ANTIVERT) 25 mg tablet Take 1 tablet (25 mg total) by mouth 3 (three) times daily as needed. 20 tablet 6/23/2025 -- Pablo Guerrero MD          Follow-up Information       Follow up With Specialties Details Why Contact Info    Lainey Hayden NP Family Medicine Schedule an appointment as soon as possible for a visit in 2 days  [address] [address]  [phone]      Mack Lees MD Otolaryngology Schedule an appointment as soon as possible for a visit in 2 days  [address] [facility] [address]  [phone]                     [1]   Social History  Tobacco Use    Smoking status: Former     Current packs/day: 0.00     Average packs/day: 1 pack/day for 31.0 years (31.0 ttl pk-yrs)      Types: Cigarettes     Start date: 1979     Quit date: 2010     Years since quitting: 15.4    Smokeless tobacco: Never   Vaping Use    Vaping status: Never Used   Substance Use Topics    Alcohol use: Yes    Drug use: Never        Pablo Guerrero MD  06/23/25 1007

## 2025-06-23 NOTE — PROGRESS NOTES
"Subjective:       Patient ID: Tato Weaver is a 59 y.o. male.    Chief Complaint: Dizziness (Patient here with c/o " dizziness." Been to ER three times in the last 4 days. His BP has been uncontrolled. He was advised to follow up with Neuro. )      This gentleman was in the emergency room earlier today and also on Friday which was three days ago for sudden onset of dizziness.  He did record a significant level of hypertension and to some extent his dizziness was attributed to that but the dizziness has persisted and in fact is pretty prominent just sitting in the office and he has has a significant workup and including radiology of his head and initial EKG and cardiac workup and that is all proven to be normal.  Just sitting in the chair in our office he tells me the room spinning his eyes do not feel stable and he is nauseated.  He does not have a headache that is not prone to headaches.  His blood pressure is 120/90 something today in the office clearly not enough to explain feeling badly and being dizzy.  He does not have a specific head position that instigate the dizziness and it has a vertigo feeling there some spinning involved.  It does not think his hearing has changed any just recently in conjunction with this recent event.          Objective:      ENT Physical Exam    His tympanic membranes and ear canals are normal his nasal exam is unremarkable his oropharynx is normal it does not look like he feels good and he confirms that in that he feels nauseated.  He tells me he is actively dizzy just sitting here in the office.        Assessment:       1. Dizziness         Plan:          So I think it isn't important that his workup has not involved checking for intracranial a abnormalities and cursory cardiologic evaluation and he is scheduled for cardiology visits soon     Certain characteristics are inner ear in that he is dizzy just lying in bed and sitting here in his blood pressure is for the most " part normal as we discuss this just sitting in the office at this moment.      I have talked him about positional vertigo and his dizziness is not specifically precipitated by head changes or head movements although in some cases that does precipitated but that is very little to lose from beginning with the home Epley maneuvers.  I have gone through the pathophysiology of positional vertigo and the way that home Epley maneuvers or help for that his wife seems familiar with those I have given them sheets that I think are reasonable version of that repositioning maneuver for the left and right ear instructed him to do one side and then a couple of hours later do the other side and to make sure both of the sequences of maneuvers has been completed at least two or 3 hours before lying down.  He is going to let me know if this has not been helpful he is having enough vertigo right now that I sent in a few Valium for him to take while this improves hopefully with the help of the home Epley maneuvers.    I reviewed his recent emergency room visits including some of the scans and x-rays the MRI scan in the EKG to be sure there was not anything that was not reported by the patient and that is all seemed to be normal and do not explain his dizziness.

## 2025-06-24 ENCOUNTER — PATIENT OUTREACH (OUTPATIENT)
Dept: ADMINISTRATIVE | Facility: CLINIC | Age: 60
End: 2025-06-24
Payer: COMMERCIAL

## 2025-06-25 ENCOUNTER — TELEPHONE (OUTPATIENT)
Dept: FAMILY MEDICINE | Facility: CLINIC | Age: 60
End: 2025-06-25
Payer: COMMERCIAL

## 2025-06-25 NOTE — TELEPHONE ENCOUNTER
Called pt to inform him that [facility] does not have any openings but he can be seen at the same clinic by a different provider. Patient said he was not interested and was just going to wait until 7/01/2025 to be seen by [facility]. Informed pt to call us back if he changes his mind or if symptoms worsen and he cannot wait then to go to the hospital. Verbal understanding expressed.

## 2025-06-25 NOTE — TELEPHONE ENCOUNTER
----- Message from Nurse Portillo sent at 6/24/2025 11:11 AM CDT -----    ----- Message -----  From: Coreen Marks  Sent: 6/24/2025   8:44 AM CDT  To: [facility] Staff    Patient calling In regarding to being in the hospital for the past 7 days and unable to wait until his hospital f/u date. Seeing if he could move the date up due to feeling symptoms of dizziness, unable to walk straight, feeling like he is going to fall over.   [phone]

## 2025-06-26 LAB — VIT B1 BLD-SCNC: 101.8 NMOL/L (ref 66.5–200)

## 2025-06-30 ENCOUNTER — HOSPITAL ENCOUNTER (OUTPATIENT)
Dept: CARDIOLOGY | Facility: CLINIC | Age: 60
Discharge: HOME | End: 2025-06-30
Attending: NURSE PRACTITIONER
Payer: COMMERCIAL

## 2025-06-30 DIAGNOSIS — R29.90 STROKE-LIKE SYMPTOMS: ICD-10-CM

## 2025-06-30 DIAGNOSIS — I10 ESSENTIAL (PRIMARY) HYPERTENSION: ICD-10-CM

## 2025-07-01 ENCOUNTER — OFFICE VISIT (OUTPATIENT)
Dept: FAMILY MEDICINE | Facility: CLINIC | Age: 60
End: 2025-07-01
Payer: COMMERCIAL

## 2025-07-01 VITALS
HEART RATE: 92 BPM | SYSTOLIC BLOOD PRESSURE: 108 MMHG | OXYGEN SATURATION: 97 % | TEMPERATURE: 99 F | BODY MASS INDEX: 35.3 KG/M2 | DIASTOLIC BLOOD PRESSURE: 70 MMHG | WEIGHT: 253.06 LBS

## 2025-07-01 DIAGNOSIS — I10 ESSENTIAL (PRIMARY) HYPERTENSION: ICD-10-CM

## 2025-07-01 DIAGNOSIS — Z09 HOSPITAL DISCHARGE FOLLOW-UP: Primary | ICD-10-CM

## 2025-07-01 DIAGNOSIS — E07.9 THYROID DISEASE: ICD-10-CM

## 2025-07-01 PROCEDURE — 99999 PR PBB SHADOW E&M-EST. PATIENT-LVL IV: CPT | Mod: PBBFAC,,,

## 2025-07-01 NOTE — PROGRESS NOTES
SUBJECTIVE:    Patient ID: Tato Weaver is a 59 y.o. male.    Chief Complaint: Hospital Follow Up (Still light headed but not nearly as bad)    History of Present Illness    CHIEF COMPLAINT:  Tato presents for follow-up after a recent hospitalization for severe dizziness and ongoing concerns about persistent brain fog and fatigue.    HPI:  Tato was recently hospitalized due to sudden onset of severe dizziness, extreme vertigo, difficulty maintaining balance, and visual disturbances. He was evaluated for a stroke in the ER with CTA and MRI. Although imaging results were negative, neurologists concluded he had a TIA based on his symptoms.    During his 2-day hospitalization, his dizziness improved significantly. He developed persistent cognitive impairment, described as feeling disoriented and having difficulty concentrating, which has become his main concern. Tato also reports significant fatigue.    The cognitive impairment worsens with exertion or movement. This morning, patient had a brief period without cognitive impairment for about an hour upon waking, but it returned once he became active. At that time, his blood pressure was low at 100/45. After hydrating and resting for 30 minutes, his blood pressure normalized.    Tato returned to the ER a second time due to persistent dizziness and was referred to an ENT to evaluate for possible inner ear vertigo. The ENT felt symptoms were inconsistent with vertigo but prescribed a few days of Valium and performed the Epley maneuver several times. The dizziness has since resolved, but the cognitive impairment and fatigue persist.    Tato has been taking meclizine (Antivert) 25mg 3 times daily since being evaluated by the ENT, as well as the prescribed Valium. He reports significant drowsiness, which may be attributed to these medications.    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 He has been off work since the onset of his symptoms.    Prior to this episode, patient established care with this provider in May for hypertension, with blood pressure readings in the 160s. He was started on losartan, later increased to 100mg daily, and HCTZ 12.5mg daily was added. Tato was in the process of switching from Synthroid to levothyroxine for cost reasons, but this change had not been implemented before his hospitalization.    During his hospital stay, patient's TSH was noted to be on the lower end of normal. His A1C was 6.1, indicating pre-diabetes. He currently consumes 2 cups of coffee in the morning, or 24 oz when he goes to work.    Tato is wearing a SpectraRepo monitor for cardiac monitoring, which was placed yesterday. He is scheduled to wear it for 14 days, then get another one for 7 days before being evaluated by the cardiologist. He is currently taking aspirin and Plavix as recommended by the hospital for 21 days post-discharge.    Tato mentions that about a week before the onset of his symptoms, he had taken a family trip to [place], which involved significant altitude changes from sea level to 12,000 feet.    Tato denies current dizziness, chest pain, chest tightness, palpitations, headac  hes, or swelling.    MEDICATIONS:  Tato is on Losartan 100 mg daily in the morning and HCTZ (Hydrochlorothiazide) 12.5 mg daily for hypertension. He takes Synthroid 125 mcg daily for a thyroid condition. Aspirin and Plavix are taken daily for TIA prevention. Meclizine 25 mg is taken 3 times daily for dizziness since hospital discharge. The Losartan dose was increased from 50 mg to 100 mg daily. HCTZ 12.5 mg was added about 2 weeks before hospitalization. Hydralazine 25 mg every 8 hours was added during the hospital stay. Tato completed a short course of Valium prescribed by ENT.    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 His TSH was checked 11 days ago and was on the lower end of normal. An echocardiogram showed an EF of 55-60% with negative/normal results. Tato started wearing a Zole monitor yesterday, which he will wear for 14 days, followed by another 7 days. MRI head: Negative for acute findings  CT head: Negative for acute stenosis or occlusion  CTA head and neck: Carotids clear    MEDICAL HISTORY:  Tato has a history of hypertension and pre-diabetes, with an A1C of 6.1 mentioned during his hospital stay.    FAMILY HISTORY:  Family history is significant for father and uncle (father's brother) who both  of primary brain tumors.       Review of Systems   Constitutional:  Positive for fatigue. Negative for appetite change, chills, fever and unexpected weight change.   HENT:  Negative for congestion, ear pain and sore throat.    Eyes:  Negative for photophobia, pain and visual disturbance.   Respiratory:  Negative for cough, shortness of breath and wheezing.    Cardiovascular:  Negative for chest pain, palpitations and leg swelling.   Gastrointestinal:  Negative for abdominal pain, constipation, diarrhea, nausea and vomiting.   Endocrine: Negative for cold intolerance, heat intolerance and polydipsia.   Genitourinary:  Negative for difficulty urinating, dysuria and hematuria.   Musculoskeletal:  Negative for arthralgias and myalgias.   Skin:  Negative for color change, rash and wound.   Neurological:  Negative for dizziness, syncope, weakness and headaches.   Hematological:  Negative for adenopathy. Does not bruise/bleed easily.   Psychiatric/Behavioral:  Negative for decreased concentration, dysphoric mood, hallucinations, self-injury, sleep disturbance and suicidal ideas. The patient is not nervous/anxious.         Brain Fog       Admit Date: 2025  Discharge Date: 25  Discharge Facility: Hospital      Family and/or Caretaker present at visit? Yes Wife present  Medication  Reconciliation:  Medications changed/added/deleted. Start ASA 81 mg, atorvastatin 40 mg daily, Plavix 75 mg daily, hydralazine 25 mg t.i.d.,   New Prescriptions filled after discharge: yes  Discharge summary reviewed:  yes  Diagnostic tests reviewed/disposition: I have reviewed all completed as well as pending diagnostic tests at the time of discharge  Holter monitor for 3 weeks.  Disease/illness education: HTN and TIA  Follow up appointments scheduled:  Yes, with ENT.   Awaiting to schedule an appointment with Cardiology once Holter monitor has been completed.   Pending referral for Sleep study for possible LORI- Patient defers at this time.                 Follow up labs/tests ordered:   yes  Home Health ordered on discharge: Patient does not have home health established from hospital visit.  They do not need home health.  If needed, we will set up home health for the patient  Home Health company name: n/a  Establishment or re-establishment of referral orders for community resources: No other necessary community resources  DME ordered at discharge:   not applicable  How patient is feeling since discharge from the hospital?  Reports that he feels Foggy at times and fatigue, dizziness has resolved.      Discussion with other health care providers: No discussion with other health care providers necessary  Patient follow up phone call documented on separate encounter.    Admission on 06/23/2025, Discharged on 06/23/2025   Component Date Value Ref Range Status    POCT Glucose 06/23/2025 130 (H)  70 - 110 mg/dL Final   No results displayed because visit has over 200 results.          Past Medical History:   Diagnosis Date    Essential (primary) hypertension     Kidney stones     Thyroid disease      Current Medications[1]  Review of patient's allergies indicates:   Allergen Reactions    Lisinopril      Cough      Past Surgical History:   Procedure Laterality Date    CYSTOSCOPY WITH URETEROSCOPY, RETROGRADE PYELOGRAPHY,  AND INSERTION OF STENT Left 11/2/2022    Procedure: CYSTOSCOPY, WITH RETROGRADE PYELOGRAM AND URETERAL STENT INSERTION;  Surgeon: Dayana An MD;  Location: [facility] OR;  Service: Urology;  Laterality: Left;    CYSTOURETEROSCOPY,WITH HOLMIUM LASER LITHOTRIPSY OF URETERAL CALCULUS Left 12/14/2022    Procedure: CYSTOURETEROSCOPY,WITH HOLMIUM LASER LITHOTRIPSY OF URETERAL CALCULUS;  Surgeon: Dayana An MD;  Location: [facility] OR;  Service: Urology;  Laterality: Left;    HERNIA REPAIR      kidney stones      KNEE ARTHROSCOPY W/ LASER      LASER LITHOTRIPSY Left 11/2/2022    Procedure: LITHOTRIPSY, USING LASER;  Surgeon: Dayana An MD;  Location: [facility] OR;  Service: Urology;  Laterality: Left;    RETROGRADE PYELOGRAPHY Left 10/11/2022    Procedure: PYELOGRAM, RETROGRADE;  Surgeon: Dayana An MD;  Location: [facility] OR;  Service: Urology;  Laterality: Left;    URETERAL STENT PLACEMENT N/A 10/11/2022    Procedure: INSERTION, STENT, URETER;  Surgeon: Dayana An MD;  Location: [facility] OR;  Service: Urology;  Laterality: N/A;    URETEROSCOPIC REMOVAL OF URETERIC CALCULUS Left 11/2/2022    Procedure: REMOVAL, CALCULUS, URETER, URETEROSCOPIC;  Surgeon: Dayana An MD;  Location: [facility] OR;  Service: Urology;  Laterality: Left;     Social History     Socioeconomic History    Marital status:    Tobacco Use    Smoking status: Former     Current packs/day: 0.00     Average packs/day: 1 pack/day for 31.0 years (31.0 ttl pk-yrs)     Types: Cigarettes     Start date: 1979     Quit date: 2010     Years since quitting: 15.5    Smokeless tobacco: Never   Vaping Use    Vaping status: Never Used   Substance and Sexual Activity    Alcohol use: Yes    Drug use: Never     Social Drivers of Health     Financial Resource Strain: Patient Declined (6/20/2025)    Overall Financial Resource Strain (CARDIA)     Difficulty of Paying Living Expenses: Patient declined   Food  Insecurity: Patient Declined (6/20/2025)    Hunger Vital Sign     Worried About Running Out of Food in the Last Year: Patient declined     Ran Out of Food in the Last Year: Patient declined   Transportation Needs: Patient Declined (6/20/2025)    PRAPARE - Transportation     Lack of Transportation (Medical): Patient declined     Lack of Transportation (Non-Medical): Patient declined   Physical Activity: Unknown (5/25/2025)    Exercise Vital Sign     Days of Exercise per Week: 5 days   Stress: Patient Declined (6/20/2025)    [employer] [place] [employer] - Occupational Stress Questionnaire     Feeling of Stress : Patient declined   Housing Stability: Patient Declined (6/20/2025)    Housing Stability Vital Sign     Unable to Pay for Housing in the Last Year: Patient declined     Number of Times Moved in the Last Year: 0     Homeless in the Last Year: Patient declined     Family History   Problem Relation Name Age of Onset    No Known Problems Mother      Cancer Father      Cancer Sister      Heart attack Sister            OBJECTIVE:      Vitals:    07/01/25 1554   BP: 108/70   BP Location: Right arm   Patient Position: Sitting   Pulse: 92   Temp: 98.6 °F (37 °C)   TempSrc: Oral   SpO2: 97%   Weight: 114.8 kg (253 lb 1.4 oz)     Physical Exam  Vitals and nursing note reviewed.   Constitutional:       General: He is not in acute distress.     Appearance: Normal appearance. He is well-developed. He is obese. He is not ill-appearing or toxic-appearing.      Comments: BMI 36   HENT:      Head: Normocephalic and atraumatic.      Right Ear: External ear normal.      Left Ear: External ear normal.      Nose: Nose normal. No congestion or rhinorrhea.      Mouth/Throat:      Mouth: Mucous membranes are moist.      Pharynx: Oropharynx is clear. No oropharyngeal exudate or posterior oropharyngeal erythema.   Eyes:      General: No scleral icterus.        Right eye: No discharge.         Left eye: No discharge.       Extraocular Movements: Extraocular movements intact.      Conjunctiva/sclera: Conjunctivae normal.      Pupils: Pupils are equal, round, and reactive to light.   Neck:      Thyroid: No thyroid mass or thyromegaly.      Trachea: Trachea normal.   Cardiovascular:      Rate and Rhythm: Normal rate and regular rhythm.      Pulses: Normal pulses.      Heart sounds: Normal heart sounds. No murmur heard.     Comments: Blood pressure 162/100  Pulmonary:      Effort: Pulmonary effort is normal. No respiratory distress.      Breath sounds: Normal breath sounds. No wheezing.   Musculoskeletal:         General: Normal range of motion.      Cervical back: Normal range of motion and neck supple. No tenderness.      Right lower leg: No edema.      Left lower leg: No edema.   Lymphadenopathy:      Cervical: No cervical adenopathy.   Skin:     General: Skin is warm and dry.      Coloration: Skin is not pale.      Findings: No erythema or rash.   Neurological:      Mental Status: He is alert and oriented to person, place, and time.   Psychiatric:         Mood and Affect: Mood is not anxious.         Speech: Speech normal.         Behavior: Behavior normal.         Thought Content: Thought content normal.         Cognition and Memory: Cognition normal.         Judgment: Judgment normal.            Assessment:       1. Hospital discharge follow-up    2. Thyroid disease    3. Essential (primary) hypertension        Plan:       Assessment & Plan    PLAN SUMMARY:   Discontinued Meclizine and Valium   Changed Hydralazine from scheduled dosing to as needed for systolic BP above 160   Discontinued Valium (already completed course)   Ordered thyroid function tests to be completed in 6 weeks   Continue Losartan 100 mg daily in the morning and HCTZ 12.5 mg daily   Continue aspirin and Plavix for remainder of 21-day course, then continue aspirin alone   Changed from Synthroid to Levothyroxine 125 mcg daily due to cost   Follow up in 1 week to  reassess symptoms and discuss return to work    ## TRANSIENT ISCHEMIC ATTACK (TIA):   Tato experienced dizziness, inability to walk a straight line, and feeling drunk, which led to a hospital visit.   Neurologists suspected a TIA based on symptoms, though MRI and CTA were negative for acute stenosis or occlusion.   They considered TIA versus hypertensive encephalopathy.   Continue aspirin and Plavix for remainder of 21-day course, then continue aspirin alone thereafter.    ## HYPERTENSION:   Tato was extremely hypertensive in May with blood pressure in the 160s, posing risks of stroke or heart attack.   Current blood pressure is 108/70, with home readings around 100/45 that improved after hydration and rest.   Explained how rapidly lowering BP after long-term hypertension can cause fatigue and cognitive symptoms.   Continue Losartan 100 mg daily in the morning and HCTZ 12.5 mg daily.   Changed Hydralazine from scheduled dosing to as needed for systolic BP above 160.   Instructed the patient to monitor BP at home daily unless symptomatic, aiming for below 130/80, checking after taking medications and avoiding caffeine beforehand.    ## PREDIABETES:   Tato's A1C was 6.1 in the hospital, indicating prediabetes.   Educated the patient on prediabetes and importance of watching carbohydrate and sugar intake.    ## HYPOTHYROIDISM:   Tato's TSH was on the lower end of normal, not overmedicated.   Changed from Synthroid to Levothyroxine 125 mcg daily due to cost.   Ordered thyroid function tests to be completed in 6 weeks.    ## DIZZINESS AND SYNCOPE:   Tato experienced dizziness and inability to walk a straight line, leading to a hospital visit.   The dizziness has now resolved.   Discontinued Meclizine and Valium, which were causing sedation.    ## COGNITIVE DYSFUNCTION:   Tato experiences brain fog, especially after exertion.   This is likely attributable to the rapid lowering of blood pressure after long-term  hypertension and the sedating effects of medications.   Discontinued Meclizine and Valium which could be contributing to cognitive symptoms.    ## FATIGUE:   Tato reports feeling tired, especially after exertion.   This is likely due to low blood pressure, sedating medications, and the recovery process post-hospitalization.   Explained that typically recovery takes about 3 days for each day spent in the hospital.   Discontinued Meclizine and Valium which could be contributing to fatigue.    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   Follow up in 1 week to reassess symptoms and discuss return to work.   Will reassess need for short-term disability paperwork at that time.    ## GENERAL RECOMMENDATIONS:   Advised the patient to wear compression socks for long trips to prevent blood clots.   Tato to stay hydrated and avoid overexertion and heat exposure during recovery period.   Discontinued Valium (already completed course).        Hospital discharge follow-up    Thyroid disease  -     levothyroxine (SYNTHROID) 125 MCG tablet; Take 1 tablet (125 mcg total) by mouth before breakfast.  Dispense: 90 tablet; Refill: 0  -     TSH; Future; Expected date: 08/05/2025  -     T4, Free; Future; Expected date: 08/05/2025    Essential (primary) hypertension  -     hydrALAZINE (APRESOLINE) 25 MG tablet; Take 1 tablet (25 mg total) by mouth every 8 (eight) hours as needed (SYS >160).  Dispense: 90 tablet; Refill: 0        Follow up in about 1 week (around 7/8/2025) for poss work release .      7/3/2025 ESTEFANIA Ellison, GISEL    This note was generated with the assistance of ambient listening technology. Verbal consent was obtained by the patient and accompanying visitor(s) for the recording of patient appointment to facilitate this note. I attest to having reviewed and edited the generated note for accuracy, though some syntax or spelling errors may persist.  Please contact the author of this note for any clarification.            [1]   Current Outpatient Medications   Medication Sig Dispense Refill    aspirin (ECOTRIN) 81 MG EC tablet Take 1 tablet (81 mg total) by mouth once daily. 30 tablet 0    atorvastatin (LIPITOR) 40 MG tablet Take 1 tablet (40 mg total) by mouth once daily. 90 tablet 3    clopidogreL (PLAVIX) 75 mg tablet Take 1 tablet (75 mg total) by mouth once daily. for 21 days 21 tablet 0    hydroCHLOROthiazide 12.5 MG Tab Take 1 tablet (12.5 mg total) by mouth once daily. 90 tablet 0    losartan (COZAAR) 100 MG tablet Take 1 tablet (100 mg total) by mouth once daily. 90 tablet 0    hydrALAZINE (APRESOLINE) 25 MG tablet Take 1 tablet (25 mg total) by mouth every 8 (eight) hours as needed (SYS >160). 90 tablet 0    levothyroxine (SYNTHROID) 125 MCG tablet Take 1 tablet (125 mcg total) by mouth before breakfast. 90 tablet 0     No current facility-administered medications for this visit.

## 2025-07-03 RX ORDER — LEVOTHYROXINE SODIUM 125 UG/1
125 TABLET ORAL
Qty: 90 TABLET | Refills: 0 | Status: SHIPPED | OUTPATIENT
Start: 2025-07-03

## 2025-07-03 RX ORDER — HYDRALAZINE HYDROCHLORIDE 25 MG/1
25 TABLET, FILM COATED ORAL EVERY 8 HOURS PRN
Qty: 90 TABLET | Refills: 0 | Status: SHIPPED | OUTPATIENT
Start: 2025-07-03

## 2025-07-08 ENCOUNTER — OFFICE VISIT (OUTPATIENT)
Dept: FAMILY MEDICINE | Facility: CLINIC | Age: 60
End: 2025-07-08
Payer: COMMERCIAL

## 2025-07-08 VITALS
SYSTOLIC BLOOD PRESSURE: 118 MMHG | WEIGHT: 257.25 LBS | HEART RATE: 91 BPM | BODY MASS INDEX: 35.88 KG/M2 | OXYGEN SATURATION: 97 % | DIASTOLIC BLOOD PRESSURE: 76 MMHG

## 2025-07-08 DIAGNOSIS — R42 VERTIGO: ICD-10-CM

## 2025-07-08 DIAGNOSIS — I10 ESSENTIAL (PRIMARY) HYPERTENSION: Primary | ICD-10-CM

## 2025-07-08 DIAGNOSIS — E07.9 THYROID DISEASE: ICD-10-CM

## 2025-07-08 DIAGNOSIS — Z23 NEED FOR TETANUS BOOSTER: ICD-10-CM

## 2025-07-08 PROCEDURE — 1160F RVW MEDS BY RX/DR IN RCRD: CPT | Mod: CPTII,S$GLB,,

## 2025-07-08 PROCEDURE — 1159F MED LIST DOCD IN RCRD: CPT | Mod: CPTII,S$GLB,,

## 2025-07-08 PROCEDURE — 90715 TDAP VACCINE 7 YRS/> IM: CPT | Mod: S$GLB,,,

## 2025-07-08 PROCEDURE — 90471 IMMUNIZATION ADMIN: CPT | Mod: S$GLB,,,

## 2025-07-08 PROCEDURE — 3078F DIAST BP <80 MM HG: CPT | Mod: CPTII,S$GLB,,

## 2025-07-08 PROCEDURE — 3074F SYST BP LT 130 MM HG: CPT | Mod: CPTII,S$GLB,,

## 2025-07-08 PROCEDURE — 99999 PR PBB SHADOW E&M-EST. PATIENT-LVL III: CPT | Mod: PBBFAC,,,

## 2025-07-08 PROCEDURE — 4010F ACE/ARB THERAPY RXD/TAKEN: CPT | Mod: CPTII,S$GLB,,

## 2025-07-08 PROCEDURE — 3008F BODY MASS INDEX DOCD: CPT | Mod: CPTII,S$GLB,,

## 2025-07-08 PROCEDURE — 3044F HG A1C LEVEL LT 7.0%: CPT | Mod: CPTII,S$GLB,,

## 2025-07-08 PROCEDURE — 99214 OFFICE O/P EST MOD 30 MIN: CPT | Mod: 25,S$GLB,,

## 2025-07-08 RX ORDER — LEVOTHYROXINE SODIUM 125 UG/1
125 TABLET ORAL
Qty: 90 TABLET | Refills: 0 | Status: CANCELLED | OUTPATIENT
Start: 2025-07-08

## 2025-07-08 RX ORDER — LEVOTHYROXINE SODIUM 125 UG/1
125 TABLET ORAL
Qty: 90 TABLET | Refills: 0 | Status: SHIPPED | OUTPATIENT
Start: 2025-07-08 | End: 2025-07-08

## 2025-07-08 RX ORDER — LEVOTHYROXINE SODIUM 125 UG/1
125 TABLET ORAL
Qty: 90 TABLET | Refills: 0 | Status: SHIPPED | OUTPATIENT
Start: 2025-07-08

## 2025-07-08 NOTE — LETTER
July 8, 2025      [facility]  [address]    [facility] [address]  Phone: [phone]  Fax: [phone]       Patient: Tato Weaver   YOB: 1965  Date of Visit: 07/08/2025    To Whom It May Concern:    Michele Weaver  was at [facility] on 07/08/2025. The patient may return to work on 7/9/2025 with restrictions. If Tato feels that he is getting over heated outside, please allow him to take a 15 minute break in a cool environment to cool off and hydrate. If you have any questions or concerns, or if I can be of further assistance, please do not hesitate to contact me.    Sincerely,          Lainey Hayden NP

## 2025-07-08 NOTE — PROGRESS NOTES
SUBJECTIVE:      Patient ID: Tato Weaver is a 59 y.o. male.    Chief Complaint: Dizziness and Hypertension    History of Present Illness    CHIEF COMPLAINT:  Tato presents for a follow-up visit to discuss blood pressure management and recent dizziness/vertigo symptoms.    HPI:  Tato presents today for a follow to see if he can return to work and HTN. Tato was recently hospitalized due to sudden onset of severe dizziness, extreme vertigo, difficulty maintaining balance, and visual disturbances. Last week he was still experiencing at times.Today reports improvement in his blood pressure and resolution of dizziness and vertigo symptoms. He feels well after mowing the entire yard yesterday, taking 15-minute breaks and maintaining hydration. His blood pressure was low this morning upon waking, causing nausea, but he feels fine now. Tato has been taking hydrochlorothiazide 12.5 mg daily, losartan 100 mg daily, and hydralazine as needed for systolic blood pressure above 160. He reports not needing to take his hydralazine. Tato also mentions anxiety and stress, which he thinks contributed to his symptoms. He has been making efforts to stay hydrated, especially when working outside in the heat.    He is also needing to have his Levothyroxine 125 mcg daily filled, It was refilled but it appears the pharmacy was trying to fill the name brand and he is wanting to transition to the generic.     MEDICATIONS:  Tato is on Hydrochlorothiazide 12.5 mg and Losartan 100 mg daily. He is also on Hydralazine every 8 hours as needed for systolic blood pressure above 160. Tato is trying to get Levothyroxine 125 mcg daily filled.    SOCIAL HISTORY:  Occupation: Road tech, works on vehicles in the field         Hypertension  Pertinent negatives include no chest pain, headaches, neck pain, palpitations or shortness of breath.      Review of Systems   Constitutional:  Negative for appetite change, chills, fatigue,  fever and unexpected weight change.   HENT:  Negative for congestion, ear pain and sore throat.    Eyes:  Negative for photophobia, pain and visual disturbance.   Respiratory:  Negative for cough, shortness of breath and wheezing.    Cardiovascular:  Negative for chest pain, palpitations and leg swelling.   Gastrointestinal:  Negative for abdominal pain, constipation, diarrhea, nausea and vomiting.   Endocrine: Negative for cold intolerance, heat intolerance and polydipsia.   Genitourinary:  Negative for difficulty urinating, dysuria and hematuria.   Musculoskeletal:  Negative for arthralgias, gait problem, joint swelling, myalgias, neck pain and neck stiffness.   Skin:  Negative for color change, rash and wound.   Neurological:  Negative for dizziness, syncope, weakness and headaches.   Hematological:  Negative for adenopathy. Does not bruise/bleed easily.   Psychiatric/Behavioral:  Negative for decreased concentration, dysphoric mood, hallucinations, self-injury, sleep disturbance and suicidal ideas. The patient is not nervous/anxious.        Past Medical History:   Diagnosis Date    Essential (primary) hypertension     Kidney stones     Thyroid disease      Current Medications[1]  Review of patient's allergies indicates:   Allergen Reactions    Lisinopril      Cough      Past Surgical History:   Procedure Laterality Date    CYSTOSCOPY WITH URETEROSCOPY, RETROGRADE PYELOGRAPHY, AND INSERTION OF STENT Left 11/2/2022    Procedure: CYSTOSCOPY, WITH RETROGRADE PYELOGRAM AND URETERAL STENT INSERTION;  Surgeon: Dayana An MD;  Location: [facility] OR;  Service: Urology;  Laterality: Left;    CYSTOURETEROSCOPY,WITH HOLMIUM LASER LITHOTRIPSY OF URETERAL CALCULUS Left 12/14/2022    Procedure: CYSTOURETEROSCOPY,WITH HOLMIUM LASER LITHOTRIPSY OF URETERAL CALCULUS;  Surgeon: Dayana An MD;  Location: [facility] OR;  Service: Urology;  Laterality: Left;    HERNIA REPAIR      kidney stones      KNEE ARTHROSCOPY  W/ LASER      LASER LITHOTRIPSY Left 11/2/2022    Procedure: LITHOTRIPSY, USING LASER;  Surgeon: Dayana An MD;  Location: [facility] OR;  Service: Urology;  Laterality: Left;    RETROGRADE PYELOGRAPHY Left 10/11/2022    Procedure: PYELOGRAM, RETROGRADE;  Surgeon: Dayana An MD;  Location: [facility] OR;  Service: Urology;  Laterality: Left;    URETERAL STENT PLACEMENT N/A 10/11/2022    Procedure: INSERTION, STENT, URETER;  Surgeon: Dayana An MD;  Location: [facility] OR;  Service: Urology;  Laterality: N/A;    URETEROSCOPIC REMOVAL OF URETERIC CALCULUS Left 11/2/2022    Procedure: REMOVAL, CALCULUS, URETER, URETEROSCOPIC;  Surgeon: Dayana An MD;  Location: [facility] OR;  Service: Urology;  Laterality: Left;     Social History     Socioeconomic History    Marital status:    Tobacco Use    Smoking status: Former     Current packs/day: 0.00     Average packs/day: 1 pack/day for 31.0 years (31.0 ttl pk-yrs)     Types: Cigarettes     Start date: 1979     Quit date: 2010     Years since quitting: 15.5    Smokeless tobacco: Never   Vaping Use    Vaping status: Never Used   Substance and Sexual Activity    Alcohol use: Yes    Drug use: Never     Social Drivers of Health     Financial Resource Strain: Patient Declined (6/20/2025)    Overall Financial Resource Strain (CARDIA)     Difficulty of Paying Living Expenses: Patient declined   Food Insecurity: Patient Declined (6/20/2025)    Hunger Vital Sign     Worried About Running Out of Food in the Last Year: Patient declined     Ran Out of Food in the Last Year: Patient declined   Transportation Needs: Patient Declined (6/20/2025)    PRAPARE - Transportation     Lack of Transportation (Medical): Patient declined     Lack of Transportation (Non-Medical): Patient declined   Physical Activity: Unknown (5/25/2025)    Exercise Vital Sign     Days of Exercise per Week: 5 days   Stress: Patient Declined (6/20/2025)    [facility] of  Occupational Health - Occupational Stress Questionnaire     Feeling of Stress : Patient declined   Housing Stability: Patient Declined (6/20/2025)    Housing Stability Vital Sign     Unable to Pay for Housing in the Last Year: Patient declined     Number of Times Moved in the Last Year: 0     Homeless in the Last Year: Patient declined     Family History   Problem Relation Name Age of Onset    No Known Problems Mother      Cancer Father      Cancer Sister      Heart attack Sister            OBJECTIVE:      Vitals:    07/08/25 0917   BP: 118/76   BP Location: Right arm   Patient Position: Sitting   Pulse: 91   SpO2: 97%   Weight: 116.7 kg (257 lb 4.4 oz)     Physical Exam  Vitals and nursing note reviewed.   Constitutional:       General: He is not in acute distress.     Appearance: Normal appearance. He is well-developed. He is not ill-appearing or toxic-appearing.   HENT:      Head: Normocephalic and atraumatic.      Right Ear: External ear normal.      Left Ear: External ear normal.      Nose: Nose normal. No congestion or rhinorrhea.      Mouth/Throat:      Mouth: Mucous membranes are moist.      Pharynx: Oropharynx is clear. No oropharyngeal exudate or posterior oropharyngeal erythema.   Eyes:      General: No scleral icterus.        Right eye: No discharge.         Left eye: No discharge.      Extraocular Movements: Extraocular movements intact.      Conjunctiva/sclera: Conjunctivae normal.      Pupils: Pupils are equal, round, and reactive to light.   Neck:      Thyroid: No thyroid mass or thyromegaly.      Trachea: Trachea normal.   Cardiovascular:      Rate and Rhythm: Normal rate and regular rhythm.      Pulses: Normal pulses.      Heart sounds: Normal heart sounds. No murmur heard.  Pulmonary:      Effort: Pulmonary effort is normal. No respiratory distress.      Breath sounds: Normal breath sounds. No wheezing.   Abdominal:      General: Bowel sounds are normal. There is no distension.      Palpations:  Abdomen is soft. There is no mass.      Tenderness: There is no abdominal tenderness. There is no right CVA tenderness, left CVA tenderness, guarding or rebound.      Hernia: No hernia is present.   Musculoskeletal:         General: Normal range of motion.      Cervical back: Normal range of motion and neck supple. No tenderness.      Right lower leg: No edema.      Left lower leg: No edema.   Lymphadenopathy:      Cervical: No cervical adenopathy.   Skin:     General: Skin is warm and dry.      Coloration: Skin is not pale.      Findings: No erythema or rash.   Neurological:      Mental Status: He is alert and oriented to person, place, and time.   Psychiatric:         Mood and Affect: Mood normal.         Behavior: Behavior normal.         Thought Content: Thought content normal.         Judgment: Judgment normal.            Assessment:       1. Essential (primary) hypertension    2. Thyroid disease    3. Need for tetanus booster    4. Vertigo        Plan:       Assessment & Plan    PLAN SUMMARY:   Order thyroid function labs to be completed 6-8 weeks after starting new prescription   Continue hydrochlorothiazide 12.5 mg daily and losartan 100 mg daily   Prescribe levothyroxine 125 mcg daily (generic form)   Continue hydralazine dosing to every 8 hours as needed for systolic BP above 160 (Which he has not needed)    Return in 2 months with labs completed a few days prior to appointment    HYPERTENSION:   Tato's blood pressure is well-controlled at 118/76.   Continue hydrochlorothiazide 12.5 mg daily and losartan 100 mg daily.   Changed hydralazine dosing to every 8 hours as needed for systolic BP above 160.   Advised patient to monitor sodium intake, particularly when not sweating heavily..    HYPOTHYROIDISM:   Discussed insurance coverage issues with Synthroid.   Prescribed levothyroxine 125 mcg daily (generic form).   Ordered thyroid function labs to be completed 6-8 weeks after starting new prescription, a  few days before next appointment.      OCCUPATIONAL EXPOSURE TO EXTREME TEMP:   Educated patient on heat safety protocols: stay hydrated before and during work, take cooling breaks every 2 hours or when feeling overheated for at least 15 minutes, and seek air-conditioned truck or cool area if experiencing dizziness.   Recommend using sugar-free Gatorade diluted with water for electrolyte replacement to avoid excessive sugar intake.   Discussed importance of recognizing hyperthermia signs and taking appropriate preventive measures.  -May return to work    FOLLOW-UP:   Return in 2 months with labs completed a few days prior to appointment.        Essential (primary) hypertension    Thyroid disease  -     Discontinue: levothyroxine (SYNTHROID) 125 MCG tablet; Take 1 tablet (125 mcg total) by mouth before breakfast.  Dispense: 90 tablet; Refill: 0  -     levothyroxine (SYNTHROID) 125 MCG tablet; Take 1 tablet (125 mcg total) by mouth before breakfast.  Dispense: 90 tablet; Refill: 0    Need for tetanus booster  -     Tdap (BOOSTRIX) vaccine injection 0.5 mL    Vertigo    I spent a total of 30 minutes on the day of the visit.  This includes face to face time and non-face to face time preparing to see the patient (eg, review of tests), obtaining and/or reviewing separately obtained history, documenting clinical information in the electronic or other health record, independently interpreting results and communicating results to the patient/family/caregiver, or care coordinator.      Follow up in about 2 months (around 9/8/2025) for labs in 8 weeks- thyroid and HTN.      7/8/2025 ESTEFANIA Ellison, GISEL  This note was generated with the assistance of ambient listening technology. Verbal consent was obtained by the patient and accompanying visitor(s) for the recording of patient appointment to facilitate this note. I attest to having reviewed and edited the generated note for accuracy, though some syntax or spelling  errors may persist. Please contact the author of this note for any clarification.              [1]   Current Outpatient Medications   Medication Sig Dispense Refill    aspirin (ECOTRIN) 81 MG EC tablet Take 1 tablet (81 mg total) by mouth once daily. 30 tablet 0    atorvastatin (LIPITOR) 40 MG tablet Take 1 tablet (40 mg total) by mouth once daily. 90 tablet 3    clopidogreL (PLAVIX) 75 mg tablet Take 1 tablet (75 mg total) by mouth once daily. for 21 days 21 tablet 0    hydrALAZINE (APRESOLINE) 25 MG tablet Take 1 tablet (25 mg total) by mouth every 8 (eight) hours as needed (SYS >160). 90 tablet 0    hydroCHLOROthiazide 12.5 MG Tab Take 1 tablet (12.5 mg total) by mouth once daily. 90 tablet 0    losartan (COZAAR) 100 MG tablet Take 1 tablet (100 mg total) by mouth once daily. 90 tablet 0    levothyroxine (SYNTHROID) 125 MCG tablet Take 1 tablet (125 mcg total) by mouth before breakfast. 90 tablet 0     No current facility-administered medications for this visit.

## 2025-07-09 ENCOUNTER — TELEPHONE (OUTPATIENT)
Dept: FAMILY MEDICINE | Facility: CLINIC | Age: 60
End: 2025-07-09
Payer: COMMERCIAL

## 2025-07-09 NOTE — TELEPHONE ENCOUNTER
----- Message from Coreen sent at 7/9/2025  3:49 PM CDT -----  [phone]  Calling to check on the status of his formes.  Left voicemail @ 4:24 pm

## 2025-07-10 ENCOUNTER — TELEPHONE (OUTPATIENT)
Dept: FAMILY MEDICINE | Facility: CLINIC | Age: 60
End: 2025-07-10
Payer: COMMERCIAL

## 2025-07-10 NOTE — TELEPHONE ENCOUNTER
----- Message from Med Assistant Tavera sent at 7/10/2025  8:47 AM CDT -----  Patient would like to know if he can come  his completed forms today.      [phone]

## 2025-07-10 NOTE — TELEPHONE ENCOUNTER
Called patient to advise his paperwork can be picked up at the . Patient expressed verbal understanding.

## 2025-07-15 ENCOUNTER — TELEPHONE (OUTPATIENT)
Dept: FAMILY MEDICINE | Facility: CLINIC | Age: 60
End: 2025-07-15
Payer: COMMERCIAL

## 2025-07-15 NOTE — TELEPHONE ENCOUNTER
Returned Call to patient to advise his paperwork is ready to be picked up at the . Verbal understanding was expressed.

## 2025-07-15 NOTE — TELEPHONE ENCOUNTER
----- Message from Ladi sent at 7/15/2025 11:16 AM CDT -----  The patient is calling to see if his paper is signed and dated.  It has to say no restrictions for him to go back to work. He wants to go back to work tomorrow. Pt's # [phone]

## 2025-08-04 ENCOUNTER — TELEPHONE (OUTPATIENT)
Dept: FAMILY MEDICINE | Facility: CLINIC | Age: 60
End: 2025-08-04
Payer: COMMERCIAL

## 2025-08-06 ENCOUNTER — LAB VISIT (OUTPATIENT)
Dept: LAB | Facility: HOSPITAL | Age: 60
End: 2025-08-06
Payer: COMMERCIAL

## 2025-08-06 DIAGNOSIS — E07.9 THYROID DISEASE: ICD-10-CM

## 2025-08-06 LAB
T4 FREE SERPL-MCNC: 1.31 NG/DL (ref 0.71–1.51)
TSH SERPL-ACNC: 0.44 UIU/ML (ref 0.4–4)

## 2025-08-06 PROCEDURE — 36415 COLL VENOUS BLD VENIPUNCTURE: CPT | Mod: PN

## 2025-08-06 PROCEDURE — 84439 ASSAY OF FREE THYROXINE: CPT

## 2025-08-06 PROCEDURE — 84443 ASSAY THYROID STIM HORMONE: CPT

## 2025-08-11 ENCOUNTER — OFFICE VISIT (OUTPATIENT)
Dept: FAMILY MEDICINE | Facility: CLINIC | Age: 60
End: 2025-08-11
Payer: COMMERCIAL

## 2025-08-11 VITALS
SYSTOLIC BLOOD PRESSURE: 110 MMHG | DIASTOLIC BLOOD PRESSURE: 68 MMHG | HEART RATE: 85 BPM | WEIGHT: 254.44 LBS | BODY MASS INDEX: 35.48 KG/M2 | OXYGEN SATURATION: 97 %

## 2025-08-11 DIAGNOSIS — I10 ESSENTIAL (PRIMARY) HYPERTENSION: Primary | ICD-10-CM

## 2025-08-11 DIAGNOSIS — E07.9 THYROID DISEASE: ICD-10-CM

## 2025-08-11 PROCEDURE — 1160F RVW MEDS BY RX/DR IN RCRD: CPT | Mod: CPTII,S$GLB,,

## 2025-08-11 PROCEDURE — 4010F ACE/ARB THERAPY RXD/TAKEN: CPT | Mod: CPTII,S$GLB,,

## 2025-08-11 PROCEDURE — 3044F HG A1C LEVEL LT 7.0%: CPT | Mod: CPTII,S$GLB,,

## 2025-08-11 PROCEDURE — 1159F MED LIST DOCD IN RCRD: CPT | Mod: CPTII,S$GLB,,

## 2025-08-11 PROCEDURE — G2211 COMPLEX E/M VISIT ADD ON: HCPCS | Mod: S$GLB,,,

## 2025-08-11 PROCEDURE — 99999 PR PBB SHADOW E&M-EST. PATIENT-LVL III: CPT | Mod: PBBFAC,,,

## 2025-08-11 PROCEDURE — 3074F SYST BP LT 130 MM HG: CPT | Mod: CPTII,S$GLB,,

## 2025-08-11 PROCEDURE — 3078F DIAST BP <80 MM HG: CPT | Mod: CPTII,S$GLB,,

## 2025-08-11 PROCEDURE — 99214 OFFICE O/P EST MOD 30 MIN: CPT | Mod: S$GLB,,,

## 2025-08-11 PROCEDURE — 3008F BODY MASS INDEX DOCD: CPT | Mod: CPTII,S$GLB,,

## 2025-08-11 RX ORDER — LEVOTHYROXINE SODIUM 112 UG/1
112 TABLET ORAL
Qty: 30 TABLET | Refills: 11 | Status: SHIPPED | OUTPATIENT
Start: 2025-08-11 | End: 2025-08-11

## 2025-08-11 RX ORDER — LOSARTAN POTASSIUM 100 MG/1
100 TABLET ORAL DAILY
Qty: 90 TABLET | Refills: 3 | Status: SHIPPED | OUTPATIENT
Start: 2025-08-11

## 2025-08-11 RX ORDER — LEVOTHYROXINE SODIUM 112 UG/1
112 TABLET ORAL
Qty: 90 TABLET | Refills: 3 | Status: SHIPPED | OUTPATIENT
Start: 2025-08-11

## 2025-08-11 RX ORDER — HYDROCHLOROTHIAZIDE 12.5 MG/1
12.5 TABLET ORAL DAILY
Qty: 90 TABLET | Refills: 3 | Status: SHIPPED | OUTPATIENT
Start: 2025-08-11

## (undated) DEVICE — GLOVE SURG ULTRA TOUCH 6

## (undated) DEVICE — CONTAINER SPECIMEN OR STER 4OZ

## (undated) DEVICE — GOWN POLY REINF BRTH SLV LG

## (undated) DEVICE — BOWL STERILE LARGE 32OZ

## (undated) DEVICE — DRAPE T CYSTOSCOPY STERILE

## (undated) DEVICE — SET IRR URLGY 2LINE UNIV SPIKE

## (undated) DEVICE — LEGGING CLEAR POLY 2/PACK

## (undated) DEVICE — JELLY SURGILUBE LUBE TUBE 2OZ

## (undated) DEVICE — Device

## (undated) DEVICE — GAUZE SPONGE BULKEE 6X6.75IN

## (undated) DEVICE — SLEEVE SCD EXPRESS KNEE MEDIUM

## (undated) DEVICE — DRESSING TRANS 2X2 TEGADERM

## (undated) DEVICE — SCRUB DYNA-HEX LIQ 4% CHG 4OZ

## (undated) DEVICE — SYR 10CC LUER LOCK

## (undated) DEVICE — SHEATH URET ACCESS 14FRX45CM

## (undated) DEVICE — GOWN POLY REINF BRTH SLV XL

## (undated) DEVICE — BASKET N-GAGE 2.2 FR/115CM

## (undated) DEVICE — GUIDEWIRE AMPLATZ .035X145 STR

## (undated) DEVICE — CATH URETH FOLEY 2W 16FR 5ML

## (undated) DEVICE — GUIDE WIRE MOTION .035 X 150CM

## (undated) DEVICE — SYR ONLY LUER LOCK 20CC

## (undated) DEVICE — WIRE GUIDE LUBRCTD ANGL 3CM

## (undated) DEVICE — COVER TABLE 44X90 STERILE

## (undated) DEVICE — BAG LINGEMAN DRAIN UROLOGY

## (undated) DEVICE — SOL WATER STRL IRR 1000ML

## (undated) DEVICE — SYR LUER LOCK STERILE 10ML

## (undated) DEVICE — ADHESIVE MASTISOL VIAL 48/BX

## (undated) DEVICE — SPONGE BULKEE II ABSRB 6X6.75

## (undated) DEVICE — SOL IRR NACL .9% 3000ML

## (undated) DEVICE — EXTRACTOR TIPLESS 3FR 115 CM

## (undated) DEVICE — EXTRACTOR TIPLESS 2.4FRX1115CM

## (undated) DEVICE — BAG URINARY DRN 2000ML

## (undated) DEVICE — CATH POLLACK OPEN-END FLEXI-TI

## (undated) DEVICE — URETEROSCOPE LITHOVUE STANDARD

## (undated) DEVICE — SOL 9P NACL IRR PIC IL